# Patient Record
Sex: MALE | Race: WHITE | NOT HISPANIC OR LATINO | Employment: FULL TIME | URBAN - METROPOLITAN AREA
[De-identification: names, ages, dates, MRNs, and addresses within clinical notes are randomized per-mention and may not be internally consistent; named-entity substitution may affect disease eponyms.]

---

## 2023-02-14 PROBLEM — E66.811 CLASS 1 OBESITY WITH BODY MASS INDEX (BMI) OF 30.0 TO 30.9 IN ADULT: Status: ACTIVE | Noted: 2023-02-14

## 2023-02-14 PROBLEM — I10 BENIGN HYPERTENSION: Status: ACTIVE | Noted: 2023-02-14

## 2023-02-14 PROBLEM — E78.2 MIXED HYPERLIPIDEMIA: Status: ACTIVE | Noted: 2023-02-14

## 2023-02-14 PROBLEM — R45.0 NERVOUSLY ANXIOUS: Status: ACTIVE | Noted: 2023-02-14

## 2023-02-14 PROBLEM — E66.9 CLASS 1 OBESITY WITH BODY MASS INDEX (BMI) OF 30.0 TO 30.9 IN ADULT: Status: ACTIVE | Noted: 2023-02-14

## 2023-02-14 PROBLEM — F10.10 ALCOHOL ABUSE, CONTINUOUS: Status: ACTIVE | Noted: 2023-02-14

## 2023-02-14 PROBLEM — E66.9 CLASS 1 OBESITY WITH BODY MASS INDEX (BMI) OF 31.0 TO 31.9 IN ADULT: Status: ACTIVE | Noted: 2023-02-14

## 2023-02-14 PROBLEM — E66.811 CLASS 1 OBESITY WITH BODY MASS INDEX (BMI) OF 31.0 TO 31.9 IN ADULT: Status: ACTIVE | Noted: 2023-02-14

## 2023-02-14 RX ORDER — ATORVASTATIN CALCIUM 10 MG/1
1 TABLET, FILM COATED ORAL DAILY
COMMUNITY
Start: 2021-12-08 | End: 2023-06-15 | Stop reason: SDUPTHER

## 2023-02-14 RX ORDER — AMLODIPINE BESYLATE 5 MG/1
1 TABLET ORAL DAILY
COMMUNITY
Start: 2022-11-16 | End: 2023-04-13

## 2023-02-14 RX ORDER — LISINOPRIL 40 MG/1
1 TABLET ORAL DAILY
COMMUNITY
Start: 2021-11-10 | End: 2023-05-29

## 2023-02-14 RX ORDER — METOPROLOL SUCCINATE 50 MG/1
1 TABLET, EXTENDED RELEASE ORAL DAILY
COMMUNITY
Start: 2022-02-16 | End: 2023-05-31

## 2023-04-13 DIAGNOSIS — I10 BENIGN HYPERTENSION: Primary | ICD-10-CM

## 2023-04-13 RX ORDER — AMLODIPINE BESYLATE 5 MG/1
TABLET ORAL
Qty: 30 TABLET | Refills: 0 | Status: SHIPPED | OUTPATIENT
Start: 2023-04-13 | End: 2023-04-18

## 2023-04-13 RX ORDER — HYDROCHLOROTHIAZIDE 25 MG/1
1 TABLET ORAL DAILY
COMMUNITY
Start: 2022-10-31 | End: 2023-06-15 | Stop reason: SDUPTHER

## 2023-04-15 DIAGNOSIS — I10 BENIGN HYPERTENSION: ICD-10-CM

## 2023-04-18 RX ORDER — AMLODIPINE BESYLATE 5 MG/1
TABLET ORAL
Qty: 30 TABLET | Refills: 0 | Status: SHIPPED | OUTPATIENT
Start: 2023-04-18 | End: 2023-06-15 | Stop reason: SDUPTHER

## 2023-04-20 ENCOUNTER — APPOINTMENT (OUTPATIENT)
Dept: PRIMARY CARE | Facility: CLINIC | Age: 53
End: 2023-04-20

## 2023-05-30 DIAGNOSIS — I10 BENIGN HYPERTENSION: Primary | ICD-10-CM

## 2023-05-31 RX ORDER — METOPROLOL SUCCINATE 50 MG/1
TABLET, EXTENDED RELEASE ORAL
Qty: 30 TABLET | Refills: 0 | Status: SHIPPED | OUTPATIENT
Start: 2023-05-31 | End: 2023-06-15 | Stop reason: SDUPTHER

## 2023-06-15 ENCOUNTER — OFFICE VISIT (OUTPATIENT)
Dept: PRIMARY CARE | Facility: CLINIC | Age: 53
End: 2023-06-15
Payer: COMMERCIAL

## 2023-06-15 VITALS
HEIGHT: 75 IN | RESPIRATION RATE: 18 BRPM | WEIGHT: 241 LBS | HEART RATE: 68 BPM | DIASTOLIC BLOOD PRESSURE: 85 MMHG | SYSTOLIC BLOOD PRESSURE: 130 MMHG | BODY MASS INDEX: 29.97 KG/M2 | OXYGEN SATURATION: 98 %

## 2023-06-15 DIAGNOSIS — I10 BENIGN HYPERTENSION: Primary | ICD-10-CM

## 2023-06-15 DIAGNOSIS — E78.2 MIXED HYPERLIPIDEMIA: ICD-10-CM

## 2023-06-15 DIAGNOSIS — R73.03 PREDIABETES: ICD-10-CM

## 2023-06-15 DIAGNOSIS — E66.09 CLASS 1 OBESITY DUE TO EXCESS CALORIES WITHOUT SERIOUS COMORBIDITY WITH BODY MASS INDEX (BMI) OF 30.0 TO 30.9 IN ADULT: ICD-10-CM

## 2023-06-15 PROCEDURE — 3008F BODY MASS INDEX DOCD: CPT | Performed by: STUDENT IN AN ORGANIZED HEALTH CARE EDUCATION/TRAINING PROGRAM

## 2023-06-15 PROCEDURE — 3079F DIAST BP 80-89 MM HG: CPT | Performed by: STUDENT IN AN ORGANIZED HEALTH CARE EDUCATION/TRAINING PROGRAM

## 2023-06-15 PROCEDURE — 99214 OFFICE O/P EST MOD 30 MIN: CPT | Performed by: STUDENT IN AN ORGANIZED HEALTH CARE EDUCATION/TRAINING PROGRAM

## 2023-06-15 PROCEDURE — 3075F SYST BP GE 130 - 139MM HG: CPT | Performed by: STUDENT IN AN ORGANIZED HEALTH CARE EDUCATION/TRAINING PROGRAM

## 2023-06-15 RX ORDER — METOPROLOL SUCCINATE 50 MG/1
50 TABLET, EXTENDED RELEASE ORAL DAILY
Qty: 30 TABLET | Refills: 5 | Status: SHIPPED | OUTPATIENT
Start: 2023-06-15 | End: 2023-12-26

## 2023-06-15 RX ORDER — HYDROCHLOROTHIAZIDE 25 MG/1
25 TABLET ORAL DAILY
Qty: 30 TABLET | Refills: 5 | Status: SHIPPED | OUTPATIENT
Start: 2023-06-15 | End: 2023-06-15 | Stop reason: SINTOL

## 2023-06-15 RX ORDER — ATORVASTATIN CALCIUM 10 MG/1
10 TABLET, FILM COATED ORAL DAILY
Qty: 90 TABLET | Refills: 1 | Status: SHIPPED | OUTPATIENT
Start: 2023-06-15 | End: 2024-02-14 | Stop reason: SDUPTHER

## 2023-06-15 RX ORDER — AMLODIPINE BESYLATE 5 MG/1
5 TABLET ORAL DAILY
Qty: 30 TABLET | Refills: 5 | Status: SHIPPED | OUTPATIENT
Start: 2023-06-15 | End: 2023-12-23

## 2023-06-15 RX ORDER — LISINOPRIL 40 MG/1
40 TABLET ORAL DAILY
Qty: 30 TABLET | Refills: 5 | Status: SHIPPED | OUTPATIENT
Start: 2023-06-15 | End: 2023-12-26

## 2023-06-15 SDOH — ECONOMIC STABILITY: FOOD INSECURITY: WITHIN THE PAST 12 MONTHS, THE FOOD YOU BOUGHT JUST DIDN'T LAST AND YOU DIDN'T HAVE MONEY TO GET MORE.: NEVER TRUE

## 2023-06-15 SDOH — ECONOMIC STABILITY: FOOD INSECURITY: WITHIN THE PAST 12 MONTHS, YOU WORRIED THAT YOUR FOOD WOULD RUN OUT BEFORE YOU GOT MONEY TO BUY MORE.: NEVER TRUE

## 2023-06-15 ASSESSMENT — ENCOUNTER SYMPTOMS
MUSCULOSKELETAL NEGATIVE: 1
HEADACHES: 0
NAUSEA: 0
DIARRHEA: 0
CONFUSION: 0
CHILLS: 0
FATIGUE: 0
UNEXPECTED WEIGHT CHANGE: 0
ABDOMINAL PAIN: 0
CONSTIPATION: 0
VOMITING: 0
WHEEZING: 0
SHORTNESS OF BREATH: 0
PALPITATIONS: 0
COLOR CHANGE: 0
COUGH: 0
DIZZINESS: 0
FEVER: 0

## 2023-06-15 ASSESSMENT — LIFESTYLE VARIABLES
HOW MANY STANDARD DRINKS CONTAINING ALCOHOL DO YOU HAVE ON A TYPICAL DAY: 10 OR MORE
HOW OFTEN DO YOU HAVE A DRINK CONTAINING ALCOHOL: 4 OR MORE TIMES A WEEK
AUDIT-C TOTAL SCORE: 12
HOW OFTEN DO YOU HAVE SIX OR MORE DRINKS ON ONE OCCASION: DAILY OR ALMOST DAILY
SKIP TO QUESTIONS 9-10: 0

## 2023-06-15 ASSESSMENT — PATIENT HEALTH QUESTIONNAIRE - PHQ9
2. FEELING DOWN, DEPRESSED OR HOPELESS: NOT AT ALL
SUM OF ALL RESPONSES TO PHQ9 QUESTIONS 1 & 2: 0
1. LITTLE INTEREST OR PLEASURE IN DOING THINGS: NOT AT ALL

## 2023-06-15 NOTE — PROGRESS NOTES
"Subjective   Patient ID: Selvin Ochoa is a 53 y.o. male who presents for Follow-up.    HPI   He is here for FU visit. Reports he is doing okay, no acute issues.     # HTN/HLD # Obesity   - io BP was 130/85; not checking BP lately   - taking amlodipine 5mg, lisinopril 40 mg & Metop XL 50 mg daily   - takes atorva 10 mg daily w/o issues   - current BMI, 30.     Review of Systems   Constitutional:  Negative for chills, fatigue, fever and unexpected weight change.   HENT: Negative.     Respiratory:  Negative for cough, shortness of breath and wheezing.    Cardiovascular:  Negative for chest pain, palpitations and leg swelling.   Gastrointestinal:  Negative for abdominal pain, constipation, diarrhea, nausea and vomiting.   Musculoskeletal: Negative.    Skin:  Negative for color change and rash.   Neurological:  Negative for dizziness and headaches.   Psychiatric/Behavioral:  Negative for behavioral problems and confusion.        Objective   /85   Pulse 68   Resp 18   Ht 1.892 m (6' 2.5\")   Wt 109 kg (241 lb)   SpO2 98%   BMI 30.53 kg/m²     Physical Exam  Vitals and nursing note reviewed.   Constitutional:       Appearance: Normal appearance. He is obese.   Cardiovascular:      Rate and Rhythm: Normal rate and regular rhythm.      Pulses: Normal pulses.      Heart sounds: Normal heart sounds.   Pulmonary:      Effort: Pulmonary effort is normal. No respiratory distress.      Breath sounds: Normal breath sounds.   Abdominal:      General: Abdomen is flat. Bowel sounds are normal.      Palpations: Abdomen is soft.   Musculoskeletal:         General: Normal range of motion.   Neurological:      General: No focal deficit present.      Mental Status: He is alert and oriented to person, place, and time.   Psychiatric:         Mood and Affect: Mood normal.         Behavior: Behavior normal.       Assessment/Plan   He is here for FU visit. Overall doing okay, no major concerns today and is clinically & vitally " stable. Plan as follows     # HTN/HLD # Obesity   - BP at the goal, continue current regimen  - meds refilled per emr, may optimize as needed at future visits   - cont statin per emr, tolerating well   - encourage heart healthy & DASH diet; continue exercise as tolerated, at least 150 mins per wk   - BW per emr, will call for any abn results as indicated; pt made aware     # Prediabetes   - cont to follow low carb diet; daily exercises   - repeat A1c as below     Problem List Items Addressed This Visit          Circulatory    Benign hypertension - Primary    Relevant Medications    amLODIPine (Norvasc) 5 mg tablet    lisinopril 40 mg tablet    metoprolol succinate XL (Toprol-XL) 50 mg 24 hr tablet    Other Relevant Orders    Comprehensive Metabolic Panel    CBC       Endocrine/Metabolic    Class 1 obesity with body mass index (BMI) of 30.0 to 30.9 in adult       Other    Mixed hyperlipidemia    Relevant Medications    atorvastatin (Lipitor) 10 mg tablet    Other Relevant Orders    Lipid Panel    CBC     Other Visit Diagnoses       Prediabetes        Relevant Orders    Hemoglobin A1c    CBC          Rtc 6 mo fu    Pankaj Rios MD    [facility]      60

## 2023-12-18 ENCOUNTER — APPOINTMENT (OUTPATIENT)
Dept: PRIMARY CARE | Facility: CLINIC | Age: 53
End: 2023-12-18
Payer: MEDICAID

## 2023-12-21 DIAGNOSIS — I10 BENIGN HYPERTENSION: ICD-10-CM

## 2023-12-23 RX ORDER — AMLODIPINE BESYLATE 5 MG/1
5 TABLET ORAL DAILY
Qty: 30 TABLET | Refills: 1 | Status: SHIPPED | OUTPATIENT
Start: 2023-12-23 | End: 2024-02-14 | Stop reason: SDUPTHER

## 2023-12-26 ENCOUNTER — TELEPHONE (OUTPATIENT)
Dept: PRIMARY CARE | Facility: CLINIC | Age: 53
End: 2023-12-26
Payer: MEDICAID

## 2023-12-26 DIAGNOSIS — I10 BENIGN HYPERTENSION: ICD-10-CM

## 2023-12-26 RX ORDER — METOPROLOL SUCCINATE 50 MG/1
TABLET, EXTENDED RELEASE ORAL
Qty: 30 TABLET | Refills: 1 | Status: SHIPPED | OUTPATIENT
Start: 2023-12-26 | End: 2024-02-14 | Stop reason: SDUPTHER

## 2023-12-26 RX ORDER — LISINOPRIL 40 MG/1
40 TABLET ORAL DAILY
Qty: 30 TABLET | Refills: 1 | Status: SHIPPED | OUTPATIENT
Start: 2023-12-26 | End: 2024-02-14 | Stop reason: SDUPTHER

## 2023-12-26 NOTE — TELEPHONE ENCOUNTER
Pt called in and stated he has an appt with you in February. He had an appt on 12/18, but it was cancelled by us, not him. And February was first available. Do you want me to double book him somewhere? Or bring him in tomorrow?

## 2024-01-09 ENCOUNTER — HOSPITAL ENCOUNTER (INPATIENT)
Facility: HOSPITAL | Age: 54
LOS: 13 days | Discharge: NURSING, REHAB OR LONG-TERM CARE | End: 2024-01-23
Attending: EMERGENCY MEDICINE | Admitting: INTERNAL MEDICINE
Payer: MEDICAID

## 2024-01-09 ENCOUNTER — APPOINTMENT (OUTPATIENT)
Dept: RADIOLOGY | Facility: HOSPITAL | Age: 54
End: 2024-01-09
Payer: MEDICAID

## 2024-01-09 ENCOUNTER — APPOINTMENT (OUTPATIENT)
Dept: CARDIOLOGY | Facility: HOSPITAL | Age: 54
End: 2024-01-09
Payer: MEDICAID

## 2024-01-09 DIAGNOSIS — E87.1 HYPONATREMIA: Primary | ICD-10-CM

## 2024-01-09 DIAGNOSIS — R13.12 OROPHARYNGEAL DYSPHAGIA: ICD-10-CM

## 2024-01-09 LAB
ALBUMIN SERPL BCP-MCNC: 2.8 G/DL (ref 3.4–5)
ALP SERPL-CCNC: 227 U/L (ref 33–120)
ALT SERPL W P-5'-P-CCNC: 193 U/L (ref 10–52)
ANION GAP SERPL CALC-SCNC: 15 MMOL/L (ref 10–20)
APPEARANCE UR: CLEAR
AST SERPL W P-5'-P-CCNC: 399 U/L (ref 9–39)
BASOPHILS # BLD MANUAL: 0 X10*3/UL (ref 0–0.1)
BASOPHILS NFR BLD MANUAL: 0 %
BILIRUB SERPL-MCNC: 14.3 MG/DL (ref 0–1.2)
BILIRUB UR STRIP.AUTO-MCNC: ABNORMAL MG/DL
BUN SERPL-MCNC: 11 MG/DL (ref 6–23)
CALCIUM SERPL-MCNC: 8 MG/DL (ref 8.6–10.3)
CHLORIDE SERPL-SCNC: 80 MMOL/L (ref 98–107)
CO2 SERPL-SCNC: 23 MMOL/L (ref 21–32)
COLOR UR: ABNORMAL
CREAT SERPL-MCNC: 1.31 MG/DL (ref 0.5–1.3)
EGFRCR SERPLBLD CKD-EPI 2021: 65 ML/MIN/1.73M*2
EOSINOPHIL # BLD MANUAL: 0 X10*3/UL (ref 0–0.7)
EOSINOPHIL NFR BLD MANUAL: 0 %
ERYTHROCYTE [DISTWIDTH] IN BLOOD BY AUTOMATED COUNT: 15 % (ref 11.5–14.5)
GLUCOSE SERPL-MCNC: 105 MG/DL (ref 74–99)
GLUCOSE UR STRIP.AUTO-MCNC: NEGATIVE MG/DL
HCT VFR BLD AUTO: 33.3 % (ref 41–52)
HGB BLD-MCNC: 12.5 G/DL (ref 13.5–17.5)
HOLD SPECIMEN: NORMAL
HOLD SPECIMEN: NORMAL
HYALINE CASTS #/AREA URNS AUTO: ABNORMAL /LPF
HYPOCHROMIA BLD QL SMEAR: ABNORMAL
IMM GRANULOCYTES # BLD AUTO: 0.06 X10*3/UL (ref 0–0.7)
IMM GRANULOCYTES NFR BLD AUTO: 0.6 % (ref 0–0.9)
INR PPP: 1.2 (ref 0.9–1.1)
KETONES UR STRIP.AUTO-MCNC: ABNORMAL MG/DL
LACTATE SERPL-SCNC: 1.5 MMOL/L (ref 0.4–2)
LEUKOCYTE ESTERASE UR QL STRIP.AUTO: NEGATIVE
LIPASE SERPL-CCNC: 24 U/L (ref 9–82)
LYMPHOCYTES # BLD MANUAL: 0.32 X10*3/UL (ref 1.2–4.8)
LYMPHOCYTES NFR BLD MANUAL: 3 %
MCH RBC QN AUTO: 33.8 PG (ref 26–34)
MCHC RBC AUTO-ENTMCNC: 37.5 G/DL (ref 32–36)
MCV RBC AUTO: 90 FL (ref 80–100)
MONOCYTES # BLD MANUAL: 0.86 X10*3/UL (ref 0.1–1)
MONOCYTES NFR BLD MANUAL: 8 %
MUCOUS THREADS #/AREA URNS AUTO: ABNORMAL /LPF
NEUTROPHILS # BLD MANUAL: 9.61 X10*3/UL (ref 1.2–7.7)
NEUTS BAND # BLD MANUAL: 0.11 X10*3/UL (ref 0–0.7)
NEUTS BAND NFR BLD MANUAL: 1 %
NEUTS SEG # BLD MANUAL: 9.5 X10*3/UL (ref 1.2–7)
NEUTS SEG NFR BLD MANUAL: 88 %
NITRITE UR QL STRIP.AUTO: NEGATIVE
NRBC BLD-RTO: 0.3 /100 WBCS (ref 0–0)
PH UR STRIP.AUTO: 6 [PH]
PHOSPHATE SERPL-MCNC: 2.3 MG/DL (ref 2.5–4.9)
PLATELET # BLD AUTO: 184 X10*3/UL (ref 150–450)
POTASSIUM SERPL-SCNC: 4.7 MMOL/L (ref 3.5–5.3)
PROT SERPL-MCNC: 5.5 G/DL (ref 6.4–8.2)
PROT UR STRIP.AUTO-MCNC: ABNORMAL MG/DL
PROTHROMBIN TIME: 13.4 SECONDS (ref 9.8–12.8)
RBC # BLD AUTO: 3.7 X10*6/UL (ref 4.5–5.9)
RBC # UR STRIP.AUTO: NEGATIVE /UL
RBC #/AREA URNS AUTO: ABNORMAL /HPF
RBC MORPH BLD: ABNORMAL
SODIUM SERPL-SCNC: 113 MMOL/L (ref 136–145)
SODIUM SERPL-SCNC: 113 MMOL/L (ref 136–145)
SODIUM SERPL-SCNC: 116 MMOL/L (ref 136–145)
SP GR UR STRIP.AUTO: 1.06
TARGETS BLD QL SMEAR: ABNORMAL
TOTAL CELLS COUNTED BLD: 100
UROBILINOGEN UR STRIP.AUTO-MCNC: 4 MG/DL
WBC # BLD AUTO: 10.8 X10*3/UL (ref 4.4–11.3)
WBC #/AREA URNS AUTO: ABNORMAL /HPF

## 2024-01-09 PROCEDURE — C9113 INJ PANTOPRAZOLE SODIUM, VIA: HCPCS | Performed by: NURSE PRACTITIONER

## 2024-01-09 PROCEDURE — 2550000001 HC RX 255 CONTRASTS: Performed by: EMERGENCY MEDICINE

## 2024-01-09 PROCEDURE — 2500000001 HC RX 250 WO HCPCS SELF ADMINISTERED DRUGS (ALT 637 FOR MEDICARE OP): Performed by: NURSE PRACTITIONER

## 2024-01-09 PROCEDURE — 93005 ELECTROCARDIOGRAM TRACING: CPT

## 2024-01-09 PROCEDURE — 2500000004 HC RX 250 GENERAL PHARMACY W/ HCPCS (ALT 636 FOR OP/ED): Performed by: NURSE PRACTITIONER

## 2024-01-09 PROCEDURE — 82570 ASSAY OF URINE CREATININE: CPT | Performed by: INTERNAL MEDICINE

## 2024-01-09 PROCEDURE — 36415 COLL VENOUS BLD VENIPUNCTURE: CPT | Performed by: INTERNAL MEDICINE

## 2024-01-09 PROCEDURE — 85027 COMPLETE CBC AUTOMATED: CPT | Performed by: NURSE PRACTITIONER

## 2024-01-09 PROCEDURE — 80074 ACUTE HEPATITIS PANEL: CPT | Mod: PORLAB | Performed by: INTERNAL MEDICINE

## 2024-01-09 PROCEDURE — 99291 CRITICAL CARE FIRST HOUR: CPT | Performed by: EMERGENCY MEDICINE

## 2024-01-09 PROCEDURE — 74177 CT ABD & PELVIS W/CONTRAST: CPT | Mod: FOREIGN READ | Performed by: RADIOLOGY

## 2024-01-09 PROCEDURE — 83930 ASSAY OF BLOOD OSMOLALITY: CPT | Mod: PORLAB | Performed by: INTERNAL MEDICINE

## 2024-01-09 PROCEDURE — 36415 COLL VENOUS BLD VENIPUNCTURE: CPT | Performed by: NURSE PRACTITIONER

## 2024-01-09 PROCEDURE — 81001 URINALYSIS AUTO W/SCOPE: CPT | Performed by: NURSE PRACTITIONER

## 2024-01-09 PROCEDURE — 83605 ASSAY OF LACTIC ACID: CPT | Performed by: NURSE PRACTITIONER

## 2024-01-09 PROCEDURE — 2500000004 HC RX 250 GENERAL PHARMACY W/ HCPCS (ALT 636 FOR OP/ED): Performed by: EMERGENCY MEDICINE

## 2024-01-09 PROCEDURE — 74177 CT ABD & PELVIS W/CONTRAST: CPT | Mod: FR

## 2024-01-09 PROCEDURE — 85610 PROTHROMBIN TIME: CPT | Performed by: NURSE PRACTITIONER

## 2024-01-09 PROCEDURE — 84295 ASSAY OF SERUM SODIUM: CPT | Performed by: INTERNAL MEDICINE

## 2024-01-09 PROCEDURE — 85007 BL SMEAR W/DIFF WBC COUNT: CPT | Performed by: NURSE PRACTITIONER

## 2024-01-09 PROCEDURE — 99223 1ST HOSP IP/OBS HIGH 75: CPT | Performed by: INTERNAL MEDICINE

## 2024-01-09 PROCEDURE — 84100 ASSAY OF PHOSPHORUS: CPT | Performed by: INTERNAL MEDICINE

## 2024-01-09 PROCEDURE — 80053 COMPREHEN METABOLIC PANEL: CPT | Performed by: NURSE PRACTITIONER

## 2024-01-09 PROCEDURE — 83935 ASSAY OF URINE OSMOLALITY: CPT | Mod: PORLAB | Performed by: INTERNAL MEDICINE

## 2024-01-09 PROCEDURE — 83690 ASSAY OF LIPASE: CPT | Performed by: NURSE PRACTITIONER

## 2024-01-09 PROCEDURE — 2500000004 HC RX 250 GENERAL PHARMACY W/ HCPCS (ALT 636 FOR OP/ED): Performed by: INTERNAL MEDICINE

## 2024-01-09 RX ORDER — FOLIC ACID 1 MG/1
1 TABLET ORAL DAILY
Status: DISCONTINUED | OUTPATIENT
Start: 2024-01-09 | End: 2024-01-09

## 2024-01-09 RX ORDER — PANTOPRAZOLE SODIUM 40 MG/1
40 TABLET, DELAYED RELEASE ORAL
Status: DISCONTINUED | OUTPATIENT
Start: 2024-01-10 | End: 2024-01-12

## 2024-01-09 RX ORDER — LANOLIN ALCOHOL/MO/W.PET/CERES
100 CREAM (GRAM) TOPICAL DAILY
Status: DISCONTINUED | OUTPATIENT
Start: 2024-01-12 | End: 2024-01-10

## 2024-01-09 RX ORDER — SODIUM CHLORIDE 9 MG/ML
125 INJECTION, SOLUTION INTRAVENOUS CONTINUOUS
Status: DISCONTINUED | OUTPATIENT
Start: 2024-01-09 | End: 2024-01-09

## 2024-01-09 RX ORDER — MULTIVIT-MIN/IRON FUM/FOLIC AC 7.5 MG-4
1 TABLET ORAL DAILY
Status: DISCONTINUED | OUTPATIENT
Start: 2024-01-09 | End: 2024-01-09

## 2024-01-09 RX ORDER — PANTOPRAZOLE SODIUM 40 MG/10ML
40 INJECTION, POWDER, LYOPHILIZED, FOR SOLUTION INTRAVENOUS ONCE
Status: DISCONTINUED | OUTPATIENT
Start: 2024-01-09 | End: 2024-01-10 | Stop reason: SDUPTHER

## 2024-01-09 RX ORDER — LANOLIN ALCOHOL/MO/W.PET/CERES
100 CREAM (GRAM) TOPICAL DAILY
Status: DISCONTINUED | OUTPATIENT
Start: 2024-01-13 | End: 2024-01-09 | Stop reason: ALTCHOICE

## 2024-01-09 RX ORDER — DEXTROSE MONOHYDRATE AND SODIUM CHLORIDE 5; .45 G/100ML; G/100ML
75 INJECTION, SOLUTION INTRAVENOUS CONTINUOUS
Status: DISCONTINUED | OUTPATIENT
Start: 2024-01-09 | End: 2024-01-10

## 2024-01-09 RX ORDER — LORAZEPAM 2 MG/ML
2 INJECTION INTRAMUSCULAR EVERY 2 HOUR PRN
Status: DISCONTINUED | OUTPATIENT
Start: 2024-01-09 | End: 2024-01-23 | Stop reason: HOSPADM

## 2024-01-09 RX ORDER — FOLIC ACID 1 MG/1
1 TABLET ORAL DAILY
Status: DISCONTINUED | OUTPATIENT
Start: 2024-01-10 | End: 2024-01-23 | Stop reason: HOSPADM

## 2024-01-09 RX ORDER — FAMOTIDINE 10 MG/ML
40 INJECTION INTRAVENOUS ONCE
Status: COMPLETED | OUTPATIENT
Start: 2024-01-09 | End: 2024-01-09

## 2024-01-09 RX ORDER — PANTOPRAZOLE SODIUM 40 MG/10ML
40 INJECTION, POWDER, LYOPHILIZED, FOR SOLUTION INTRAVENOUS ONCE
Status: COMPLETED | OUTPATIENT
Start: 2024-01-09 | End: 2024-01-09

## 2024-01-09 RX ORDER — LORAZEPAM 2 MG/ML
0.5 INJECTION INTRAMUSCULAR EVERY 2 HOUR PRN
Status: DISCONTINUED | OUTPATIENT
Start: 2024-01-09 | End: 2024-01-23 | Stop reason: HOSPADM

## 2024-01-09 RX ORDER — MULTIVIT-MIN/IRON FUM/FOLIC AC 7.5 MG-4
1 TABLET ORAL DAILY
Status: DISCONTINUED | OUTPATIENT
Start: 2024-01-10 | End: 2024-01-23 | Stop reason: HOSPADM

## 2024-01-09 RX ORDER — LANOLIN ALCOHOL/MO/W.PET/CERES
100 CREAM (GRAM) TOPICAL DAILY
Status: DISCONTINUED | OUTPATIENT
Start: 2024-01-09 | End: 2024-01-09

## 2024-01-09 RX ORDER — PHENOBARBITAL SODIUM 130 MG/ML
130 INJECTION, SOLUTION INTRAMUSCULAR; INTRAVENOUS
Status: DISCONTINUED | OUTPATIENT
Start: 2024-01-09 | End: 2024-01-09

## 2024-01-09 RX ORDER — ONDANSETRON HYDROCHLORIDE 2 MG/ML
4 INJECTION, SOLUTION INTRAVENOUS EVERY 4 HOURS PRN
Status: DISCONTINUED | OUTPATIENT
Start: 2024-01-09 | End: 2024-01-23 | Stop reason: HOSPADM

## 2024-01-09 RX ORDER — ONDANSETRON HYDROCHLORIDE 2 MG/ML
4 INJECTION, SOLUTION INTRAVENOUS ONCE
Status: COMPLETED | OUTPATIENT
Start: 2024-01-09 | End: 2024-01-09

## 2024-01-09 RX ORDER — LORAZEPAM 2 MG/ML
1 INJECTION INTRAMUSCULAR EVERY 2 HOUR PRN
Status: DISCONTINUED | OUTPATIENT
Start: 2024-01-09 | End: 2024-01-23 | Stop reason: HOSPADM

## 2024-01-09 RX ORDER — THIAMINE HYDROCHLORIDE 100 MG/ML
100 INJECTION, SOLUTION INTRAMUSCULAR; INTRAVENOUS DAILY
Status: DISCONTINUED | OUTPATIENT
Start: 2024-01-09 | End: 2024-01-10

## 2024-01-09 RX ADMIN — Medication 1 TABLET: at 13:46

## 2024-01-09 RX ADMIN — FOLIC ACID 1 MG: 1 TABLET ORAL at 13:46

## 2024-01-09 RX ADMIN — ONDANSETRON 4 MG: 2 INJECTION INTRAMUSCULAR; INTRAVENOUS at 13:46

## 2024-01-09 RX ADMIN — PHENOBARBITAL SODIUM 130 MG: 130 INJECTION INTRAMUSCULAR; INTRAVENOUS at 13:46

## 2024-01-09 RX ADMIN — Medication 100 MG: at 13:46

## 2024-01-09 RX ADMIN — SODIUM CHLORIDE 1000 ML: 9 INJECTION, SOLUTION INTRAVENOUS at 13:46

## 2024-01-09 RX ADMIN — SODIUM CHLORIDE 125 ML/HR: 9 INJECTION, SOLUTION INTRAVENOUS at 15:42

## 2024-01-09 RX ADMIN — IOHEXOL 100 ML: 350 INJECTION, SOLUTION INTRAVENOUS at 16:36

## 2024-01-09 RX ADMIN — FAMOTIDINE 40 MG: 10 INJECTION, SOLUTION INTRAVENOUS at 15:42

## 2024-01-09 RX ADMIN — DEXTROSE AND SODIUM CHLORIDE 75 ML/HR: 5; 450 INJECTION, SOLUTION INTRAVENOUS at 18:37

## 2024-01-09 RX ADMIN — LORAZEPAM 1 MG: 2 INJECTION, SOLUTION INTRAMUSCULAR; INTRAVENOUS at 21:24

## 2024-01-09 RX ADMIN — PANTOPRAZOLE SODIUM 40 MG: 40 INJECTION, POWDER, FOR SOLUTION INTRAVENOUS at 13:46

## 2024-01-09 ASSESSMENT — LIFESTYLE VARIABLES
TREMOR: 2
BLOOD PRESSURE: 110/72
NAUSEA AND VOMITING: NO NAUSEA AND NO VOMITING
HEADACHE, FULLNESS IN HEAD: NOT PRESENT
HAVE PEOPLE ANNOYED YOU BY CRITICIZING YOUR DRINKING: NO
ORIENTATION AND CLOUDING OF SENSORIUM: ORIENTED AND CAN DO SERIAL ADDITIONS
VISUAL DISTURBANCES: NOT PRESENT
EVER HAD A DRINK FIRST THING IN THE MORNING TO STEADY YOUR NERVES TO GET RID OF A HANGOVER: YES
ORIENTATION AND CLOUDING OF SENSORIUM: DISORIENTED FOR PLACE OR PERSON
PAROXYSMAL SWEATS: NO SWEAT VISIBLE
ANXIETY: NO ANXIETY, AT EASE
PULSE: 65
AUDITORY DISTURBANCES: NOT PRESENT
HAVE YOU EVER FELT YOU SHOULD CUT DOWN ON YOUR DRINKING: YES
PAROXYSMAL SWEATS: NO SWEAT VISIBLE
BLOOD PRESSURE: 116/72
TOTAL SCORE: 4
TOTAL SCORE: 8
NAUSEA AND VOMITING: NO NAUSEA AND NO VOMITING
NAUSEA AND VOMITING: NO NAUSEA AND NO VOMITING
PULSE: 75
ORIENTATION AND CLOUDING OF SENSORIUM: ORIENTED AND CAN DO SERIAL ADDITIONS
EVER FELT BAD OR GUILTY ABOUT YOUR DRINKING: YES
PAROXYSMAL SWEATS: 2
ANXIETY: NO ANXIETY, AT EASE
AGITATION: NORMAL ACTIVITY
AUDITORY DISTURBANCES: NOT PRESENT
TREMOR: 2
AUDITORY DISTURBANCES: NOT PRESENT
TOTAL SCORE: 14
VISUAL DISTURBANCES: MILD SENSITIVITY
HEADACHE, FULLNESS IN HEAD: NOT PRESENT
AUDITORY DISTURBANCES: VERY MILD HARSHNESS OR ABILITY TO FRIGHTEN
TREMOR: 3
AGITATION: NORMAL ACTIVITY
TACTILE DISTURBANCES: VERY MILD ITCHING, PINS AND NEEDLES, BURNING OR NUMBNESS
HEADACHE, FULLNESS IN HEAD: NOT PRESENT
TREMOR: MODERATE, WITH PATIENT'S ARMS EXTENDED
HEADACHE, FULLNESS IN HEAD: VERY MILD
ANXIETY: NO ANXIETY, AT EASE
AGITATION: NORMAL ACTIVITY
ORIENTATION AND CLOUDING OF SENSORIUM: ORIENTED AND CAN DO SERIAL ADDITIONS
REASON UNABLE TO ASSESS: NO
ANXIETY: NO ANXIETY, AT EASE
NAUSEA AND VOMITING: 5
VISUAL DISTURBANCES: MILD SENSITIVITY
VISUAL DISTURBANCES: NOT PRESENT
AGITATION: NORMAL ACTIVITY
PAROXYSMAL SWEATS: BARELY PERCEPTIBLE SWEATING, PALMS MOIST
TOTAL SCORE: 4

## 2024-01-09 ASSESSMENT — COLUMBIA-SUICIDE SEVERITY RATING SCALE - C-SSRS
6. HAVE YOU EVER DONE ANYTHING, STARTED TO DO ANYTHING, OR PREPARED TO DO ANYTHING TO END YOUR LIFE?: NO
1. IN THE PAST MONTH, HAVE YOU WISHED YOU WERE DEAD OR WISHED YOU COULD GO TO SLEEP AND NOT WAKE UP?: NO
2. HAVE YOU ACTUALLY HAD ANY THOUGHTS OF KILLING YOURSELF?: NO

## 2024-01-09 ASSESSMENT — PAIN - FUNCTIONAL ASSESSMENT: PAIN_FUNCTIONAL_ASSESSMENT: 0-10

## 2024-01-09 NOTE — ED PROVIDER NOTES
HPI   Chief Complaint   Patient presents with   • Delirium Tremens (DTS)       This is a 53-year-old  male, with a past medical history of hypertension, hyperlipidemia, and prediabetes, and alcohol abuse, that is presenting to the emergency room with complaints of weakness for the past 3 days.  The patient reports that he has not been able to keep any solid food down the past 3 days as well.  He has been able to maintain fluids.  The patient drinks 8-10 tall boys per day.  He states that he has cut down.  The patient has had seizure activity when he has tried to quit drinking.  The patient denies any fevers, chills, night cough, or diarrhea.  He reports normal urination.  He noted yesterday that his eyes and chest were turning yellow.  The patient denies any diagnosis of cirrhosis or liver dysfunction.  The patient reports that his last drink was last night but then changed and stated that his last drink was this morning.  He denies any contacts.  He is not having any chest pain, shortness of breath, dizziness, palpitations, paresthesias, focal weakness.  Denies any abdominal pain.                            Noah Coma Scale Score: 15                  Patient History   Past Medical History:   Diagnosis Date   • Personal history of other diseases of the circulatory system     History of hypertension     Past Surgical History:   Procedure Laterality Date   • OTHER SURGICAL HISTORY  11/10/2021    Appendectomy     Family History   Problem Relation Name Age of Onset   • Heart failure Father       Social History     Tobacco Use   • Smoking status: Former     Types: Cigarettes   • Smokeless tobacco: Current     Types: Chew   Vaping Use   • Vaping Use: Never used   Substance Use Topics   • Alcohol use: Yes   • Drug use: Never       Physical Exam   ED Triage Vitals [01/09/24 1302]   Temp Heart Rate Resp BP   36.5 °C (97.7 °F) 85 16 116/70      SpO2 Temp src Heart Rate Source Patient Position   97 % -- -- --       BP Location FiO2 (%)     -- --       Physical Exam  Vitals and nursing note reviewed.   HENT:      Head: Normocephalic and atraumatic.      Right Ear: Tympanic membrane and external ear normal.      Left Ear: Tympanic membrane and external ear normal.      Nose: Nose normal.      Mouth/Throat:      Pharynx: Oropharynx is clear.   Eyes:      Conjunctiva/sclera: Conjunctivae normal.      Pupils: Pupils are equal, round, and reactive to light.   Cardiovascular:      Rate and Rhythm: Normal rate and regular rhythm.      Pulses: Normal pulses.      Heart sounds: Normal heart sounds.   Pulmonary:      Effort: Pulmonary effort is normal.      Breath sounds: Normal breath sounds.   Abdominal:      General: Bowel sounds are normal. There is distension.      Palpations: Abdomen is rigid. There is no fluid wave or pulsatile mass.      Tenderness: There is no abdominal tenderness. There is no right CVA tenderness, left CVA tenderness, guarding or rebound. Negative signs include Bergman's sign, Rovsing's sign and McBurney's sign.      Hernia: No hernia is present.   Musculoskeletal:         General: Normal range of motion.      Cervical back: Normal range of motion and neck supple.   Skin:     Capillary Refill: Capillary refill takes less than 2 seconds.      Coloration: Skin is jaundiced.   Neurological:      General: No focal deficit present.      Mental Status: He is alert.      Cranial Nerves: Cranial nerves 2-12 are intact.      Sensory: Sensation is intact.      Motor: Tremor present.      Coordination: Coordination is intact.   Psychiatric:         Mood and Affect: Mood normal.         ED Course & MDM   Diagnoses as of 01/14/24 0139   Hyponatremia       Medical Decision Making  The patient was seen and evaluated by the nurse practitioner, Mariaelena Chaudhari, in triage.  Preliminary orders were placed based on the patient's presentation.  Further evaluation will be provided by oncoming provider.  Orders are subject to change  based on provider's evaluation.      Procedure  Procedures     CATHLEEN Patrick-CNP  01/14/24 0139

## 2024-01-09 NOTE — H&P
History Of Present Illness  Selvin Ochoa is a 53 y.o. male with a PMH of HTN, HLD and EtOH use disorder presenting with weakness.     He reports progressive weakness for the slat three days. The patient reports that he has not been able to keep any solid food down the past 3 days as well.  He has been able to maintain fluids.  The patient drinks 8-10 tall boys per day.  He states that he has cut down.  The patient has had seizure activity when he has tried to quit drinking.  The patient denies any fevers, chills, night cough, or diarrhea.  He reports normal urination.  He noted yesterday that his eyes and chest were turning yellow.  The patient denies any diagnosis of cirrhosis or liver dysfunction.  The patient reports that his last drink was last night but then changed and stated that his last drink was this morning.  He denies any contacts.  He is not having any chest pain, shortness of breath, dizziness, palpitations, paresthesias, focal weakness.  Denies any abdominal pain.      Past Medical History  He has a past medical history of Personal history of other diseases of the circulatory system.    Surgical History  He has a past surgical history that includes Other surgical history (11/10/2021).     Social History  He reports that he has quit smoking. His smoking use included cigarettes. His smokeless tobacco use includes chew. He reports current alcohol use. He reports that he does not use drugs.    Family History  Family History   Problem Relation Name Age of Onset    Heart failure Father          Allergies  Patient has no known allergies.    Code Status  No Order     A Comprehensive greater than 10 system review of systems was carried out.  Pertinent positives and negatives are noted above.  Otherwise negative for contributory information.       Last Recorded Vitals  /70   Pulse 85   Temp 36.5 °C (97.7 °F)   Resp 16   Wt 109 kg (240 lb)   SpO2 97%      Physical Exam  Constitutional:        Appearance: Normal appearance.   HENT:      Head: Normocephalic and atraumatic.      Mouth/Throat:      Mouth: Mucous membranes are dry.   Eyes:      Extraocular Movements: Extraocular movements intact.      Pupils: Pupils are equal, round, and reactive to light.   Cardiovascular:      Rate and Rhythm: Normal rate and regular rhythm.      Heart sounds: No murmur heard.     No friction rub. No gallop.   Pulmonary:      Effort: Pulmonary effort is normal. No respiratory distress.      Breath sounds: Normal breath sounds. No stridor. No wheezing or rales.   Abdominal:      General: Abdomen is flat. There is no distension.      Palpations: Abdomen is soft.      Tenderness: There is abdominal tenderness. There is no guarding.      Comments: Hypoactive BS    Musculoskeletal:         General: No swelling.   Skin:     General: Skin is warm and dry.   Neurological:      General: No focal deficit present.      Mental Status: He is alert and oriented to person, place, and time.   Psychiatric:         Mood and Affect: Mood normal.         Behavior: Behavior normal.          Relevant Results  Results for orders placed or performed during the hospital encounter of 01/09/24 (from the past 24 hour(s))   CBC and Auto Differential   Result Value Ref Range    WBC 10.8 4.4 - 11.3 x10*3/uL    nRBC 0.3 (H) 0.0 - 0.0 /100 WBCs    RBC 3.70 (L) 4.50 - 5.90 x10*6/uL    Hemoglobin 12.5 (L) 13.5 - 17.5 g/dL    Hematocrit 33.3 (L) 41.0 - 52.0 %    MCV 90 80 - 100 fL    MCH 33.8 26.0 - 34.0 pg    MCHC 37.5 (H) 32.0 - 36.0 g/dL    RDW 15.0 (H) 11.5 - 14.5 %    Platelets 184 150 - 450 x10*3/uL    Immature Granulocytes %, Automated 0.6 0.0 - 0.9 %    Immature Granulocytes Absolute, Automated 0.06 0.00 - 0.70 x10*3/uL   Comprehensive metabolic panel   Result Value Ref Range    Glucose 105 (H) 74 - 99 mg/dL    Sodium 113 (LL) 136 - 145 mmol/L    Potassium 4.7 3.5 - 5.3 mmol/L    Chloride 80 (L) 98 - 107 mmol/L    Bicarbonate 23 21 - 32 mmol/L     Anion Gap 15 10 - 20 mmol/L    Urea Nitrogen 11 6 - 23 mg/dL    Creatinine 1.31 (H) 0.50 - 1.30 mg/dL    eGFR 65 >60 mL/min/1.73m*2    Calcium 8.0 (L) 8.6 - 10.3 mg/dL    Albumin 2.8 (L) 3.4 - 5.0 g/dL    Alkaline Phosphatase 227 (H) 33 - 120 U/L    Total Protein 5.5 (L) 6.4 - 8.2 g/dL     (H) 9 - 39 U/L    Bilirubin, Total 14.3 (H) 0.0 - 1.2 mg/dL     (H) 10 - 52 U/L   Lactate   Result Value Ref Range    Lactate 1.5 0.4 - 2.0 mmol/L   Lipase   Result Value Ref Range    Lipase 24 9 - 82 U/L   Protime-INR   Result Value Ref Range    Protime 13.4 (H) 9.8 - 12.8 seconds    INR 1.2 (H) 0.9 - 1.1   Manual Differential   Result Value Ref Range    Neutrophils %, Manual 88.0 40.0 - 80.0 %    Bands %, Manual 1.0 0.0 - 5.0 %    Lymphocytes %, Manual 3.0 13.0 - 44.0 %    Monocytes %, Manual 8.0 2.0 - 10.0 %    Eosinophils %, Manual 0.0 0.0 - 6.0 %    Basophils %, Manual 0.0 0.0 - 2.0 %    Seg Neutrophils Absolute, Manual 9.50 (H) 1.20 - 7.00 x10*3/uL    Bands Absolute, Manual 0.11 0.00 - 0.70 x10*3/uL    Lymphocytes Absolute, Manual 0.32 (L) 1.20 - 4.80 x10*3/uL    Monocytes Absolute, Manual 0.86 0.10 - 1.00 x10*3/uL    Eosinophils Absolute, Manual 0.00 0.00 - 0.70 x10*3/uL    Basophils Absolute, Manual 0.00 0.00 - 0.10 x10*3/uL    Total Cells Counted 100     Neutrophils Absolute, Manual 9.61 (H) 1.20 - 7.70 x10*3/uL    RBC Morphology See Below     Hypochromia Mild     Target Cells Many    Lavender Top   Result Value Ref Range    Extra Tube Hold for add-ons.    PST Top   Result Value Ref Range    Extra Tube Hold for add-ons.    ECG 12 lead   Result Value Ref Range    Ventricular Rate 65 BPM    Atrial Rate 65 BPM    HI Interval 188 ms    QRS Duration 127 ms    QT Interval 404 ms    QTC Calculation(Bazett) 421 ms    P Axis 64 degrees    R Axis 12 degrees    T Axis -29 degrees    QRS Count 11 beats    Q Onset 252 ms    T Offset 454 ms    QTC Fredericia 414 ms      CT abdomen pelvis w IV contrast    Result Date:  1/9/2024  STUDY: CT Abdomen and Pelvis with IV Contrast; 1/9/2024 at 4:47 PM. INDICATION: Abdominal pain, jaundice. COMPARISON: None available. ACCESSION NUMBER(S): [id number] ORDERING CLINICIAN: NURY HERNANDEZ TECHNIQUE: CT of the abdomen and pelvis was performed.  Contiguous axial images were obtained at 3 mm slice thickness through the abdomen and pelvis. Coronal and sagittal reconstructions at 3 mm slice thickness were performed.  Omnipaque 350 100 mL was administered intravenously.  FINDINGS: LOWER CHEST: No cardiomegaly.  No pericardial effusion.  Lung bases are clear.  ABDOMEN:  LIVER: No hepatomegaly.  Smooth surface contour.  Severe fatty infiltration of the liver.  BILE DUCTS: No intrahepatic or extrahepatic biliary ductal dilatation.  GALLBLADDER: The gallbladder is present without gallstones. STOMACH: Mild gastric wall thickening. PANCREAS: No masses or ductal dilatation.  SPLEEN: No splenomegaly or focal splenic lesion.  ADRENAL GLANDS: No thickening or nodules.  KIDNEYS AND URETERS: Kidneys are normal in size and location.  No renal or ureteral calculi.  Renal cysts measuring up to 3 cm on the left.  PELVIS:  BLADDER: No abnormalities identified.  REPRODUCTIVE ORGANS: No abnormalities identified.  BOWEL: Mildly dilated fluid-filled loops of small bowel centrally measuring up to 3.5 cm without a sharp transition point  VESSELS: No abnormalities identified.  Abdominal aorta is normal in caliber.  PERITONEUM/RETROPERITONEUM/LYMPH NODES: No free fluid.  No pneumoperitoneum. No lymphadenopathy.  ABDOMINAL WALL: No abnormalities identified. SOFT TISSUES: No abnormalities identified.  BONES: No acute fracture or aggressive osseous lesion.    1. Mild gastric wall thickening.  Correlate clinically for gastritis. 2. Mildly dilated fluid-filled loops of small bowel centrally measuring up to 3.5 cm without a sharp transition point. Findings suggestive of a mild ileus.  If symptoms do not improve/resolve,  recommend repeat CT with oral contrast to assess transit of contrast through the dilated small bowel loops. 3. Severe hepatic steatosis.  No other CT findings to account for the patient's reported jaundice.  No biliary dilatation. Signed by Luis Bhardwaj MD    ECG 12 lead    Result Date: 1/9/2024  Sinus rhythm Probable left atrial enlargement Nonspecific intraventricular conduction delay Borderline repolarization abnormality        Assessment/Plan     Hypovolemic Hyponatremia (likely beer potomania as well)   -Na 113 on admit   -Stopped NS and started D5 1/2 normal   -Trend Na Q4, goal correction over the next 24h is 121   -consult intensivist and nephrology     Alcoholic Hepatitis   -DF 20.4, no indication for steroids at this time   -Hepatitis panel pending   -trend CMP and INR   -Imaging with hepatic steatosis   -Consult GI    Gastritis   -start PPI     Possible Ileus   -CLD for now   -IVF as above   -Will consult surgery if he worsens      YOLANDA   -prerenal in the setting of above   -repleting volume     Alcohol Use Disorder with Risk for withdrawal   -patient states he's never experienced complex withdrawal   -CIWA     HTN  -holding home meds with normal BP     DVT ppx   -SCDs         Rich Pierce MD PhD

## 2024-01-10 ENCOUNTER — APPOINTMENT (OUTPATIENT)
Dept: RADIOLOGY | Facility: HOSPITAL | Age: 54
End: 2024-01-10
Payer: MEDICAID

## 2024-01-10 LAB
ALBUMIN SERPL BCP-MCNC: 2.3 G/DL (ref 3.4–5)
ALP SERPL-CCNC: 203 U/L (ref 33–120)
ALT SERPL W P-5'-P-CCNC: 178 U/L (ref 10–52)
ANION GAP BLDV CALCULATED.4IONS-SCNC: 7 MMOL/L (ref 10–25)
ANION GAP SERPL CALC-SCNC: 11 MMOL/L (ref 10–20)
AST SERPL W P-5'-P-CCNC: 328 U/L (ref 9–39)
ATRIAL RATE: 65 BPM
BASE EXCESS BLDV CALC-SCNC: 0 MMOL/L (ref -2–3)
BILIRUB SERPL-MCNC: 15.5 MG/DL (ref 0–1.2)
BODY TEMPERATURE: 37 DEGREES CELSIUS
BUN SERPL-MCNC: 12 MG/DL (ref 6–23)
CA-I BLDV-SCNC: 1.08 MMOL/L (ref 1.1–1.33)
CALCIUM SERPL-MCNC: 8.2 MG/DL (ref 8.6–10.3)
CHLORIDE BLDV-SCNC: 85 MMOL/L (ref 98–107)
CHLORIDE SERPL-SCNC: 84 MMOL/L (ref 98–107)
CO2 SERPL-SCNC: 24 MMOL/L (ref 21–32)
CREAT SERPL-MCNC: 1.31 MG/DL (ref 0.5–1.3)
CREAT UR-MCNC: 176.3 MG/DL (ref 20–370)
EGFRCR SERPLBLD CKD-EPI 2021: 65 ML/MIN/1.73M*2
GLUCOSE BLDV-MCNC: 80 MG/DL (ref 74–99)
GLUCOSE SERPL-MCNC: 95 MG/DL (ref 74–99)
HAV IGM SER QL: NONREACTIVE
HBV CORE IGM SER QL: NONREACTIVE
HBV SURFACE AG SERPL QL IA: NONREACTIVE
HCO3 BLDV-SCNC: 24.8 MMOL/L (ref 22–26)
HCT VFR BLD EST: 33 % (ref 41–52)
HCV AB SER QL: NONREACTIVE
HGB BLDV-MCNC: 10.9 G/DL (ref 13.5–17.5)
HOLD SPECIMEN: NORMAL
INHALED O2 CONCENTRATION: 21 %
INR PPP: 1.2 (ref 0.9–1.1)
LACTATE BLDV-SCNC: 1.3 MMOL/L (ref 0.4–2)
MAGNESIUM SERPL-MCNC: 1.35 MG/DL (ref 1.6–2.4)
OSMOLALITY SERPL: 241 MOSM/KG (ref 280–300)
OSMOLALITY UR: 504 MOSM/KG (ref 200–1200)
OXYHGB MFR BLDV: 87.1 % (ref 45–75)
P AXIS: 64 DEGREES
PCO2 BLDV: 40 MM HG (ref 41–51)
PH BLDV: 7.4 PH (ref 7.33–7.43)
PHOSPHATE SERPL-MCNC: 2.3 MG/DL (ref 2.5–4.9)
PO2 BLDV: 61 MM HG (ref 35–45)
POTASSIUM BLDV-SCNC: 4.6 MMOL/L (ref 3.5–5.3)
POTASSIUM SERPL-SCNC: 4.3 MMOL/L (ref 3.5–5.3)
PR INTERVAL: 188 MS
PROT SERPL-MCNC: 5 G/DL (ref 6.4–8.2)
PROTHROMBIN TIME: 13.5 SECONDS (ref 9.8–12.8)
Q ONSET: 252 MS
QRS COUNT: 11 BEATS
QRS DURATION: 127 MS
QT INTERVAL: 404 MS
QTC CALCULATION(BAZETT): 421 MS
QTC FREDERICIA: 414 MS
R AXIS: 12 DEGREES
SAO2 % BLDV: 90 % (ref 45–75)
SODIUM BLDV-SCNC: 112 MMOL/L (ref 136–145)
SODIUM SERPL-SCNC: 113 MMOL/L (ref 136–145)
SODIUM SERPL-SCNC: 115 MMOL/L (ref 136–145)
SODIUM SERPL-SCNC: 116 MMOL/L (ref 136–145)
SODIUM SERPL-SCNC: 117 MMOL/L (ref 136–145)
SODIUM UR-SCNC: <10 MMOL/L
SODIUM/CREAT UR-RTO: NORMAL
T AXIS: -29 DEGREES
T OFFSET: 454 MS
VENTRICULAR RATE: 65 BPM

## 2024-01-10 PROCEDURE — 84132 ASSAY OF SERUM POTASSIUM: CPT | Performed by: INTERNAL MEDICINE

## 2024-01-10 PROCEDURE — 2020000001 HC ICU ROOM DAILY

## 2024-01-10 PROCEDURE — 2500000004 HC RX 250 GENERAL PHARMACY W/ HCPCS (ALT 636 FOR OP/ED): Performed by: INTERNAL MEDICINE

## 2024-01-10 PROCEDURE — 76705 ECHO EXAM OF ABDOMEN: CPT | Performed by: RADIOLOGY

## 2024-01-10 PROCEDURE — 2500000001 HC RX 250 WO HCPCS SELF ADMINISTERED DRUGS (ALT 637 FOR MEDICARE OP): Performed by: INTERNAL MEDICINE

## 2024-01-10 PROCEDURE — 93975 VASCULAR STUDY: CPT

## 2024-01-10 PROCEDURE — 2500000005 HC RX 250 GENERAL PHARMACY W/O HCPCS: Performed by: INTERNAL MEDICINE

## 2024-01-10 PROCEDURE — 70450 CT HEAD/BRAIN W/O DYE: CPT | Performed by: RADIOLOGY

## 2024-01-10 PROCEDURE — 36415 COLL VENOUS BLD VENIPUNCTURE: CPT | Performed by: INTERNAL MEDICINE

## 2024-01-10 PROCEDURE — 93975 VASCULAR STUDY: CPT | Performed by: RADIOLOGY

## 2024-01-10 PROCEDURE — 84295 ASSAY OF SERUM SODIUM: CPT | Performed by: INTERNAL MEDICINE

## 2024-01-10 PROCEDURE — 99233 SBSQ HOSP IP/OBS HIGH 50: CPT | Performed by: INTERNAL MEDICINE

## 2024-01-10 PROCEDURE — 85610 PROTHROMBIN TIME: CPT | Performed by: INTERNAL MEDICINE

## 2024-01-10 PROCEDURE — 36415 COLL VENOUS BLD VENIPUNCTURE: CPT | Performed by: STUDENT IN AN ORGANIZED HEALTH CARE EDUCATION/TRAINING PROGRAM

## 2024-01-10 PROCEDURE — 99222 1ST HOSP IP/OBS MODERATE 55: CPT | Performed by: INTERNAL MEDICINE

## 2024-01-10 PROCEDURE — 70450 CT HEAD/BRAIN W/O DYE: CPT

## 2024-01-10 PROCEDURE — 83735 ASSAY OF MAGNESIUM: CPT | Performed by: INTERNAL MEDICINE

## 2024-01-10 PROCEDURE — 84100 ASSAY OF PHOSPHORUS: CPT | Performed by: INTERNAL MEDICINE

## 2024-01-10 PROCEDURE — 99291 CRITICAL CARE FIRST HOUR: CPT | Performed by: INTERNAL MEDICINE

## 2024-01-10 PROCEDURE — 84295 ASSAY OF SERUM SODIUM: CPT | Performed by: STUDENT IN AN ORGANIZED HEALTH CARE EDUCATION/TRAINING PROGRAM

## 2024-01-10 RX ORDER — SODIUM CHLORIDE, SODIUM LACTATE, POTASSIUM CHLORIDE, CALCIUM CHLORIDE 600; 310; 30; 20 MG/100ML; MG/100ML; MG/100ML; MG/100ML
75 INJECTION, SOLUTION INTRAVENOUS CONTINUOUS
Status: DISCONTINUED | OUTPATIENT
Start: 2024-01-10 | End: 2024-01-11

## 2024-01-10 RX ORDER — THIAMINE HYDROCHLORIDE 100 MG/ML
100 INJECTION, SOLUTION INTRAMUSCULAR; INTRAVENOUS DAILY
Status: COMPLETED | OUTPATIENT
Start: 2024-01-10 | End: 2024-01-12

## 2024-01-10 RX ORDER — MAGNESIUM SULFATE HEPTAHYDRATE 40 MG/ML
2 INJECTION, SOLUTION INTRAVENOUS ONCE
Status: COMPLETED | OUTPATIENT
Start: 2024-01-10 | End: 2024-01-10

## 2024-01-10 RX ORDER — LANOLIN ALCOHOL/MO/W.PET/CERES
100 CREAM (GRAM) TOPICAL DAILY
Status: DISCONTINUED | OUTPATIENT
Start: 2024-01-10 | End: 2024-01-10

## 2024-01-10 RX ORDER — PHENOBARBITAL 32.4 MG/1
32.4 TABLET ORAL 3 TIMES DAILY
Status: DISCONTINUED | OUTPATIENT
Start: 2024-01-12 | End: 2024-01-10

## 2024-01-10 RX ORDER — METOPROLOL TARTRATE 1 MG/ML
5 INJECTION, SOLUTION INTRAVENOUS EVERY 6 HOURS
Status: DISCONTINUED | OUTPATIENT
Start: 2024-01-10 | End: 2024-01-12 | Stop reason: SDUPTHER

## 2024-01-10 RX ORDER — LORAZEPAM 2 MG/ML
2 INJECTION INTRAMUSCULAR ONCE
Status: COMPLETED | OUTPATIENT
Start: 2024-01-10 | End: 2024-01-10

## 2024-01-10 RX ORDER — PHENOBARBITAL SODIUM 130 MG/ML
130 INJECTION, SOLUTION INTRAMUSCULAR; INTRAVENOUS ONCE
Status: COMPLETED | OUTPATIENT
Start: 2024-01-10 | End: 2024-01-10

## 2024-01-10 RX ORDER — CHLORDIAZEPOXIDE HYDROCHLORIDE 25 MG/1
25 CAPSULE, GELATIN COATED ORAL 3 TIMES DAILY
Status: DISCONTINUED | OUTPATIENT
Start: 2024-01-10 | End: 2024-01-10 | Stop reason: SDUPTHER

## 2024-01-10 RX ORDER — PHENOBARBITAL 32.4 MG/1
64.8 TABLET ORAL 3 TIMES DAILY
Status: DISCONTINUED | OUTPATIENT
Start: 2024-01-10 | End: 2024-01-10

## 2024-01-10 RX ORDER — LANOLIN ALCOHOL/MO/W.PET/CERES
100 CREAM (GRAM) TOPICAL DAILY
Status: DISCONTINUED | OUTPATIENT
Start: 2024-01-13 | End: 2024-01-23 | Stop reason: HOSPADM

## 2024-01-10 RX ORDER — METOPROLOL SUCCINATE 50 MG/1
50 TABLET, EXTENDED RELEASE ORAL DAILY
Status: DISCONTINUED | OUTPATIENT
Start: 2024-01-10 | End: 2024-01-23 | Stop reason: HOSPADM

## 2024-01-10 RX ORDER — PHENOBARBITAL SODIUM 65 MG/ML
65 INJECTION, SOLUTION INTRAMUSCULAR; INTRAVENOUS EVERY 8 HOURS
Status: COMPLETED | OUTPATIENT
Start: 2024-01-10 | End: 2024-01-12

## 2024-01-10 RX ORDER — PHENOBARBITAL SODIUM 65 MG/ML
32.5 INJECTION, SOLUTION INTRAMUSCULAR; INTRAVENOUS EVERY 8 HOURS
Status: DISCONTINUED | OUTPATIENT
Start: 2024-01-12 | End: 2024-01-12

## 2024-01-10 RX ADMIN — FOLIC ACID 1 MG: 1 TABLET ORAL at 08:25

## 2024-01-10 RX ADMIN — PHENOBARBITAL SODIUM 65 MG: 65 INJECTION INTRAMUSCULAR at 23:52

## 2024-01-10 RX ADMIN — Medication 1 TABLET: at 08:25

## 2024-01-10 RX ADMIN — LORAZEPAM 2 MG: 2 INJECTION INTRAMUSCULAR; INTRAVENOUS at 08:28

## 2024-01-10 RX ADMIN — LORAZEPAM 0.5 MG: 2 INJECTION, SOLUTION INTRAMUSCULAR; INTRAVENOUS at 06:37

## 2024-01-10 RX ADMIN — PHENOBARBITAL SODIUM 130 MG: 130 INJECTION INTRAMUSCULAR; INTRAVENOUS at 10:27

## 2024-01-10 RX ADMIN — LORAZEPAM 2 MG: 2 INJECTION INTRAMUSCULAR; INTRAVENOUS at 15:37

## 2024-01-10 RX ADMIN — SODIUM PHOSPHATE, MONOBASIC, MONOHYDRATE AND SODIUM PHOSPHATE, DIBASIC, ANHYDROUS 21 MMOL: 142; 276 INJECTION, SOLUTION INTRAVENOUS at 17:55

## 2024-01-10 RX ADMIN — PANTOPRAZOLE SODIUM 40 MG: 40 TABLET, DELAYED RELEASE ORAL at 08:25

## 2024-01-10 RX ADMIN — METOPROLOL TARTRATE 5 MG: 5 INJECTION INTRAVENOUS at 15:37

## 2024-01-10 RX ADMIN — METOPROLOL TARTRATE 5 MG: 5 INJECTION INTRAVENOUS at 21:24

## 2024-01-10 RX ADMIN — THIAMINE HYDROCHLORIDE 100 MG: 100 INJECTION, SOLUTION INTRAMUSCULAR; INTRAVENOUS at 15:38

## 2024-01-10 RX ADMIN — Medication 100 MG: at 13:37

## 2024-01-10 RX ADMIN — LORAZEPAM 0.5 MG: 2 INJECTION, SOLUTION INTRAMUSCULAR; INTRAVENOUS at 04:18

## 2024-01-10 RX ADMIN — LORAZEPAM 2 MG: 2 INJECTION INTRAMUSCULAR; INTRAVENOUS at 21:31

## 2024-01-10 RX ADMIN — SODIUM CHLORIDE, POTASSIUM CHLORIDE, SODIUM LACTATE AND CALCIUM CHLORIDE 75 ML/HR: 600; 310; 30; 20 INJECTION, SOLUTION INTRAVENOUS at 13:40

## 2024-01-10 RX ADMIN — LORAZEPAM 0.5 MG: 2 INJECTION, SOLUTION INTRAMUSCULAR; INTRAVENOUS at 01:54

## 2024-01-10 RX ADMIN — PHENOBARBITAL SODIUM 65 MG: 65 INJECTION INTRAMUSCULAR at 15:31

## 2024-01-10 RX ADMIN — LORAZEPAM 2 MG: 2 INJECTION INTRAMUSCULAR; INTRAVENOUS at 13:39

## 2024-01-10 RX ADMIN — MAGNESIUM SULFATE HEPTAHYDRATE 2 G: 40 INJECTION, SOLUTION INTRAVENOUS at 15:38

## 2024-01-10 SDOH — SOCIAL STABILITY: SOCIAL INSECURITY: DO YOU FEEL UNSAFE GOING BACK TO THE PLACE WHERE YOU ARE LIVING?: UNABLE TO ASSESS

## 2024-01-10 SDOH — HEALTH STABILITY: MENTAL HEALTH: BEHAVIORS/MOOD: CALM

## 2024-01-10 SDOH — SOCIAL STABILITY: SOCIAL INSECURITY: ABUSE: ADULT

## 2024-01-10 SDOH — SOCIAL STABILITY: SOCIAL INSECURITY: HAS ANYONE EVER THREATENED TO HURT YOUR FAMILY OR YOUR PETS?: UNABLE TO ASSESS

## 2024-01-10 SDOH — SOCIAL STABILITY: SOCIAL INSECURITY: ARE THERE ANY APPARENT SIGNS OF INJURIES/BEHAVIORS THAT COULD BE RELATED TO ABUSE/NEGLECT?: UNABLE TO ASSESS

## 2024-01-10 SDOH — HEALTH STABILITY: MENTAL HEALTH: CONTENT: UNREMARKABLE

## 2024-01-10 SDOH — SOCIAL STABILITY: SOCIAL INSECURITY: WERE YOU ABLE TO COMPLETE ALL THE BEHAVIORAL HEALTH SCREENINGS?: NO

## 2024-01-10 SDOH — SOCIAL STABILITY: SOCIAL INSECURITY: DO YOU FEEL ANYONE HAS EXPLOITED OR TAKEN ADVANTAGE OF YOU FINANCIALLY OR OF YOUR PERSONAL PROPERTY?: UNABLE TO ASSESS

## 2024-01-10 SDOH — SOCIAL STABILITY: SOCIAL INSECURITY: HAVE YOU HAD THOUGHTS OF HARMING ANYONE ELSE?: UNABLE TO ASSESS

## 2024-01-10 SDOH — SOCIAL STABILITY: SOCIAL INSECURITY: ARE YOU OR HAVE YOU BEEN THREATENED OR ABUSED PHYSICALLY, EMOTIONALLY, OR SEXUALLY BY ANYONE?: UNABLE TO ASSESS

## 2024-01-10 SDOH — SOCIAL STABILITY: SOCIAL INSECURITY: DOES ANYONE TRY TO KEEP YOU FROM HAVING/CONTACTING OTHER FRIENDS OR DOING THINGS OUTSIDE YOUR HOME?: UNABLE TO ASSESS

## 2024-01-10 SDOH — HEALTH STABILITY: MENTAL HEALTH

## 2024-01-10 ASSESSMENT — LIFESTYLE VARIABLES
HEADACHE, FULLNESS IN HEAD: NOT PRESENT
AGITATION: 3
TREMOR: NO TREMOR
TOTAL SCORE: 10
NAUSEA AND VOMITING: NO NAUSEA AND NO VOMITING
TREMOR: 3
ORIENTATION AND CLOUDING OF SENSORIUM: ORIENTED AND CAN DO SERIAL ADDITIONS
TREMOR: NO TREMOR
BLOOD PRESSURE: 114/77
VISUAL DISTURBANCES: NOT PRESENT
TOTAL SCORE: 14
TOTAL SCORE: 6
BLOOD PRESSURE: 103/73
PAROXYSMAL SWEATS: NO SWEAT VISIBLE
TOTAL SCORE: 0
AUDITORY DISTURBANCES: NOT PRESENT
AGITATION: SOMEWHAT MORE THAN NORMAL ACTIVITY
TREMOR: MODERATE, WITH PATIENT'S ARMS EXTENDED
VISUAL DISTURBANCES: VERY MILD SENSITIVITY
AUDITORY DISTURBANCES: MILD HARSHNESS OR ABILITY TO FRIGHTEN
AUDITORY DISTURBANCES: NOT PRESENT
TOTAL SCORE: 10
VISUAL DISTURBANCES: NOT PRESENT
AGITATION: MODERATELY FIDGETY AND RESTLESS
NAUSEA AND VOMITING: NO NAUSEA AND NO VOMITING
NAUSEA AND VOMITING: NO NAUSEA AND NO VOMITING
TOTAL SCORE: 0
ORIENTATION AND CLOUDING OF SENSORIUM: ORIENTED AND CAN DO SERIAL ADDITIONS
AUDITORY DISTURBANCES: NOT PRESENT
TREMOR: 3
PAROXYSMAL SWEATS: 2
AGITATION: 3
AGITATION: NORMAL ACTIVITY
HOW MANY STANDARD DRINKS CONTAINING ALCOHOL DO YOU HAVE ON A TYPICAL DAY: PATIENT UNABLE TO ANSWER
VISUAL DISTURBANCES: VERY MILD SENSITIVITY
AUDIT-C TOTAL SCORE: -1
PULSE: 100
BLOOD PRESSURE: 108/60
TOTAL SCORE: 7
ANXIETY: 3
TOTAL SCORE: 13
ANXIETY: NO ANXIETY, AT EASE
VISUAL DISTURBANCES: NOT PRESENT
VISUAL DISTURBANCES: MILD SENSITIVITY
TREMOR: 2
AGITATION: NORMAL ACTIVITY
ORIENTATION AND CLOUDING OF SENSORIUM: CANNOT DO SERIAL ADDITIONS OR IS UNCERTAIN ABOUT DATE
BLOOD PRESSURE: 120/85
VISUAL DISTURBANCES: MILD SENSITIVITY
AUDITORY DISTURBANCES: MILD HARSHNESS OR ABILITY TO FRIGHTEN
TOTAL SCORE: 18
TREMOR: NO TREMOR
NAUSEA AND VOMITING: NO NAUSEA AND NO VOMITING
ANXIETY: NO ANXIETY, AT EASE
PAROXYSMAL SWEATS: NO SWEAT VISIBLE
HEADACHE, FULLNESS IN HEAD: NOT PRESENT
ANXIETY: NO ANXIETY, AT EASE
AGITATION: NORMAL ACTIVITY
ANXIETY: MILDLY ANXIOUS
TOTAL SCORE: 6
AGITATION: 2
TOTAL SCORE: 7
TREMOR: 2
HOW OFTEN DO YOU HAVE A DRINK CONTAINING ALCOHOL: PATIENT UNABLE TO ANSWER
TREMOR: NO TREMOR
NAUSEA AND VOMITING: NO NAUSEA AND NO VOMITING
NAUSEA AND VOMITING: NO NAUSEA AND NO VOMITING
BLOOD PRESSURE: 124/95
PAROXYSMAL SWEATS: BARELY PERCEPTIBLE SWEATING, PALMS MOIST
ANXIETY: NO ANXIETY, AT EASE
TREMOR: 3
ORIENTATION AND CLOUDING OF SENSORIUM: CANNOT DO SERIAL ADDITIONS OR IS UNCERTAIN ABOUT DATE
PAROXYSMAL SWEATS: BARELY PERCEPTIBLE SWEATING, PALMS MOIST
AGITATION: NORMAL ACTIVITY
AUDITORY DISTURBANCES: MILD HARSHNESS OR ABILITY TO FRIGHTEN
PAROXYSMAL SWEATS: NO SWEAT VISIBLE
VISUAL DISTURBANCES: VERY MILD SENSITIVITY
HEADACHE, FULLNESS IN HEAD: MILD
HEADACHE, FULLNESS IN HEAD: VERY MILD
NAUSEA AND VOMITING: NO NAUSEA AND NO VOMITING
TOTAL SCORE: 4
HOW OFTEN DO YOU HAVE 6 OR MORE DRINKS ON ONE OCCASION: PATIENT UNABLE TO ANSWER
PAROXYSMAL SWEATS: NO SWEAT VISIBLE
ORIENTATION AND CLOUDING OF SENSORIUM: ORIENTED AND CAN DO SERIAL ADDITIONS
PAROXYSMAL SWEATS: NO SWEAT VISIBLE
VISUAL DISTURBANCES: VERY MILD SENSITIVITY
ORIENTATION AND CLOUDING OF SENSORIUM: DISORIENTED FOR PLACE OR PERSON
AGITATION: NORMAL ACTIVITY
AGITATION: MODERATELY FIDGETY AND RESTLESS
ORIENTATION AND CLOUDING OF SENSORIUM: ORIENTED AND CAN DO SERIAL ADDITIONS
NAUSEA AND VOMITING: NO NAUSEA AND NO VOMITING
AGITATION: NORMAL ACTIVITY
NAUSEA AND VOMITING: NO NAUSEA AND NO VOMITING
AUDITORY DISTURBANCES: VERY MILD HARSHNESS OR ABILITY TO FRIGHTEN
AUDITORY DISTURBANCES: NOT PRESENT
NAUSEA AND VOMITING: NO NAUSEA AND NO VOMITING
ORIENTATION AND CLOUDING OF SENSORIUM: CANNOT DO SERIAL ADDITIONS OR IS UNCERTAIN ABOUT DATE
TREMOR: 3
HEADACHE, FULLNESS IN HEAD: NOT PRESENT
HEADACHE, FULLNESS IN HEAD: NOT PRESENT
TACTILE DISTURBANCES: VERY MILD ITCHING, PINS AND NEEDLES, BURNING OR NUMBNESS
PAROXYSMAL SWEATS: NO SWEAT VISIBLE
AUDITORY DISTURBANCES: VERY MILD HARSHNESS OR ABILITY TO FRIGHTEN
ORIENTATION AND CLOUDING OF SENSORIUM: DISORIENTED FOR DATA BY NO MORE THAN 2 CALENDAR DAYS
PULSE: 94
PULSE: 100
AGITATION: NORMAL ACTIVITY
SKIP TO QUESTIONS 9-10: 0
ANXIETY: NO ANXIETY, AT EASE
ANXIETY: NO ANXIETY, AT EASE
TREMOR: 2
TACTILE DISTURBANCES: MODERATE ITCHING, PINS AND NEEDLES, BURNING OR NUMBNESS
PAROXYSMAL SWEATS: NO SWEAT VISIBLE
VISUAL DISTURBANCES: NOT PRESENT
HEADACHE, FULLNESS IN HEAD: NOT PRESENT
AUDITORY DISTURBANCES: NOT PRESENT
PAROXYSMAL SWEATS: BARELY PERCEPTIBLE SWEATING, PALMS MOIST
AUDITORY DISTURBANCES: NOT PRESENT
NAUSEA AND VOMITING: NO NAUSEA AND NO VOMITING
PULSE: 82
AUDIT-C TOTAL SCORE: -1
HEADACHE, FULLNESS IN HEAD: NOT PRESENT
ANXIETY: MILDLY ANXIOUS
NAUSEA AND VOMITING: NO NAUSEA AND NO VOMITING
TOTAL SCORE: 0
ORIENTATION AND CLOUDING OF SENSORIUM: CANNOT DO SERIAL ADDITIONS OR IS UNCERTAIN ABOUT DATE
VISUAL DISTURBANCES: MILD SENSITIVITY
HEADACHE, FULLNESS IN HEAD: NOT PRESENT
PULSE: 80
ANXIETY: NO ANXIETY, AT EASE
TREMOR: 3
HEADACHE, FULLNESS IN HEAD: NOT PRESENT
TACTILE DISTURBANCES: MILD ITCHING, PINS AND NEEDLES, BURNING OR NUMBNESS
ANXIETY: MILDLY ANXIOUS
HEADACHE, FULLNESS IN HEAD: NOT PRESENT
VISUAL DISTURBANCES: NOT PRESENT
VISUAL DISTURBANCES: MILD SENSITIVITY
PAROXYSMAL SWEATS: NO SWEAT VISIBLE
PAROXYSMAL SWEATS: NO SWEAT VISIBLE
HEADACHE, FULLNESS IN HEAD: MILD
ANXIETY: 3
TACTILE DISTURBANCES: VERY MILD ITCHING, PINS AND NEEDLES, BURNING OR NUMBNESS
ANXIETY: 3
VISUAL DISTURBANCES: NOT PRESENT
TREMOR: 3
ORIENTATION AND CLOUDING OF SENSORIUM: CANNOT DO SERIAL ADDITIONS OR IS UNCERTAIN ABOUT DATE
ORIENTATION AND CLOUDING OF SENSORIUM: ORIENTED AND CAN DO SERIAL ADDITIONS
PAROXYSMAL SWEATS: NO SWEAT VISIBLE
AUDITORY DISTURBANCES: NOT PRESENT
NAUSEA AND VOMITING: NO NAUSEA AND NO VOMITING
ORIENTATION AND CLOUDING OF SENSORIUM: DISORIENTED FOR PLACE OR PERSON
PAROXYSMAL SWEATS: 2
VISUAL DISTURBANCES: MILD SENSITIVITY
ORIENTATION AND CLOUDING OF SENSORIUM: ORIENTED AND CAN DO SERIAL ADDITIONS
TOTAL SCORE: 20
TREMOR: NO TREMOR
ANXIETY: NO ANXIETY, AT EASE
ANXIETY: MILDLY ANXIOUS
HEADACHE, FULLNESS IN HEAD: NOT PRESENT
ORIENTATION AND CLOUDING OF SENSORIUM: CANNOT DO SERIAL ADDITIONS OR IS UNCERTAIN ABOUT DATE
AGITATION: NORMAL ACTIVITY
NAUSEA AND VOMITING: NO NAUSEA AND NO VOMITING
AUDITORY DISTURBANCES: NOT PRESENT
AUDITORY DISTURBANCES: NOT PRESENT
AGITATION: SOMEWHAT MORE THAN NORMAL ACTIVITY
HEADACHE, FULLNESS IN HEAD: NOT PRESENT
TOTAL SCORE: 1
HEADACHE, FULLNESS IN HEAD: NOT PRESENT
AUDITORY DISTURBANCES: NOT PRESENT

## 2024-01-10 ASSESSMENT — COGNITIVE AND FUNCTIONAL STATUS - GENERAL
MOBILITY SCORE: 6
DRESSING REGULAR UPPER BODY CLOTHING: TOTAL
MOVING FROM LYING ON BACK TO SITTING ON SIDE OF FLAT BED WITH BEDRAILS: TOTAL
TURNING FROM BACK TO SIDE WHILE IN FLAT BAD: TOTAL
STANDING UP FROM CHAIR USING ARMS: TOTAL
PERSONAL GROOMING: TOTAL
DRESSING REGULAR LOWER BODY CLOTHING: TOTAL
CLIMB 3 TO 5 STEPS WITH RAILING: TOTAL
MOVING TO AND FROM BED TO CHAIR: TOTAL
DAILY ACTIVITIY SCORE: 6
WALKING IN HOSPITAL ROOM: TOTAL
HELP NEEDED FOR BATHING: TOTAL
EATING MEALS: TOTAL
TOILETING: TOTAL
PATIENT BASELINE BEDBOUND: NO

## 2024-01-10 ASSESSMENT — ABNORMAL INVOLUNTARY MOVEMENT SCALE (AIMS)
TONGUE: NONE, NORMAL
JAW: NONE, NORMAL
CURRENT_PROBLEMS_TEETH_DENTURES: YES
FACIAL_EXPRESSION_MUSCLES: NONE, NORMAL
UPPER_BODY_EXTREMITIES: MINIMAL
LIPS_PARIETAL: NONE, NORMAL
PATIENT_WEARS_DENTURES: YES
AIMS_PATIENT_AWARENESS: NO AWARENESS
NECK_SHOULDER_HIPS: NONE, NORMAL
LOWER_BODY_EXTREMITIES: NONE, NORMAL
AIMS_PATIENT_INCAPACITATION: NONE, NORMAL

## 2024-01-10 ASSESSMENT — ACTIVITIES OF DAILY LIVING (ADL)
GROOMING: UNABLE TO ASSESS
DRESSING YOURSELF: UNABLE TO ASSESS
FEEDING YOURSELF: UNABLE TO ASSESS
JUDGMENT_ADEQUATE_SAFELY_COMPLETE_DAILY_ACTIVITIES: UNABLE TO ASSESS
WALKS IN HOME: UNABLE TO ASSESS
TOILETING: UNABLE TO ASSESS
LACK_OF_TRANSPORTATION: PATIENT UNABLE TO ANSWER
PATIENT'S MEMORY ADEQUATE TO SAFELY COMPLETE DAILY ACTIVITIES?: UNABLE TO ASSESS
HEARING - RIGHT EAR: UNABLE TO ASSESS
HEARING - LEFT EAR: UNABLE TO ASSESS
BATHING: UNABLE TO ASSESS
ADEQUATE_TO_COMPLETE_ADL: UNABLE TO ASSESS

## 2024-01-10 ASSESSMENT — PAIN - FUNCTIONAL ASSESSMENT
PAIN_FUNCTIONAL_ASSESSMENT: 0-10
PAIN_FUNCTIONAL_ASSESSMENT: 0-10
PAIN_FUNCTIONAL_ASSESSMENT: CPOT (CRITICAL CARE PAIN OBSERVATION TOOL)

## 2024-01-10 ASSESSMENT — PAIN SCALES - GENERAL: PAINLEVEL_OUTOF10: 0 - NO PAIN

## 2024-01-10 NOTE — PROGRESS NOTES
01/10/24 1455   Discharge Planning   Living Arrangements Alone   Support Systems Family members   Assistance Needed Independent   Type of Residence Private residence   Home or Post Acute Services Community services   Patient expects to be discharged to: TBD   Does the patient need discharge transport arranged? Yes   RoundTrip coordination needed? Yes   Financial Resource Strain   How hard is it for you to pay for the very basics like food, housing, medical care, and heating? Pt Unable   Housing Stability   In the last 12 months, was there a time when you were not able to pay the mortgage or rent on time? Pt Unable   In the last 12 months, was there a time when you did not have a steady place to sleep or slept in a shelter (including now)? Pt Unable   Transportation Needs   In the past 12 months, has lack of transportation kept you from medical appointments or from getting medications? Pt Unable   In the past 12 months, has lack of transportation kept you from meetings, work, or from getting things needed for daily living? Pt Unable     PCP is Pankaj Rios. Spoke to patients brother due to patient confusion. Patient is from home alone with support from brother. Patient has been living alone since parents passed. Patient is independent with ambulation, self care, driving, shopping, and meals.  Patient in active alcohol withdrawal. Discharge plan uncertain at this time. TCC to follow.     1/12 Discussed discharge planning during interdisciplinary rounds with Dr. Justice. Patient is not medically ready for discharge. Patient is sodium improving. Patient weaning off Phenobarb to Librium and ativan. Patient discharge plan uncertain at this time.

## 2024-01-10 NOTE — CONSULTS
"Inpatient consult to Gastroenterology  Consult performed by: James Baeza DO  Consult ordered by: Rich Pierce MD PhD          [facility] Gastroenterology Consultation Note    ASSESSMENT and PLAN:       Selvin Ochoa is a 53 y.o. male with a significant past medical history of hypertension, hyperlipidemia and EtOH abuse who presented with weakness. GI was consulted for \" alcohol hepatitis\".       Etoh Hepatitis   - MDF 18.7 indicating good prognosis no indication for prednisolone  -Agree with obtaining acute hepatitis panel rule alternative etiologies however alcohol hepatitis seems to be the most likely will obtain additional serology for completeness  -Will also obtain an right upper quadrant ultrasound to rule out anything such as Budd-Chiari with this acute rise in AST and ALT  - Supportive care     2. EtOH abuse  Actively drinking prior to admissionrStrict abstinence is needed.  - agree with monitoring for EtOH withdrawal and medicating based on CIWA scale  - continue with thiamine and folate  - abstinence is essential and social work should be involved to provide resources for quitting including AA    3. Hyponatremia   - per Nephaida Baeza DO   Gastroenterology         HISTORY OF PRESENT ILLNESS:   Consult Reason: Alcohol hepatitis    History Of Present Illness:    Selvin Ochoa is a 53 y.o. male with a significant past medical history of hypertension, hyperlipidemia and EtOH abuse        Patient presented to the emergency room on 1/9/2023 for evaluation of weakness.  He states he has progressive weakness for the last 3 days and is unable to keep any solid food down for the past 3 days as well.  But has been able to maintain fluids.  He states he drinks 8-10 tall boys per day.  This is carried down for him.  He states he has not she sees active in the past he is try to quit drinking.  Last drink was the morning of 1/9/2024.    Patient was seen evaluated emergency room this a.m. he " "states last drink was yesterday..  He denies any abdominal pain.  Denies any blood in the stool.  He denies a history of previous admissions for alcohol-related liver disease.            Endoscopic History   Denies previous EGD or colonoscopy      Review of systems:     Patient denies any heartburn/GERD, N/V, dysphagia, odynophagia, abdominal pain, diarrhea, constipation, hematemesis, hematochezia, melena, or weight loss.    I performed a complete 10 point review of systems and it is negative except as noted in HPI or above.    All other systems have been reviewed and are negative.    .    Objective         PAST HISTORIES:       Past Medical History:  He has a past medical history of Personal history of other diseases of the circulatory system.    Past Surgical History:  He has a past surgical history that includes Other surgical history (11/10/2021).      Social History:  He reports that he has quit smoking. His smoking use included cigarettes. His smokeless tobacco use includes chew. He reports current alcohol use. He reports that he does not use drugs.    Family History:  No known GI disease, specifically denies pancreatitis, Crohn's, colon cancer, gastroesophageal cancer, or ulcerative colitis.    Family History   Problem Relation Name Age of Onset    Heart failure Father          Allergies:  Patient has no known allergies.      OBJECTIVE:       Last Recorded Vitals:  Vitals:    01/10/24 0414 01/10/24 0605 01/10/24 0630 01/10/24 0715   BP: 120/85 115/63 114/77 99/78   BP Location:  Right leg     Patient Position:  Lying     Pulse: 82 103 100 96   Resp:  (!) 27  24   Temp:       SpO2:  96%  96%   Weight:       Height:         BP 99/78   Pulse 96   Temp 36.5 °C (97.7 °F)   Resp 24   Ht 1.854 m (6' 1\")   Wt 109 kg (240 lb)   SpO2 96%   BMI 31.66 kg/m²      Physical Exam:    Physical Exam  Constitutional:       Appearance: Normal appearance.   HENT:      Head: Normocephalic.      Mouth/Throat:      Mouth: " Mucous membranes are moist.   Eyes:      General: Scleral icterus present.      Extraocular Movements: Extraocular movements intact.   Cardiovascular:      Rate and Rhythm: Normal rate and regular rhythm.      Heart sounds: Normal heart sounds.   Pulmonary:      Effort: Pulmonary effort is normal. No respiratory distress.      Breath sounds: Normal breath sounds. No wheezing.   Abdominal:      General: Abdomen is flat. Bowel sounds are normal. There is no distension.      Palpations: Abdomen is soft.      Tenderness: There is no abdominal tenderness. There is no rebound.   Musculoskeletal:         General: Normal range of motion.   Skin:     General: Skin is dry.      Coloration: Skin is jaundiced.   Neurological:      General: No focal deficit present.      Mental Status: He is alert and oriented to person, place, and time.      Cranial Nerves: No cranial nerve deficit.      Motor: Weakness present.      Comments: Patient is visibly shaking in upper extremities   Psychiatric:         Mood and Affect: Mood normal.         Behavior: Behavior normal.             Inpatient Medications:  folic acid, 1 mg, oral, Daily  multivitamin with minerals, 1 tablet, oral, Daily  pantoprazole, 40 mg, oral, Daily before breakfast  pantoprazole, 40 mg, intravenous, Once  [START ON 1/12/2024] thiamine, 100 mg, oral, Daily  thiamine, 100 mg, intravenous, Daily      PRN medications: LORazepam **OR** LORazepam **OR** LORazepam, ondansetron    Outpatient Medications:  Prior to Admission medications    Medication Sig Start Date End Date Taking? Authorizing Provider   amLODIPine (Norvasc) 5 mg tablet TAKE ONE TABLET BY MOUTH ONCE DAILY 12/23/23   Pankaj Rios MD   atorvastatin (Lipitor) 10 mg tablet Take 1 tablet (10 mg) by mouth once daily. 6/15/23   Pankaj Rios MD   lisinopril 40 mg tablet TAKE ONE TABLET BY MOUTH EVERY DAY 12/26/23   Pankaj Rios MD   metoprolol succinate XL (Toprol-XL) 50 mg 24 hr tablet TAKE ONE TABLET BY  "MOUTH ONCE DAILY. DO NOT CRUSH OR CHEW. 12/26/23   Pankaj Rios MD       LABS AND IMAGING:     Last Labs:    Recent labs reviewed in the EMR.    Lab Results   Component Value Date    WBC 10.8 01/09/2024    HGB 12.5 (L) 01/09/2024    MCV 90 01/09/2024     01/09/2024       Lab Results   Component Value Date     (LL) 01/10/2024     (LL) 01/10/2024    K 4.3 01/10/2024    CL 84 (L) 01/10/2024    BUN 12 01/10/2024    CREATININE 1.31 (H) 01/10/2024    CREATININE 1.31 (H) 01/09/2024       Lab Results   Component Value Date    BILITOT 15.5 (H) 01/10/2024    BILITOT 14.3 (H) 01/09/2024    ALKPHOS 203 (H) 01/10/2024    ALKPHOS 227 (H) 01/09/2024     (H) 01/10/2024     (H) 01/09/2024     (H) 01/10/2024     (H) 01/09/2024    LIPASE 24 01/09/2024       No results found for: \"CRP\", \"CALPS\"  No results found for: \"CALPS\"      Imaging Results     CT abdomen pelvis w IV contrast    Result Date: 1/9/2024  STUDY: CT Abdomen and Pelvis with IV Contrast; 1/9/2024 at 4:47 PM. INDICATION: Abdominal pain, jaundice. COMPARISON: None available. ACCESSION NUMBER(S): [id number] ORDERING CLINICIAN: NURY HERNANDEZ TECHNIQUE: CT of the abdomen and pelvis was performed.  Contiguous axial images were obtained at 3 mm slice thickness through the abdomen and pelvis. Coronal and sagittal reconstructions at 3 mm slice thickness were performed.  Omnipaque 350 100 mL was administered intravenously.  FINDINGS: LOWER CHEST: No cardiomegaly.  No pericardial effusion.  Lung bases are clear.  ABDOMEN:  LIVER: No hepatomegaly.  Smooth surface contour.  Severe fatty infiltration of the liver.  BILE DUCTS: No intrahepatic or extrahepatic biliary ductal dilatation.  GALLBLADDER: The gallbladder is present without gallstones. STOMACH: Mild gastric wall thickening. PANCREAS: No masses or ductal dilatation.  SPLEEN: No splenomegaly or focal splenic lesion.  ADRENAL GLANDS: No thickening or nodules.  KIDNEYS AND " URETERS: Kidneys are normal in size and location.  No renal or ureteral calculi.  Renal cysts measuring up to 3 cm on the left.  PELVIS:  BLADDER: No abnormalities identified.  REPRODUCTIVE ORGANS: No abnormalities identified.  BOWEL: Mildly dilated fluid-filled loops of small bowel centrally measuring up to 3.5 cm without a sharp transition point  VESSELS: No abnormalities identified.  Abdominal aorta is normal in caliber.  PERITONEUM/RETROPERITONEUM/LYMPH NODES: No free fluid.  No pneumoperitoneum. No lymphadenopathy.  ABDOMINAL WALL: No abnormalities identified. SOFT TISSUES: No abnormalities identified.  BONES: No acute fracture or aggressive osseous lesion.    1. Mild gastric wall thickening.  Correlate clinically for gastritis. 2. Mildly dilated fluid-filled loops of small bowel centrally measuring up to 3.5 cm without a sharp transition point. Findings suggestive of a mild ileus.  If symptoms do not improve/resolve, recommend repeat CT with oral contrast to assess transit of contrast through the dilated small bowel loops. 3. Severe hepatic steatosis.  No other CT findings to account for the patient's reported jaundice.  No biliary dilatation. Signed by Luis Bhardwaj MD    ECG 12 lead    Result Date: 1/9/2024  Sinus rhythm Probable left atrial enlargement Nonspecific intraventricular conduction delay Borderline repolarization abnormality

## 2024-01-10 NOTE — CONSULTS
Nephrology Consult Note                                                                                                                                         Inpatient consult to Nephrology  Consult performed by: Bernard Banegas  Consult ordered by: Rich Pierce MD PhD                                                                                                               HPI  Patient is a 53 y.o. male with PMHx of AUD, HTN and HLD presented to the ED on 1/9/24 with complaints of progressive weakness. Over the last 3-4 days the patient reports that he has had abdominal discomfort and difficulty with keeping food down. During this time he has become progressively more weak. He drinks approximately 8-10, 12-24 oz beers daily. His last known alcoholic beverage was yesterday morning. He also reports that he eats one meal daily or every other day.  Prior to his arrival he noticed that his skin was yellow, which was unusual to him.  The patient's brother was present and able to help with questioning. He confirmed that his last drink was on 1/9. However, he does believe that the patient has been drinking significantly less than usual given the multiple episodes of emesis and continued abdominal discomfort.  Nephrology consulted in view of hyponatremia.     Risk factors for YOLANDA  Hypotension no  Contrast no  At risk medications no    Past Medical History:   Diagnosis Date    Personal history of other diseases of the circulatory system     History of hypertension      Social History     Socioeconomic History    Marital status:      Spouse name: Not on file    Number of children: Not on file    Years of education: Not on file    Highest education level: Not on file   Occupational History    Not on file   Tobacco Use    Smoking status: Former     Types: Cigarettes    Smokeless tobacco:  "Current     Types: Chew   Vaping Use    Vaping Use: Never used   Substance and Sexual Activity    Alcohol use: Yes    Drug use: Never    Sexual activity: Not on file   Other Topics Concern    Not on file   Social History Narrative    Not on file     Social Determinants of Health     Financial Resource Strain: Not on file   Food Insecurity: No Food Insecurity (6/15/2023)    Hunger Vital Sign     Worried About Running Out of Food in the Last Year: Never true     Ran Out of Food in the Last Year: Never true   Transportation Needs: Not on file   Physical Activity: Not on file   Stress: Not on file   Social Connections: Not on file   Intimate Partner Violence: Not on file   Housing Stability: Not on file      Family History   Problem Relation Name Age of Onset    Heart failure Father        No current facility-administered medications on file prior to encounter.     Current Outpatient Medications on File Prior to Encounter   Medication Sig Dispense Refill    amLODIPine (Norvasc) 5 mg tablet TAKE ONE TABLET BY MOUTH ONCE DAILY 30 tablet 1    atorvastatin (Lipitor) 10 mg tablet Take 1 tablet (10 mg) by mouth once daily. 90 tablet 1    lisinopril 40 mg tablet TAKE ONE TABLET BY MOUTH EVERY DAY 30 tablet 1    metoprolol succinate XL (Toprol-XL) 50 mg 24 hr tablet TAKE ONE TABLET BY MOUTH ONCE DAILY. DO NOT CRUSH OR CHEW. 30 tablet 1      Scheduled medications  folic acid, 1 mg, oral, Daily  multivitamin with minerals, 1 tablet, oral, Daily  pantoprazole, 40 mg, oral, Daily before breakfast  pantoprazole, 40 mg, intravenous, Once  [START ON 1/12/2024] thiamine, 100 mg, oral, Daily  thiamine, 100 mg, intravenous, Daily      Continuous medications     PRN medications  PRN medications: LORazepam **OR** LORazepam **OR** LORazepam, ondansetron     Review of systems as per HPI otherwise 10 point review systems negative    BP (!) 124/95   Pulse 94   Temp 36.5 °C (97.7 °F)   Resp 24   Ht 1.854 m (6' 1\")   Wt 109 kg (240 lb)   " SpO2 96%   BMI 31.66 kg/m²     Input / Output:  24 HR:   Intake/Output Summary (Last 24 hours) at 1/10/2024 0827  Last data filed at 1/10/2024 0538  Gross per 24 hour   Intake --   Output 450 ml   Net -450 ml       Physical Exam   Alert and oriented x 2, NAD  EOMI  OP clear, mucous membranes dry  Neck: supple, No JVD  CV: RRR without m/r/g  Lungs: CTA bilaterally  Abd: soft NT/ND +BS  Ext: No lower extremity edema   : krause in place with dark urine in collection bag  Skin: jaundice more pronounced centrally, scleral icterus     Results from last 7 days   Lab Units 01/10/24  0428 01/09/24  2348 01/09/24  2235 01/09/24  1841 01/09/24  1349   SODIUM mmol/L 115*  115* 113* 113*   < > 113*   POTASSIUM mmol/L 4.3  --   --   --  4.7   CHLORIDE mmol/L 84*  --   --   --  80*   CO2 mmol/L 24  --   --   --  23   BUN mg/dL 12  --   --   --  11   CREATININE mg/dL 1.31*  --   --   --  1.31*   GLUCOSE mg/dL 95  --   --   --  105*   CALCIUM mg/dL 8.2*  --   --   --  8.0*    < > = values in this interval not displayed.        Results from last 7 days   Lab Units 01/10/24  0428   SODIUM mmol/L 115*  115*   POTASSIUM mmol/L 4.3   CHLORIDE mmol/L 84*   CO2 mmol/L 24   BUN mg/dL 12   CREATININE mg/dL 1.31*   CALCIUM mg/dL 8.2*   PROTEIN TOTAL g/dL 5.0*   BILIRUBIN TOTAL mg/dL 15.5*   ALK PHOS U/L 203*   ALT U/L 178*   AST U/L 328*   GLUCOSE mg/dL 95      Results from last 7 days   Lab Units 01/10/24  0428   MAGNESIUM mg/dL 1.35*      Results from last 7 days   Lab Units 01/09/24  1349   WBC AUTO x10*3/uL 10.8   HEMOGLOBIN g/dL 12.5*   HEMATOCRIT % 33.3*   PLATELETS AUTO x10*3/uL 184        CT abdomen pelvis w IV contrast   Final Result   1. Mild gastric wall thickening.  Correlate clinically for gastritis.   2. Mildly dilated fluid-filled loops of small bowel centrally   measuring up to 3.5 cm without a sharp transition point. Findings   suggestive of a mild ileus.  If symptoms do not improve/resolve,   recommend repeat CT with  oral contrast to assess transit of contrast   through the dilated small bowel loops.   3. Severe hepatic steatosis.  No other CT findings to account for the   patient's reported jaundice.  No biliary dilatation.   Signed by Luis Bhardwaj MD           Assessment:   Patient is 53 y.o. male with PMHx of AUD, HTN and HLD presented to the ED on 1/9/24 with complaints of progressive weakness is admitted to hospital for alcoholic hepatitis and hyponatremia. Nephrology consulted in view of hyponatremia.    Hypotonic Hyponatremia  -Na was 113 on admission  -patient received a bolus of NS on 1/9  -started on  mL/hr which ran for approximately 4 hours  -given D5W for the following 7 hours, infusion stopped at 0100 on 1/10/24  -LR set at 75 mL/hr was started at 1150 today  -receiving q4H Na checks  -Na is now 115    Hypomagnesemia   -1.35 on 1/10/24    Hypophosphatemia  -in the setting of alcohol use disorder and decreased food intake  -mildly decreased at 2.3    Recommendations:   -continue LR at 75 mL/hr  -q4H Na checks   -replete magnesium  -1500 mL fluid restriction  -Serum and urine Osm labs ordered    Please message me through EPIC chat with any questions or concerns.     TITI Louise  1/10/2024  8:27 AM         [facility]    [address], [address]   [place] [address]  Office: [phone]

## 2024-01-10 NOTE — PROGRESS NOTES
Selvin Ochoa is a 53 y.o. male admitted for Hyponatremia. Pharmacy reviewed the patient's [id number] medications and allergies for accuracy.    The list below reflects the PTA list prior to pharmacy medication history. A summary a changes to the PTA medication list has been listed below. Please review each medication in order reconciliation for additional clarification and justification.     No Changes!  Medications added:    Medications modified:    Medications to be removed:    Medications of concern:      Prior to Admission Medications   Prescriptions Last Dose Informant Patient Reported? Taking?   amLODIPine (Norvasc) 5 mg tablet   No No   Sig: TAKE ONE TABLET BY MOUTH ONCE DAILY   atorvastatin (Lipitor) 10 mg tablet   No No   Sig: Take 1 tablet (10 mg) by mouth once daily.   lisinopril 40 mg tablet   No No   Sig: TAKE ONE TABLET BY MOUTH EVERY DAY   metoprolol succinate XL (Toprol-XL) 50 mg 24 hr tablet   No No   Sig: TAKE ONE TABLET BY MOUTH ONCE DAILY. DO NOT CRUSH OR CHEW.      Facility-Administered Medications: None       Christiana Lopes CPhT

## 2024-01-10 NOTE — CONSULTS
Critical Care Medicine Consult      Reason For Consult  Severe Hyponatremia, acute encephalopathy, alcohol withdrawal    History Of Present Illness  Selvin Ochoa is a 53 y.o. male with a past medical history of HTN, HLD, and alcohol use disorder was admitted to the ICU because of severe hyponatremia, acute encephalopathy and alcohol withdrawal.  On our examination the patient was not able to communicate and not oriented.   On admission 1/9/2024: [place] [facility] 1[place]9/[place]24 for progressive weakness of three days. History difficult to obtain due to the patient's altered mental status therefore history gathered from chart review and discussion with the patient's brother. The patient has had difficulty keeping food down as well as abdominal discomfort for the past 3-4 days. The patient drinks 8-10 tall boys per day and has had previous seizure activity when he tried to quit in the past. His brother believes he has been trying to cut down. He also admits that his skin and eyes have started to turn yellow. He denies any previous diagnosis of cirrhosis. His last drink was 1/9/2024.    In the ED, labs revealed hyponatremia with a sodium of 113, acute kidney injury with Scr of 1.31. elevated transaminases  and , bilirubin of 14.3, prolonged PT of 13.4 and INR 1.2. CT of his abdomen and pelvis showed mild gastric wall thickening consistent with gastritis, findings of a mild ileus, and severe hepatic steatosis.     Past Medical History:   Diagnosis Date    Personal history of other diseases of the circulatory system     History of hypertension     Past Surgical History:   Procedure Laterality Date    OTHER SURGICAL HISTORY  11/10/2021    Appendectomy     Medications Prior to Admission   Medication Sig Dispense Refill Last Dose    amLODIPine (Norvasc) 5 mg tablet TAKE ONE TABLET BY MOUTH ONCE DAILY 30 tablet 1     atorvastatin (Lipitor) 10 mg tablet Take 1 tablet (10 mg) by mouth once daily. 90  tablet 1     lisinopril 40 mg tablet TAKE ONE TABLET BY MOUTH EVERY DAY 30 tablet 1     metoprolol succinate XL (Toprol-XL) 50 mg 24 hr tablet TAKE ONE TABLET BY MOUTH ONCE DAILY. DO NOT CRUSH OR CHEW. 30 tablet 1      Patient has no known allergies.  Social History     Tobacco Use    Smoking status: Former     Types: Cigarettes    Smokeless tobacco: Current     Types: Chew   Vaping Use    Vaping Use: Never used   Substance Use Topics    Alcohol use: Yes    Drug use: Never     Family History   Problem Relation Name Age of Onset    Heart failure Father         Scheduled Medications:   folic acid, 1 mg, oral, Daily  multivitamin with minerals, 1 tablet, oral, Daily  pantoprazole, 40 mg, oral, Daily before breakfast  pantoprazole, 40 mg, intravenous, Once  PHENobarbitaL, 64.8 mg, oral, TID   Followed by  [START ON 1/12/2024] PHENobarbitaL, 32.4 mg, oral, TID  thiamine, 100 mg, oral, Daily         Continuous Medications:   lactated Ringer's, 75 mL/hr, Last Rate: 75 mL/hr (01/10/24 1340)         PRN Medications:   PRN medications: LORazepam **OR** LORazepam **OR** LORazepam, ondansetron    Review of Systems:  Review of Systems   Reason unable to perform ROS: altered mental status.        Objective   Vitals:  Most Recent:  Vitals:    01/10/24 1300   BP:    Pulse:    Resp:    Temp: 37.7 °C (99.9 °F)   SpO2:        24hr Min/Max:  Temp  Min: 37.7 °C (99.9 °F)  Max: 37.7 °C (99.9 °F)  Pulse  Min: 65  Max: 104  BP  Min: 99/78  Max: 124/95  Resp  Min: 16  Max: 27  SpO2  Min: 95 %  Max: 98 %    LDA:   Urethral Catheter Coude 18 Fr. (Active)   Placement Date/Time: 01/10/24 0538   Hand Hygiene Completed: Yes  Catheter Type: Coude  Tube Size (Fr.): 18 Fr.  Catheter Balloon Size: 10 mL   Number of days: 0         Vent settings:       Hemodynamic parameters for last 24 hours:         Intake/Output Summary (Last 24 hours) at 1/10/2024 1342  Last data filed at 1/10/2024 0538  Gross per 24 hour   Intake --   Output 450 ml   Net -450 ml            Physical exam:    Physical Exam  Constitutional:       Appearance: He is ill-appearing.   HENT:      Head: Normocephalic and atraumatic.      Mouth/Throat:      Mouth: Mucous membranes are dry.   Eyes:      General: Scleral icterus present.   Cardiovascular:      Rate and Rhythm: Normal rate and regular rhythm.      Pulses: Normal pulses.   Pulmonary:      Effort: No respiratory distress.      Breath sounds: Normal breath sounds.   Abdominal:      General: Bowel sounds are decreased. There is distension.   Musculoskeletal:         General: Normal range of motion.      Cervical back: Normal range of motion and neck supple.   Skin:     General: Skin is dry.      Coloration: Skin is jaundiced.   Neurological:      Mental Status: He is disoriented.          Lab/Radiology/Diagnostic Review:  Results for orders placed or performed during the hospital encounter of 01/09/24 (from the past 24 hour(s))   CBC and Auto Differential   Result Value Ref Range    WBC 10.8 4.4 - 11.3 x10*3/uL    nRBC 0.3 (H) 0.0 - 0.0 /100 WBCs    RBC 3.70 (L) 4.50 - 5.90 x10*6/uL    Hemoglobin 12.5 (L) 13.5 - 17.5 g/dL    Hematocrit 33.3 (L) 41.0 - 52.0 %    MCV 90 80 - 100 fL    MCH 33.8 26.0 - 34.0 pg    MCHC 37.5 (H) 32.0 - 36.0 g/dL    RDW 15.0 (H) 11.5 - 14.5 %    Platelets 184 150 - 450 x10*3/uL    Immature Granulocytes %, Automated 0.6 0.0 - 0.9 %    Immature Granulocytes Absolute, Automated 0.06 0.00 - 0.70 x10*3/uL   Comprehensive metabolic panel   Result Value Ref Range    Glucose 105 (H) 74 - 99 mg/dL    Sodium 113 (LL) 136 - 145 mmol/L    Potassium 4.7 3.5 - 5.3 mmol/L    Chloride 80 (L) 98 - 107 mmol/L    Bicarbonate 23 21 - 32 mmol/L    Anion Gap 15 10 - 20 mmol/L    Urea Nitrogen 11 6 - 23 mg/dL    Creatinine 1.31 (H) 0.50 - 1.30 mg/dL    eGFR 65 >60 mL/min/1.73m*2    Calcium 8.0 (L) 8.6 - 10.3 mg/dL    Albumin 2.8 (L) 3.4 - 5.0 g/dL    Alkaline Phosphatase 227 (H) 33 - 120 U/L    Total Protein 5.5 (L) 6.4 - 8.2 g/dL    AST  399 (H) 9 - 39 U/L    Bilirubin, Total 14.3 (H) 0.0 - 1.2 mg/dL     (H) 10 - 52 U/L   Lactate   Result Value Ref Range    Lactate 1.5 0.4 - 2.0 mmol/L   Lipase   Result Value Ref Range    Lipase 24 9 - 82 U/L   Protime-INR   Result Value Ref Range    Protime 13.4 (H) 9.8 - 12.8 seconds    INR 1.2 (H) 0.9 - 1.1   Manual Differential   Result Value Ref Range    Neutrophils %, Manual 88.0 40.0 - 80.0 %    Bands %, Manual 1.0 0.0 - 5.0 %    Lymphocytes %, Manual 3.0 13.0 - 44.0 %    Monocytes %, Manual 8.0 2.0 - 10.0 %    Eosinophils %, Manual 0.0 0.0 - 6.0 %    Basophils %, Manual 0.0 0.0 - 2.0 %    Seg Neutrophils Absolute, Manual 9.50 (H) 1.20 - 7.00 x10*3/uL    Bands Absolute, Manual 0.11 0.00 - 0.70 x10*3/uL    Lymphocytes Absolute, Manual 0.32 (L) 1.20 - 4.80 x10*3/uL    Monocytes Absolute, Manual 0.86 0.10 - 1.00 x10*3/uL    Eosinophils Absolute, Manual 0.00 0.00 - 0.70 x10*3/uL    Basophils Absolute, Manual 0.00 0.00 - 0.10 x10*3/uL    Total Cells Counted 100     Neutrophils Absolute, Manual 9.61 (H) 1.20 - 7.70 x10*3/uL    RBC Morphology See Below     Hypochromia Mild     Target Cells Many    Lavender Top   Result Value Ref Range    Extra Tube Hold for add-ons.    PST Top   Result Value Ref Range    Extra Tube Hold for add-ons.    Phosphorus   Result Value Ref Range    Phosphorus 2.3 (L) 2.5 - 4.9 mg/dL   ECG 12 lead   Result Value Ref Range    Ventricular Rate 65 BPM    Atrial Rate 65 BPM    NV Interval 188 ms    QRS Duration 127 ms    QT Interval 404 ms    QTC Calculation(Bazett) 421 ms    P Axis 64 degrees    R Axis 12 degrees    T Axis -29 degrees    QRS Count 11 beats    Q Onset 252 ms    T Offset 454 ms    QTC Fredericia 414 ms   Urinalysis with Reflex Culture and Microscopic   Result Value Ref Range    Color, Urine Zehra (N) Straw, Yellow    Appearance, Urine Clear Clear    Specific Gravity, Urine 1.060 (N) 1.005 - 1.035    pH, Urine 6.0 5.0, 5.5, 6.0, 6.5, 7.0, 7.5, 8.0    Protein, Urine 30 (1+)  (N) NEGATIVE mg/dL    Glucose, Urine NEGATIVE NEGATIVE mg/dL    Blood, Urine NEGATIVE NEGATIVE    Ketones, Urine 5 (TRACE) (A) NEGATIVE mg/dL    Bilirubin, Urine MODERATE (2+) (A) NEGATIVE    Urobilinogen, Urine 4.0 (N) <2.0 mg/dL    Nitrite, Urine NEGATIVE NEGATIVE    Leukocyte Esterase, Urine NEGATIVE NEGATIVE   Extra Urine Gray Tube   Result Value Ref Range    Extra Tube Hold for add-ons.    Sodium   Result Value Ref Range    Sodium 116 (LL) 136 - 145 mmol/L   Hepatitis panel, acute   Result Value Ref Range    Hepatitis B Surface AG Nonreactive Nonreactive    Hepatitis A  AB- IgM Nonreactive Nonreactive    Hepatitis B Core AB; IgM Nonreactive Nonreactive    Hepatitis C AB Nonreactive Nonreactive   Urinalysis Microscopic   Result Value Ref Range    WBC, Urine 1-5 1-5, NONE /HPF    RBC, Urine 3-5 NONE, 1-2, 3-5 /HPF    Mucus, Urine 3+ Reference range not established. /LPF    Hyaline Casts, Urine 1+ (A) NONE /LPF   Sodium, Urine Random   Result Value Ref Range    Sodium, Urine Random <10 mmol/L    Creatinine, Urine Random 176.3 20.0 - 370.0 mg/dL    Sodium/Creatinine Ratio     Sodium   Result Value Ref Range    Sodium 113 (LL) 136 - 145 mmol/L   Sodium   Result Value Ref Range    Sodium 113 (LL) 136 - 145 mmol/L   Sodium   Result Value Ref Range    Sodium 115 (LL) 136 - 145 mmol/L   Comprehensive Metabolic Panel   Result Value Ref Range    Glucose 95 74 - 99 mg/dL    Sodium 115 (LL) 136 - 145 mmol/L    Potassium 4.3 3.5 - 5.3 mmol/L    Chloride 84 (L) 98 - 107 mmol/L    Bicarbonate 24 21 - 32 mmol/L    Anion Gap 11 10 - 20 mmol/L    Urea Nitrogen 12 6 - 23 mg/dL    Creatinine 1.31 (H) 0.50 - 1.30 mg/dL    eGFR 65 >60 mL/min/1.73m*2    Calcium 8.2 (L) 8.6 - 10.3 mg/dL    Albumin 2.3 (L) 3.4 - 5.0 g/dL    Alkaline Phosphatase 203 (H) 33 - 120 U/L    Total Protein 5.0 (L) 6.4 - 8.2 g/dL     (H) 9 - 39 U/L    Bilirubin, Total 15.5 (H) 0.0 - 1.2 mg/dL     (H) 10 - 52 U/L   Protime-INR   Result Value Ref  Range    Protime 13.5 (H) 9.8 - 12.8 seconds    INR 1.2 (H) 0.9 - 1.1   Magnesium   Result Value Ref Range    Magnesium 1.35 (L) 1.60 - 2.40 mg/dL   Phosphorus   Result Value Ref Range    Phosphorus 2.3 (L) 2.5 - 4.9 mg/dL   Sodium   Result Value Ref Range    Sodium 115 (LL) 136 - 145 mmol/L     US liver with doppler    Result Date: 1/10/2024  Interpreted By:  Dipesh Hallman, STUDY: US LIVER WITH DOPPLER  1/10/2024 11:54 am   INDICATION: 54 y/o   M with  Signs/Symptoms:liver disease; perform with liver doppler.   COMPARISON: Correlation with CT scan from 01/09/2024. Correlation with renal ultrasound from 03/06/2011.   ACCESSION NUMBER(S): [id number]   ORDERING CLINICIAN: ANABELLE MADERA   TECHNIQUE: Ultrasound of the abdomen was performed using grayscale imaging, color Doppler, and spectral Doppler.   The patient was in restraints and was combative. This did limit the exam somewhat.   FINDINGS: LIVER: Cranialcaudal length:  19.7cm, enlarged. Echogenicity:  Diffusely increased. Smooth liver margins. Mass: None.   GALLBLADDER: No shadowing stone, gallbladder wall thickening, adjacent edema, or sonographic Bergman sign.   BILE DUCTS: No intrahepatic biliary ductal dilatation. Common bile duct measured 4 mm in diameter. This is within the limits of normal.   PANCREAS: The pancreas was obscured by bowel gas..   PERITONEAL FLUID: No ascites.   SPLEEN: Measures  11.7cm in craniocaudal dimension, which is within normal limits.  No focal splenic lesion identified.   RIGHT KIDNEY: The right kidney measured  12.0 cm in length. It  was sonographically normal for size and echogenicity. There was no shadowing stone, hydronephrosis, or perinephric collection.   LEFT KIDNEY: The left  kidney measured  11.3 cm in length. It was sonographically normal for size and echogenicity. There was no shadowing stone, hydronephrosis, or perinephric collection. Upper pole parapelvic cyst measures 38 x 21 x 31 mm. This is grossly stable.   IVC  AND ABDOMINAL AORTA: Not well seen.   HEPATIC ARTERIES: The following demonstrate patency and appropriate direction of flow: Main hepatic artery RI  0.82 which is mildly elevated; Right hepatic artery was not well seen; Left hepatic artery was not well seen;   SPLENIC VEIN: Splenic vein was not well seen   HEPATIC AND PORTAL VENOUS BRANCHES: Right, middle and left hepatic veins were not well seen in this exam. Main portal vein measured  12 mm in diameter. Main portal vein flow was at 10.2   cm/sec. Direction of flow was grossly normal. The left portal vein was not well seen. Anterior branch and posterior branch of the right portal vein were reasonably well seen. Direction of flow was grossly normal. No recanalized periumbilical vein or splenorenal shunt identified.       The patient was in restraints and combative. This did result in a suboptimal exam. Because of this, the right and left hepatic arteries, splenic vein, middle, right, and left hepatic veins, and left portal vein were not well seen. Also, the pancreas was not well seen. Finally, the abdominal aorta and IVC were not well seen.   Blood flow in the main portal vein and the right portal veins was grossly normal in direction.   Main hepatic artery resistive index was mildly elevated. Significance of this is uncertain.   Hepatomegaly.  Nonspecific increased echogenicity throughout the liver, most likely fatty infiltration. Liver margins were smooth. No focal hepatic mass in this limited exam.   No splenomegaly. No ascites.   Grossly stable upper pole parapelvic left renal cyst.       MACRO: None   Signed by: Dipesh Hallman 1/10/2024 12:25 PM Dictation workstation:   [id number]    CT abdomen pelvis w IV contrast    Result Date: 1/9/2024  STUDY: CT Abdomen and Pelvis with IV Contrast; 1/9/2024 at 4:47 PM. INDICATION: Abdominal pain, jaundice. COMPARISON: None available. ACCESSION NUMBER(S): [id number] ORDERING CLINICIAN: NURY HERNANDEZ TECHNIQUE: CT of the  abdomen and pelvis was performed.  Contiguous axial images were obtained at 3 mm slice thickness through the abdomen and pelvis. Coronal and sagittal reconstructions at 3 mm slice thickness were performed.  Omnipaque 350 100 mL was administered intravenously.  FINDINGS: LOWER CHEST: No cardiomegaly.  No pericardial effusion.  Lung bases are clear.  ABDOMEN:  LIVER: No hepatomegaly.  Smooth surface contour.  Severe fatty infiltration of the liver.  BILE DUCTS: No intrahepatic or extrahepatic biliary ductal dilatation.  GALLBLADDER: The gallbladder is present without gallstones. STOMACH: Mild gastric wall thickening. PANCREAS: No masses or ductal dilatation.  SPLEEN: No splenomegaly or focal splenic lesion.  ADRENAL GLANDS: No thickening or nodules.  KIDNEYS AND URETERS: Kidneys are normal in size and location.  No renal or ureteral calculi.  Renal cysts measuring up to 3 cm on the left.  PELVIS:  BLADDER: No abnormalities identified.  REPRODUCTIVE ORGANS: No abnormalities identified.  BOWEL: Mildly dilated fluid-filled loops of small bowel centrally measuring up to 3.5 cm without a sharp transition point  VESSELS: No abnormalities identified.  Abdominal aorta is normal in caliber.  PERITONEUM/RETROPERITONEUM/LYMPH NODES: No free fluid.  No pneumoperitoneum. No lymphadenopathy.  ABDOMINAL WALL: No abnormalities identified. SOFT TISSUES: No abnormalities identified.  BONES: No acute fracture or aggressive osseous lesion.    1. Mild gastric wall thickening.  Correlate clinically for gastritis. 2. Mildly dilated fluid-filled loops of small bowel centrally measuring up to 3.5 cm without a sharp transition point. Findings suggestive of a mild ileus.  If symptoms do not improve/resolve, recommend repeat CT with oral contrast to assess transit of contrast through the dilated small bowel loops. 3. Severe hepatic steatosis.  No other CT findings to account for the patient's reported jaundice.  No biliary dilatation. Signed by  Luis Bhardwaj MD    ECG 12 lead    Result Date: 1/9/2024  Sinus rhythm Probable left atrial enlargement Nonspecific intraventricular conduction delay Borderline repolarization abnormality      Assessment/Plan   Principal Problem:    Hyponatremia    Selvin Ochoa is a 53 y.o. male with a past medical history of HTN, HLD, and alcohol use disorder who presented to the [facility] ED on 1/9/2024 for progressive weakness of three days. He was admitted to the ICU for hyponatremia with a sodium of 113 on admission.    1. Hyponatremia likely secondary to beer potomania and poor oral intake  1/10/2024: Patient received 1L of NS in the ED.  Nephrology was consulted.  Most recent Na level was 115 at 8 am.   Continue to trend sodium every 4 hours.  Do not believe the patient has symptomatic hyponatremia as his altered mental status may be secondary to his acute alcohol withdrawal, therefore no need for hypertonic saline.  Serum and urine osmolality labs pending.  Goal correction rate is no more than 6 mEq/L in 24 hours.  Fluid restriction to 1.5L per day.    2. Acute metabolic encephalopathy secondary to alcohol withdrawal  1/10/2024: CT scan of the head revealed no acute pathology.  Suspect patient's altered mental status is due to him being in acute alcohol withdrawal.   He is at high risk for serious withdrawal and therefore has been placed on phenobarbital taper.     3. Acute alcohol withdrawal and alcohol use disorder  1/10/2024: Continue CIWA Protocol and Phenobarbital taper.  Thiamine, folic acid, multivitamin.  Currently in restraints due to agitation.    4. Alcoholic hepatitis  1/10/2024: CT scan indicated hepatic steatosis and GI was consulted.  Right upper quadrant ultrasound was ordered by GI and did not reveal any acute pathology.  No indication for prednisolone as MDF score is 18.7.  Full hepatic panel pending.  Encourage strict abstinence.  Social work consulted.    5.  Alcohol induced gastritis  1/10/2024: CT  scan of abdomen showed evidence of gastritis  Continue IV Protonix  GI was consulted and is following    MEDHAT Almanzar IV    This note in incomplete until reviewed and revised by attending physician.   unknown

## 2024-01-11 LAB
ALBUMIN SERPL BCP-MCNC: 2.3 G/DL (ref 3.4–5)
ALP SERPL-CCNC: 182 U/L (ref 33–120)
ALT SERPL W P-5'-P-CCNC: 149 U/L (ref 10–52)
AMMONIA PLAS-SCNC: 79 UMOL/L (ref 16–53)
ANION GAP SERPL CALC-SCNC: 11 MMOL/L (ref 10–20)
ANION GAP SERPL CALC-SCNC: 13 MMOL/L (ref 10–20)
ANION GAP SERPL CALC-SCNC: 9 MMOL/L (ref 10–20)
AST SERPL W P-5'-P-CCNC: 246 U/L (ref 9–39)
BILIRUB SERPL-MCNC: 16.3 MG/DL (ref 0–1.2)
BUN SERPL-MCNC: 15 MG/DL (ref 6–23)
BUN SERPL-MCNC: 15 MG/DL (ref 6–23)
BUN SERPL-MCNC: 16 MG/DL (ref 6–23)
CALCIUM SERPL-MCNC: 7.2 MG/DL (ref 8.6–10.3)
CALCIUM SERPL-MCNC: 7.5 MG/DL (ref 8.6–10.3)
CALCIUM SERPL-MCNC: 7.6 MG/DL (ref 8.6–10.3)
CERULOPLASMIN SERPL-MCNC: 29.1 MG/DL (ref 20–60)
CHLORIDE SERPL-SCNC: 88 MMOL/L (ref 98–107)
CHLORIDE SERPL-SCNC: 88 MMOL/L (ref 98–107)
CHLORIDE SERPL-SCNC: 92 MMOL/L (ref 98–107)
CO2 SERPL-SCNC: 22 MMOL/L (ref 21–32)
CO2 SERPL-SCNC: 25 MMOL/L (ref 21–32)
CO2 SERPL-SCNC: 25 MMOL/L (ref 21–32)
CREAT SERPL-MCNC: 1.08 MG/DL (ref 0.5–1.3)
CREAT SERPL-MCNC: 1.2 MG/DL (ref 0.5–1.3)
CREAT SERPL-MCNC: 1.26 MG/DL (ref 0.5–1.3)
EGFRCR SERPLBLD CKD-EPI 2021: 69 ML/MIN/1.73M*2
EGFRCR SERPLBLD CKD-EPI 2021: 73 ML/MIN/1.73M*2
EGFRCR SERPLBLD CKD-EPI 2021: 83 ML/MIN/1.73M*2
ERYTHROCYTE [DISTWIDTH] IN BLOOD BY AUTOMATED COUNT: 15.7 % (ref 11.5–14.5)
GLUCOSE SERPL-MCNC: 73 MG/DL (ref 74–99)
GLUCOSE SERPL-MCNC: 80 MG/DL (ref 74–99)
GLUCOSE SERPL-MCNC: 96 MG/DL (ref 74–99)
HCT VFR BLD AUTO: 27.2 % (ref 41–52)
HGB BLD-MCNC: 10 G/DL (ref 13.5–17.5)
IGA SERPL-MCNC: 519 MG/DL (ref 70–400)
IGG SERPL-MCNC: 1090 MG/DL (ref 700–1600)
IGM SERPL-MCNC: 215 MG/DL (ref 40–230)
INR PPP: 1.1 (ref 0.9–1.1)
MAGNESIUM SERPL-MCNC: 2.01 MG/DL (ref 1.6–2.4)
MCH RBC QN AUTO: 33.4 PG (ref 26–34)
MCHC RBC AUTO-ENTMCNC: 36.8 G/DL (ref 32–36)
MCV RBC AUTO: 91 FL (ref 80–100)
NRBC BLD-RTO: 1.4 /100 WBCS (ref 0–0)
PHOSPHATE SERPL-MCNC: 4.8 MG/DL (ref 2.5–4.9)
PLATELET # BLD AUTO: 178 X10*3/UL (ref 150–450)
POTASSIUM SERPL-SCNC: 4.4 MMOL/L (ref 3.5–5.3)
POTASSIUM SERPL-SCNC: 4.5 MMOL/L (ref 3.5–5.3)
POTASSIUM SERPL-SCNC: 4.6 MMOL/L (ref 3.5–5.3)
PROT SERPL-MCNC: 4.5 G/DL (ref 6.4–8.2)
PROTHROMBIN TIME: 12.2 SECONDS (ref 9.8–12.8)
RBC # BLD AUTO: 2.99 X10*6/UL (ref 4.5–5.9)
SODIUM SERPL-SCNC: 118 MMOL/L (ref 136–145)
SODIUM SERPL-SCNC: 118 MMOL/L (ref 136–145)
SODIUM SERPL-SCNC: 119 MMOL/L (ref 136–145)
SODIUM SERPL-SCNC: 122 MMOL/L (ref 136–145)
WBC # BLD AUTO: 7.3 X10*3/UL (ref 4.4–11.3)

## 2024-01-11 PROCEDURE — 36415 COLL VENOUS BLD VENIPUNCTURE: CPT | Performed by: INTERNAL MEDICINE

## 2024-01-11 PROCEDURE — 84100 ASSAY OF PHOSPHORUS: CPT | Performed by: INTERNAL MEDICINE

## 2024-01-11 PROCEDURE — 2500000004 HC RX 250 GENERAL PHARMACY W/ HCPCS (ALT 636 FOR OP/ED): Performed by: INTERNAL MEDICINE

## 2024-01-11 PROCEDURE — 86015 ACTIN ANTIBODY EACH: CPT | Mod: PORLAB | Performed by: INTERNAL MEDICINE

## 2024-01-11 PROCEDURE — 2020000001 HC ICU ROOM DAILY

## 2024-01-11 PROCEDURE — 85027 COMPLETE CBC AUTOMATED: CPT | Performed by: INTERNAL MEDICINE

## 2024-01-11 PROCEDURE — 84295 ASSAY OF SERUM SODIUM: CPT | Performed by: INTERNAL MEDICINE

## 2024-01-11 PROCEDURE — 82140 ASSAY OF AMMONIA: CPT | Performed by: INTERNAL MEDICINE

## 2024-01-11 PROCEDURE — 85610 PROTHROMBIN TIME: CPT | Performed by: INTERNAL MEDICINE

## 2024-01-11 PROCEDURE — 86235 NUCLEAR ANTIGEN ANTIBODY: CPT | Performed by: INTERNAL MEDICINE

## 2024-01-11 PROCEDURE — 83735 ASSAY OF MAGNESIUM: CPT | Performed by: INTERNAL MEDICINE

## 2024-01-11 PROCEDURE — 82390 ASSAY OF CERULOPLASMIN: CPT | Mod: PORLAB | Performed by: INTERNAL MEDICINE

## 2024-01-11 PROCEDURE — 2500000005 HC RX 250 GENERAL PHARMACY W/O HCPCS: Performed by: INTERNAL MEDICINE

## 2024-01-11 PROCEDURE — 80053 COMPREHEN METABOLIC PANEL: CPT | Performed by: STUDENT IN AN ORGANIZED HEALTH CARE EDUCATION/TRAINING PROGRAM

## 2024-01-11 PROCEDURE — 86376 MICROSOMAL ANTIBODY EACH: CPT | Performed by: INTERNAL MEDICINE

## 2024-01-11 PROCEDURE — 82104 ALPHA-1-ANTITRYPSIN PHENO: CPT | Performed by: INTERNAL MEDICINE

## 2024-01-11 PROCEDURE — 86381 MITOCHONDRIAL ANTIBODY EACH: CPT | Mod: PORLAB | Performed by: INTERNAL MEDICINE

## 2024-01-11 PROCEDURE — 99233 SBSQ HOSP IP/OBS HIGH 50: CPT | Performed by: INTERNAL MEDICINE

## 2024-01-11 PROCEDURE — 86038 ANTINUCLEAR ANTIBODIES: CPT | Mod: PORLAB | Performed by: INTERNAL MEDICINE

## 2024-01-11 PROCEDURE — 80048 BASIC METABOLIC PNL TOTAL CA: CPT | Mod: CCI | Performed by: INTERNAL MEDICINE

## 2024-01-11 PROCEDURE — 99232 SBSQ HOSP IP/OBS MODERATE 35: CPT | Performed by: PHYSICIAN ASSISTANT

## 2024-01-11 PROCEDURE — 36415 COLL VENOUS BLD VENIPUNCTURE: CPT | Performed by: STUDENT IN AN ORGANIZED HEALTH CARE EDUCATION/TRAINING PROGRAM

## 2024-01-11 PROCEDURE — 83516 IMMUNOASSAY NONANTIBODY: CPT | Performed by: INTERNAL MEDICINE

## 2024-01-11 PROCEDURE — 99291 CRITICAL CARE FIRST HOUR: CPT | Performed by: INTERNAL MEDICINE

## 2024-01-11 PROCEDURE — 82784 ASSAY IGA/IGD/IGG/IGM EACH: CPT | Mod: PORLAB | Performed by: INTERNAL MEDICINE

## 2024-01-11 RX ORDER — DEXTROSE MONOHYDRATE 100 MG/ML
0.3 INJECTION, SOLUTION INTRAVENOUS ONCE AS NEEDED
Status: DISCONTINUED | OUTPATIENT
Start: 2024-01-11 | End: 2024-01-23 | Stop reason: HOSPADM

## 2024-01-11 RX ORDER — DEXTROSE 50 % IN WATER (D50W) INTRAVENOUS SYRINGE
25
Status: DISCONTINUED | OUTPATIENT
Start: 2024-01-11 | End: 2024-01-23 | Stop reason: HOSPADM

## 2024-01-11 RX ORDER — SODIUM CHLORIDE, SODIUM LACTATE, POTASSIUM CHLORIDE, CALCIUM CHLORIDE 600; 310; 30; 20 MG/100ML; MG/100ML; MG/100ML; MG/100ML
75 INJECTION, SOLUTION INTRAVENOUS CONTINUOUS
Status: DISCONTINUED | OUTPATIENT
Start: 2024-01-11 | End: 2024-01-11

## 2024-01-11 RX ADMIN — PHENOBARBITAL SODIUM 65 MG: 65 INJECTION INTRAMUSCULAR at 22:55

## 2024-01-11 RX ADMIN — LORAZEPAM 2 MG: 2 INJECTION INTRAMUSCULAR; INTRAVENOUS at 01:16

## 2024-01-11 RX ADMIN — THIAMINE HYDROCHLORIDE 100 MG: 100 INJECTION, SOLUTION INTRAMUSCULAR; INTRAVENOUS at 11:24

## 2024-01-11 RX ADMIN — LORAZEPAM 2 MG: 2 INJECTION INTRAMUSCULAR; INTRAVENOUS at 20:26

## 2024-01-11 RX ADMIN — SODIUM CHLORIDE, POTASSIUM CHLORIDE, SODIUM LACTATE AND CALCIUM CHLORIDE 75 ML/HR: 600; 310; 30; 20 INJECTION, SOLUTION INTRAVENOUS at 04:00

## 2024-01-11 RX ADMIN — METOPROLOL TARTRATE 5 MG: 5 INJECTION INTRAVENOUS at 03:08

## 2024-01-11 RX ADMIN — PHENOBARBITAL SODIUM 65 MG: 65 INJECTION INTRAMUSCULAR at 06:30

## 2024-01-11 RX ADMIN — PANTOPRAZOLE SODIUM 40 MG: 40 TABLET, DELAYED RELEASE ORAL at 16:51

## 2024-01-11 RX ADMIN — PHENOBARBITAL SODIUM 65 MG: 65 INJECTION INTRAMUSCULAR at 15:14

## 2024-01-11 RX ADMIN — SODIUM CHLORIDE, POTASSIUM CHLORIDE, SODIUM LACTATE AND CALCIUM CHLORIDE 75 ML/HR: 600; 310; 30; 20 INJECTION, SOLUTION INTRAVENOUS at 15:14

## 2024-01-11 RX ADMIN — LORAZEPAM 2 MG: 2 INJECTION INTRAMUSCULAR; INTRAVENOUS at 11:37

## 2024-01-11 ASSESSMENT — LIFESTYLE VARIABLES
VISUAL DISTURBANCES: NOT PRESENT
ANXIETY: NO ANXIETY, AT EASE
HEADACHE, FULLNESS IN HEAD: NOT PRESENT
TREMOR: 3
TREMOR: 3
ORIENTATION AND CLOUDING OF SENSORIUM: CANNOT DO SERIAL ADDITIONS OR IS UNCERTAIN ABOUT DATE
ORIENTATION AND CLOUDING OF SENSORIUM: DISORIENTED FOR DATA BY NO MORE THAN 2 CALENDAR DAYS
AGITATION: 3
AUDITORY DISTURBANCES: NOT PRESENT
HEADACHE, FULLNESS IN HEAD: NOT PRESENT
PAROXYSMAL SWEATS: BARELY PERCEPTIBLE SWEATING, PALMS MOIST
VISUAL DISTURBANCES: NOT PRESENT
NAUSEA AND VOMITING: NO NAUSEA AND NO VOMITING
AUDITORY DISTURBANCES: NOT PRESENT
AUDITORY DISTURBANCES: NOT PRESENT
TREMOR: 3
TOTAL SCORE: 5
ORIENTATION AND CLOUDING OF SENSORIUM: CANNOT DO SERIAL ADDITIONS OR IS UNCERTAIN ABOUT DATE
AUDITORY DISTURBANCES: NOT PRESENT
TOTAL SCORE: 5
AGITATION: NORMAL ACTIVITY
HEADACHE, FULLNESS IN HEAD: NOT PRESENT
NAUSEA AND VOMITING: NO NAUSEA AND NO VOMITING
TOTAL SCORE: 10
AGITATION: NORMAL ACTIVITY
VISUAL DISTURBANCES: NOT PRESENT
NAUSEA AND VOMITING: 3
HEADACHE, FULLNESS IN HEAD: NOT PRESENT
PAROXYSMAL SWEATS: BARELY PERCEPTIBLE SWEATING, PALMS MOIST
ANXIETY: MILDLY ANXIOUS
ORIENTATION AND CLOUDING OF SENSORIUM: CANNOT DO SERIAL ADDITIONS OR IS UNCERTAIN ABOUT DATE
ANXIETY: NO ANXIETY, AT EASE
TOTAL SCORE: 10
PAROXYSMAL SWEATS: NO SWEAT VISIBLE
PAROXYSMAL SWEATS: BARELY PERCEPTIBLE SWEATING, PALMS MOIST
TREMOR: 3
NAUSEA AND VOMITING: NO NAUSEA AND NO VOMITING
AGITATION: NORMAL ACTIVITY
VISUAL DISTURBANCES: NOT PRESENT
ANXIETY: 3

## 2024-01-11 ASSESSMENT — COGNITIVE AND FUNCTIONAL STATUS - GENERAL
DRESSING REGULAR LOWER BODY CLOTHING: TOTAL
EATING MEALS: TOTAL
CLIMB 3 TO 5 STEPS WITH RAILING: TOTAL
STANDING UP FROM CHAIR USING ARMS: TOTAL
TOILETING: TOTAL
WALKING IN HOSPITAL ROOM: TOTAL
HELP NEEDED FOR BATHING: TOTAL
MOVING FROM LYING ON BACK TO SITTING ON SIDE OF FLAT BED WITH BEDRAILS: TOTAL
DRESSING REGULAR UPPER BODY CLOTHING: TOTAL
TURNING FROM BACK TO SIDE WHILE IN FLAT BAD: TOTAL
PERSONAL GROOMING: TOTAL
DAILY ACTIVITIY SCORE: 6
MOBILITY SCORE: 6
MOVING TO AND FROM BED TO CHAIR: TOTAL

## 2024-01-11 ASSESSMENT — PAIN - FUNCTIONAL ASSESSMENT
PAIN_FUNCTIONAL_ASSESSMENT: 0-10
PAIN_FUNCTIONAL_ASSESSMENT: 0-10

## 2024-01-11 ASSESSMENT — PAIN SCALES - GENERAL
PAINLEVEL_OUTOF10: 0 - NO PAIN
PAINLEVEL_OUTOF10: 0 - NO PAIN

## 2024-01-11 NOTE — PROGRESS NOTES
"Selvin Ochoa is a 53 y.o. male on day 1 of admission presenting with Hyponatremia.    Subjective   Patient seen and examined. He is in soft restraints. His cousin is present at bedside. He does not provide meaningful history due to AMS. No acute issues overnight. No report of GI bleeding.      10 point ROS unable to be determined due to mental status  Objective     Physical Exam  Vitals reviewed.   Constitutional:       General: He is not in acute distress.     Appearance: Normal appearance.      Comments: Restless in bed   Cardiovascular:      Rate and Rhythm: Normal rate and regular rhythm.   Pulmonary:      Effort: Pulmonary effort is normal.      Breath sounds: Normal breath sounds.   Abdominal:      General: Bowel sounds are normal. There is no distension.      Palpations: Abdomen is soft.      Tenderness: There is no abdominal tenderness. There is no guarding or rebound.   Skin:     Coloration: Skin is jaundiced.   Neurological:      Mental Status: He is alert.      Comments: Patient awakens eyes but does not communicate or interact with examiner.    Psychiatric:      Comments: Unable to determine due to mental status         Last Recorded Vitals  Blood pressure 119/67, pulse 89, temperature 37.2 °C (99 °F), temperature source Temporal, resp. rate 20, height 1.854 m (6' 1\"), weight 111 kg (244 lb 11.4 oz), SpO2 100 %.  Intake/Output last 3 Shifts:  I/O last 3 completed shifts:  In: 1888.8 (17 mL/kg) [P.O.:200; I.V.:1688.8 (15.2 mL/kg)]  Out: 1410 (12.7 mL/kg) [Urine:1410 (0.4 mL/kg/hr)]  Weight: 111 kg     Relevant Results      Scheduled medications  folic acid, 1 mg, oral, Daily  [Held by provider] metoprolol succinate XL, 50 mg, oral, Daily  metoprolol, 5 mg, intravenous, q6h  multivitamin with minerals, 1 tablet, oral, Daily  pantoprazole, 40 mg, oral, Daily before breakfast  PHENobarbital, 65 mg, intravenous, q8h   Followed by  [START ON 1/12/2024] PHENobarbital, 32.5 mg, intravenous, q8h  [START ON " 1/13/2024] thiamine, 100 mg, oral, Daily  thiamine, 100 mg, intravenous, Daily      Continuous medications     PRN medications  PRN medications: LORazepam **OR** LORazepam **OR** LORazepam, ondansetron, oxygen    Results for orders placed or performed during the hospital encounter of 01/09/24 (from the past 24 hour(s))   Sodium   Result Value Ref Range    Sodium 115 (LL) 136 - 145 mmol/L   Blood Gas Venous Full Panel   Result Value Ref Range    POCT pH, Venous 7.40 7.33 - 7.43 pH    POCT pCO2, Venous 40 (L) 41 - 51 mm Hg    POCT pO2, Venous 61 (H) 35 - 45 mm Hg    POCT SO2, Venous 90 (H) 45 - 75 %    POCT Oxy Hemoglobin, Venous 87.1 (H) 45.0 - 75.0 %    POCT Hematocrit Calculated, Venous 33.0 (L) 41.0 - 52.0 %    POCT Sodium, Venous 112 (LL) 136 - 145 mmol/L    POCT Potassium, Venous 4.6 3.5 - 5.3 mmol/L    POCT Chloride, Venous 85 (L) 98 - 107 mmol/L    POCT Ionized Calicum, Venous 1.08 (L) 1.10 - 1.33 mmol/L    POCT Glucose, Venous 80 74 - 99 mg/dL    POCT Lactate, Venous 1.3 0.4 - 2.0 mmol/L    POCT Base Excess, Venous 0.0 -2.0 - 3.0 mmol/L    POCT HCO3 Calculated, Venous 24.8 22.0 - 26.0 mmol/L    POCT Hemoglobin, Venous 10.9 (L) 13.5 - 17.5 g/dL    POCT Anion Gap, Venous 7.0 (L) 10.0 - 25.0 mmol/L    Patient Temperature 37.0 degrees Celsius    FiO2 21 %   Sodium   Result Value Ref Range    Sodium 117 (LL) 136 - 145 mmol/L   Sodium   Result Value Ref Range    Sodium 116 (LL) 136 - 145 mmol/L   Sodium   Result Value Ref Range    Sodium 118 (LL) 136 - 145 mmol/L   Comprehensive Metabolic Panel   Result Value Ref Range    Glucose 73 (L) 74 - 99 mg/dL    Sodium 119 (LL) 136 - 145 mmol/L    Potassium 4.5 3.5 - 5.3 mmol/L    Chloride 88 (L) 98 - 107 mmol/L    Bicarbonate 25 21 - 32 mmol/L    Anion Gap 11 10 - 20 mmol/L    Urea Nitrogen 16 6 - 23 mg/dL    Creatinine 1.26 0.50 - 1.30 mg/dL    eGFR 69 >60 mL/min/1.73m*2    Calcium 7.6 (L) 8.6 - 10.3 mg/dL    Albumin 2.3 (L) 3.4 - 5.0 g/dL    Alkaline Phosphatase 182 (H)  "33 - 120 U/L    Total Protein 4.5 (L) 6.4 - 8.2 g/dL     (H) 9 - 39 U/L    Bilirubin, Total 16.3 (H) 0.0 - 1.2 mg/dL     (H) 10 - 52 U/L   Protime-INR   Result Value Ref Range    Protime 12.2 9.8 - 12.8 seconds    INR 1.1 0.9 - 1.1   Magnesium   Result Value Ref Range    Magnesium 2.01 1.60 - 2.40 mg/dL   Phosphorus   Result Value Ref Range    Phosphorus 4.8 2.5 - 4.9 mg/dL   CBC   Result Value Ref Range    WBC 7.3 4.4 - 11.3 x10*3/uL    nRBC 1.4 (H) 0.0 - 0.0 /100 WBCs    RBC 2.99 (L) 4.50 - 5.90 x10*6/uL    Hemoglobin 10.0 (L) 13.5 - 17.5 g/dL    Hematocrit 27.2 (L) 41.0 - 52.0 %    MCV 91 80 - 100 fL    MCH 33.4 26.0 - 34.0 pg    MCHC 36.8 (H) 32.0 - 36.0 g/dL    RDW 15.7 (H) 11.5 - 14.5 %    Platelets 178 150 - 450 x10*3/uL         * Cannot find OR log *  Last relevant procedure:                          Assessment/Plan   Principal Problem:    Hyponatremia    Selvin Ochoa is a 53 y.o. male with a significant past medical history of hypertension, hyperlipidemia and EtOH abuse who presented with weakness. GI was consulted for \" alcohol hepatitis\".        Etoh Hepatitis   MDF today at 21.8 indicating good prognosis with no current indication for prednisolone. Acute hepatitis panel negative. Additional liver labs pending at this time. RUQ US showed hepatomegaly and a fatty liver. Bilirubun up slightly today, transaminases and alk phos slightly better.   - Supportive care   - Daily LFTs and INR     2. EtOH abuse  Actively drinking prior to admissionrStrict abstinence is needed.  - agree with monitoring for EtOH withdrawal and medicating based on CIWA scale  - continue with thiamine and folate  - abstinence is essential and social work should be involved to provide resources for quitting including AA     3. Hyponatremia   - per Nephro        Case was reviewed with Dr. Baeza.      Luana Roca PA-C      "

## 2024-01-11 NOTE — PROGRESS NOTES
Nutrition Initial Assessment:   Nutrition Assessment    Reason for Assessment: Dietitian discretion (MST screen)    Medical history per chart:   HTN, HLD and EtOH abuse    HPI:  Patient presents with AMS, weakness, N/V    1/11:  Patient seen in ICU, going through ETOH withdrawal and AMS, history obtained from chart review and IDT rounds.  Patient with poor oral intake PTA, N/V noted.  Cardiac diet with fluid restriction ordered, patient had not yet eaten this morning, will monitor.        Current Diet: Adult diet Cardiac; 70 gm fat; 2 - 3 grams Sodium; 1500 mL fluid      Nutrition Related Findings:   Teeth: Dentures upper, Dentures lower   GI symptoms: nausea and emesis.   BM:    Wound Type: none (nursing/wound notes provide further details)    Food allergies: NKFA. has No Known Allergies.  Meds/Labs reviewed.  folic acid, 1 mg, oral, Daily  [Held by provider] metoprolol succinate XL, 50 mg, oral, Daily  metoprolol, 5 mg, intravenous, q6h  multivitamin with minerals, 1 tablet, oral, Daily  pantoprazole, 40 mg, oral, Daily before breakfast  PHENobarbital, 65 mg, intravenous, q8h   Followed by  [START ON 1/12/2024] PHENobarbital, 32.5 mg, intravenous, q8h  [START ON 1/13/2024] thiamine, 100 mg, oral, Daily  thiamine, 100 mg, intravenous, Daily       lactated Ringer's, 75 mL/hr, Last Rate: 75 mL/hr (01/11/24 1514)         Nutrition Significant Labs:    Results from last 7 days   Lab Units 01/11/24  1222 01/11/24  0624 01/11/24  0220 01/10/24  0820 01/10/24  0428 01/09/24  1841 01/09/24  1351   GLUCOSE mg/dL 80 73*  --   --  95  --   --    SODIUM mmol/L 118* 119* 118*   < > 115*  115*   < >  --    POTASSIUM mmol/L 4.6 4.5  --   --  4.3  --   --    CHLORIDE mmol/L 88* 88*  --   --  84*  --   --    CO2 mmol/L 22 25  --   --  24  --   --    BUN mg/dL 15 16  --   --  12  --   --    CREATININE mg/dL 1.20 1.26  --   --  1.31*  --   --    EGFR mL/min/1.73m*2 73 69  --   --  65  --   --    CALCIUM mg/dL 7.5* 7.6*  --   --   "8.2*  --   --    PHOSPHORUS mg/dL  --  4.8  --   --  2.3*  --  2.3*   MAGNESIUM mg/dL  --  2.01  --   --  1.35*  --   --     < > = values in this interval not displayed.     Lab Results   Component Value Date    HGBA1C 5.9 (A) 11/14/2022    HGBA1C 5.8 02/05/2021           Anthropometrics:  Height: 185.4 cm (6' 1\")   Weight: 111 kg (244 lb 11.4 oz)   BMI (Calculated): 32.29  IBW/kg (Dietitian Calculated): 83.6 kg          Weight History:   Wt Readings from Last 10 Encounters:   01/11/24 111 kg (244 lb 11.4 oz)   06/15/23 109 kg (241 lb)   12/15/22 110 kg (242 lb)   11/16/22 112 kg (246 lb)   08/04/22 110 kg (242 lb)   05/05/22 109 kg (240 lb)   03/24/22 108 kg (239 lb)   03/02/22 109 kg (241 lb)   02/16/22 109 kg (241 lb)   12/08/21 110 kg (242 lb 12.8 oz)        Weight Change %:  Weight History / % Weight Change: stable weight indicated per weight record review             Nutrition Focused Physical Exam Findings:  defer: AMS    Estimated Needs:   Total Energy Estimated Needs (kCal):  ([phone])  Method for Estimating Needs: 25-30, IBW  Total Protein Estimated Needs (g):  (100-105)  Method for Estimating Needs: 1.2, IBW  Total Fluid Estimated Needs (mL):  (per physician (hyponatremia))           Nutrition Diagnosis   Nutrition Diagnosis:       Nutrition Diagnosis  Patient has Nutrition Diagnosis: Yes  Diagnosis Status (1): New  Nutrition Diagnosis 1: Predicted inadequate energy intake  Related to (1): AMS, history of ETOH abuse, altered GI function secondary to N/V  As Evidenced by (1): noted decreased oral intake PTA and poor intake since admission       Nutrition Interventions/Recommendations   Nutrition Interventions and Recommendations:        Nutrition Prescription:  Individualized Nutrition Prescription Provided for : Continue cardiac diet with physician ordered fluid restriction.  Monitor for addition of supplements.        Nutrition Interventions:   Food and/or Nutrient Delivery " Interventions  Interventions: Meals and snacks  Meals and Snacks: General healthful diet  Goal: >50% intake of meals    Coordination of Nutrition Care by a Nutrition Professional  Collaboration and Referral of Nutrition Care: Collaboration by nutrition professional with other providers  Goal: IDT rounds    Nutrition Education:   Education Documentation  No documentation found.      Nutrition Counseling  Counseling Theoretical Approach: Other (Comment)  Goal: Patient with AMS       Nutrition Monitoring and Evaluation   Monitoring/Evaluation:   Food/Nutrient Related History Monitoring  Monitoring and Evaluation Plan: Energy intake  Energy Intake: Estimated energy intake  Criteria: meet >50% of estimated needs from diet    Body Composition/Growth/Weight History  Monitoring and Evaluation Plan: Weight  Weight: Weight change  Criteria: Maintain stable weight    Biochemical Data, Medical Tests and Procedures  Monitoring and Evaluation Plan: Electrolyte/renal panel, Glucose/endocrine profile  Electrolyte and Renal Panel: Sodium, Phosphorus, Magnesium, Potassium  Criteria: WNL  Glucose/Endocrine Profile: Glucose, casual  Criteria: WNL    Nutrition Focused Physical Findings  Monitoring and Evaluation Plan: Skin  Criteria: Maintain skin integrity            Time Spent/Follow-up Reminder:   Follow Up  Time Spent (min): 45 minutes  Last Date of Nutrition Visit: 01/11/24  Nutrition Follow-Up Needed?: Dietitian to reassess per policy  Follow up Comment: 1/15-1/16

## 2024-01-11 NOTE — PROGRESS NOTES
Selvin Ochoa is a 53 y.o. male on day 1 of admission presenting with Hyponatremia.    Subjective   Selvin Ochoa is a 53 y.o. male with a PMH of HTN, HLD and EtOH use disorder presenting with weakness.      He reports progressive weakness for the slat three days. The patient reports that he has not been able to keep any solid food down the past 3 days as well.  He has been able to maintain fluids.  The patient drinks 8-10 tall boys per day.  He states that he has cut down.  The patient has had seizure activity when he has tried to quit drinking.  The patient denies any fevers, chills, night cough, or diarrhea.  He reports normal urination.  He noted yesterday that his eyes and chest were turning yellow.  The patient denies any diagnosis of cirrhosis or liver dysfunction.  The patient reports that his last drink was last night but then changed and stated that his last drink was this morning.  He denies any contacts.  He is not having any chest pain, shortness of breath, dizziness, palpitations, paresthesias, focal weakness.  Denies any abdominal pain.       1/10: Patient is now in barb EtOH withdrawal. He required phenobarbital this AM.   1/11: Patient was seen and examined.  Continues to be in alcohol withdrawal.  Sleeping quietly when I saw after getting phenobarb this morning.  Requiring 2 L nasal cannula oxygen to maintain saturation.  Hyponatremia improving with sodium of 119 this morning.  Continue IV LR for now.  Nephrologist on board.       Objective     Physical Exam  Constitutional:       General: He is in acute distress.      Appearance: He is ill-appearing and diaphoretic.   HENT:      Head: Normocephalic and atraumatic.      Mouth/Throat:      Mouth: Mucous membranes are dry.   Eyes:      Extraocular Movements: Extraocular movements intact.      Pupils: Pupils are equal, round, and reactive to light.   Cardiovascular:      Rate and Rhythm: Tachycardia present.      Heart sounds: No murmur heard.     No  "friction rub. No gallop.   Pulmonary:      Effort: Pulmonary effort is normal. No respiratory distress.      Breath sounds: Rales present. No wheezing or rhonchi.   Abdominal:      General: Abdomen is flat. There is no distension.      Palpations: Abdomen is soft.      Tenderness: There is no abdominal tenderness.   Musculoskeletal:         General: No swelling.   Skin:     General: Skin is warm.   Neurological:      Mental Status: He is disoriented.         Last Recorded Vitals  Blood pressure 119/67, pulse 89, temperature 37.2 °C (99 °F), temperature source Temporal, resp. rate 20, height 1.854 m (6' 1\"), weight 111 kg (244 lb 11.4 oz), SpO2 100 %.  Intake/Output last 3 Shifts:  I/O last 3 completed shifts:  In: 1888.8 (17 mL/kg) [P.O.:200; I.V.:1688.8 (15.2 mL/kg)]  Out: 1410 (12.7 mL/kg) [Urine:1410 (0.4 mL/kg/hr)]  Weight: 111 kg     Relevant Results                This patient has a urinary catheter   Reason for the urinary catheter remaining today? critically ill patient who need accurate urinary output measurements               Assessment/Plan   Hypovolemic Hyponatremia (likely beer potomania as well)   -Na 113 on admit   -IV fluid LR for now  -Trend Na Q4, goal correction of 6 to 8 mEq/24hrs  -Continue fluid restriction  -Intensivist and nephrology on board     Alcohol Use Disorder with Risk for withdrawal   -he is now experiencing complex withdrawal   -Continue phenobarbital taper in addition to Lorazepam via the [facility] protocol.     Alcoholic Hepatitis   -DF 20.4 on admit, no indication for steroids at this time   -Hepatitis panel negative  -trend CMP and INR   -additional labs per GI pending   -Imaging with hepatic steatosis   -GI on board    Gastritis   -start PPI      Possible Ileus   -CLD for now   -Will consult surgery if he worsens       YOLANDA   -prerenal in the setting of above   -repleting volume      HTN  -holding home meds with normal BP      DVT ppx   -SCDs    I spent 35 minutes in the follow-up " management of this patient    Tripp Justice MD

## 2024-01-11 NOTE — PROGRESS NOTES
Occupational Therapy                 Therapy Communication Note    Patient Name: Selvin Ochoa  MRN: [id number]  Today's Date: 1/11/2024     Discipline: Occupational Therapy    Missed Visit Reason: Patient placed on medical hold      Comment: Per RN pt. very lethargic this a.m., recommends holding off on therapy until pt.more alert.

## 2024-01-11 NOTE — PROGRESS NOTES
"      Nephrology Progress Note      Nephrology following for hyponatremia.   Events over night: none    Patient is still not back to baseline mental status but is not having as many hallucinations today.    Serum sodium went up steadily in the last 24 hours on the LR but it was stopped 3 hours prior to the last level and his level did drop to 118    Patient is just trying to take p.o. solid food today, had not tried yet when I stopped by to see him    /76   Pulse (!) 111   Temp 37 °C (98.6 °F) (Temporal)   Resp (!) 36   Ht 1.854 m (6' 1\")   Wt 111 kg (244 lb 11.4 oz)   SpO2 95%   BMI 32.29 kg/m²     Input / Output:  24 HR:   Intake/Output Summary (Last 24 hours) at 1/11/2024 1338  Last data filed at 1/11/2024 1310  Gross per 24 hour   Intake 1988.75 ml   Output 1710 ml   Net 278.75 ml       Physical Exam   Alert and oriented x 2 NAD  Neck: no JVD  CV: RRR  Lungs: CTA bilaterally  Abd: soft, NT, ND   Ext: trace lower extremity edema    Scheduled medications  folic acid, 1 mg, oral, Daily  [Held by provider] metoprolol succinate XL, 50 mg, oral, Daily  metoprolol, 5 mg, intravenous, q6h  multivitamin with minerals, 1 tablet, oral, Daily  pantoprazole, 40 mg, oral, Daily before breakfast  PHENobarbital, 65 mg, intravenous, q8h   Followed by  [START ON 1/12/2024] PHENobarbital, 32.5 mg, intravenous, q8h  [START ON 1/13/2024] thiamine, 100 mg, oral, Daily  thiamine, 100 mg, intravenous, Daily      Continuous medications     PRN medications  PRN medications: dextrose 10 % in water (D10W), dextrose, glucagon, LORazepam **OR** LORazepam **OR** LORazepam, ondansetron, oxygen   Results from last 7 days   Lab Units 01/11/24  1222 01/11/24  0624   SODIUM mmol/L 118* 119*   POTASSIUM mmol/L 4.6 4.5   CHLORIDE mmol/L 88* 88*   CO2 mmol/L 22 25   BUN mg/dL 15 16   CREATININE mg/dL 1.20 1.26   CALCIUM mg/dL 7.5* 7.6*   PROTEIN TOTAL g/dL  --  4.5*   BILIRUBIN TOTAL mg/dL  --  16.3*   ALK PHOS U/L  --  182*   ALT U/L  " --  149*   AST U/L  --  246*   GLUCOSE mg/dL 80 73*      Results from last 7 days   Lab Units 01/11/24  0624 01/10/24  0428   MAGNESIUM mg/dL 2.01 1.35*      Results from last 7 days   Lab Units 01/11/24  0624 01/09/24  1349   WBC AUTO x10*3/uL 7.3 10.8   HEMOGLOBIN g/dL 10.0* 12.5*   HEMATOCRIT % 27.2* 33.3*   PLATELETS AUTO x10*3/uL 178 184        Assessment & Plan:     Patient is 53 y.o. male with PMHx of AUD, HTN and HLD presented to the ED on 1/9/24 with complaints of progressive weakness is admitted to hospital for alcoholic hepatitis and hyponatremia. Nephrology consulted in view of hyponatremia.     Hypotonic Hyponatremia  -Suspect chronic issue with baseline serum sodium in the high 120s  -Beer Potomania but he was having poor intake last week with nausea and vomiting  -Na was 113 on admission  -patient received a bolus of NS on 1/9  -started on  mL/hr which ran for approximately 4 hours  -given D5W for the following 7 hours, infusion stopped at 0100 on 1/10/24  -LR set at 75 mL/hr was started yesterday and serum sodium up to 119  -Level did start to drop again off LR now 118   -Sosm 241,  Uoxm 504 Mary < 10  -hypovolemic hyponatremia but this is also could be the acute liver injury playing a role     Hypomagnesemia   -1.35 on 1/10/24     Hypophosphatemia  -in the setting of alcohol use disorder and decreased food intake  -mildly decreased at 2.3     Recommendations:   -continue LR at 75 mL/hr  -q4H Na checks   -replete magnesiumprn  -1500 mL fluid restriction    Please message me through EPIC chat with any questions or concerns.     Nandini Agee DO  1/11/2024  1:38 PM     [facility] [place]    [address], [address]   [place] [address]  Office: [phone]

## 2024-01-11 NOTE — PROGRESS NOTES
Critical Care Daily Progress        Subjective   Patient is a 53 y.o. male admitted on 1/9/2024  1:35 PM with the following indication(s) for ICU care: severe hyponatremia, acute encephalopathy, acute alcohol withdrawal.     Interval History: .   1/10/2024: Selvin Ochoa is a 53 y.o. male with a past medical history of HTN, HLD, and alcohol use disorder was admitted to the ICU because of severe hyponatremia, acute encephalopathy and alcohol withdrawal. On our examination the patient was not able to communicate and not oriented.      1/11/2024: Patient remains disoriented. He continues on the phenobarbital taper which is controlling his withdrawal symptoms well. Sodium was up to 119 this morning. Continue fluid restriction and started him on a cardiac diet to encourage oral intake.      Scheduled Medications:   folic acid, 1 mg, oral, Daily  [Held by provider] metoprolol succinate XL, 50 mg, oral, Daily  metoprolol, 5 mg, intravenous, q6h  multivitamin with minerals, 1 tablet, oral, Daily  pantoprazole, 40 mg, oral, Daily before breakfast  PHENobarbital, 65 mg, intravenous, q8h   Followed by  [START ON 1/12/2024] PHENobarbital, 32.5 mg, intravenous, q8h  [START ON 1/13/2024] thiamine, 100 mg, oral, Daily  thiamine, 100 mg, intravenous, Daily         Continuous Medications:         PRN Medications:   PRN medications: dextrose 10 % in water (D10W), dextrose, glucagon, LORazepam **OR** LORazepam **OR** LORazepam, ondansetron, oxygen    Objective   Vitals:  Most Recent:  Vitals:    01/11/24 1118   BP:    Pulse:    Resp:    Temp: 37 °C (98.6 °F)   SpO2:        24hr Min/Max:  Temp  Min: 36.8 °C (98.2 °F)  Max: 37.7 °C (99.9 °F)  Pulse  Min: 71  Max: 121  BP  Min: 92/46  Max: 133/67  Resp  Min: 11  Max: 56  SpO2  Min: 85 %  Max: 100 %    LDA:  Urethral Catheter Coude 18 Fr. (Active)   Placement Date/Time: 01/10/24 0574   Hand Hygiene Completed: Yes  Catheter Type: Coude  Tube Size (Fr.): 18 Fr.  Catheter Balloon Size: 10 mL    Number of days: 1         Vent settings:  FiO2 (%):  [2 %-28 %] 28 %    Hemodynamic parameters for last 24 hours:       I/O:    Intake/Output Summary (Last 24 hours) at 1/11/2024 1203  Last data filed at 1/11/2024 1118  Gross per 24 hour   Intake 1888.75 ml   Output 1585 ml   Net 303.75 ml       Physical Exam:   Physical Exam  Constitutional:       General: He is not in acute distress.     Appearance: He is ill-appearing.   HENT:      Head: Normocephalic and atraumatic.   Eyes:      General: Scleral icterus present.   Cardiovascular:      Rate and Rhythm: Normal rate and regular rhythm.      Pulses: Normal pulses.   Pulmonary:      Breath sounds: Normal breath sounds.   Abdominal:      General: There is distension.   Musculoskeletal:         General: No swelling.      Cervical back: Normal range of motion and neck supple.   Skin:     General: Skin is warm and dry.      Coloration: Skin is jaundiced.   Neurological:      Mental Status: He is disoriented.          Lab/Radiology/Diagnostic Review:  Results for orders placed or performed during the hospital encounter of 01/09/24 (from the past 24 hour(s))   Sodium   Result Value Ref Range    Sodium 115 (LL) 136 - 145 mmol/L   Blood Gas Venous Full Panel   Result Value Ref Range    POCT pH, Venous 7.40 7.33 - 7.43 pH    POCT pCO2, Venous 40 (L) 41 - 51 mm Hg    POCT pO2, Venous 61 (H) 35 - 45 mm Hg    POCT SO2, Venous 90 (H) 45 - 75 %    POCT Oxy Hemoglobin, Venous 87.1 (H) 45.0 - 75.0 %    POCT Hematocrit Calculated, Venous 33.0 (L) 41.0 - 52.0 %    POCT Sodium, Venous 112 (LL) 136 - 145 mmol/L    POCT Potassium, Venous 4.6 3.5 - 5.3 mmol/L    POCT Chloride, Venous 85 (L) 98 - 107 mmol/L    POCT Ionized Calicum, Venous 1.08 (L) 1.10 - 1.33 mmol/L    POCT Glucose, Venous 80 74 - 99 mg/dL    POCT Lactate, Venous 1.3 0.4 - 2.0 mmol/L    POCT Base Excess, Venous 0.0 -2.0 - 3.0 mmol/L    POCT HCO3 Calculated, Venous 24.8 22.0 - 26.0 mmol/L    POCT Hemoglobin, Venous 10.9  (L) 13.5 - 17.5 g/dL    POCT Anion Gap, Venous 7.0 (L) 10.0 - 25.0 mmol/L    Patient Temperature 37.0 degrees Celsius    FiO2 21 %   Sodium   Result Value Ref Range    Sodium 117 (LL) 136 - 145 mmol/L   Sodium   Result Value Ref Range    Sodium 116 (LL) 136 - 145 mmol/L   Sodium   Result Value Ref Range    Sodium 118 (LL) 136 - 145 mmol/L   Comprehensive Metabolic Panel   Result Value Ref Range    Glucose 73 (L) 74 - 99 mg/dL    Sodium 119 (LL) 136 - 145 mmol/L    Potassium 4.5 3.5 - 5.3 mmol/L    Chloride 88 (L) 98 - 107 mmol/L    Bicarbonate 25 21 - 32 mmol/L    Anion Gap 11 10 - 20 mmol/L    Urea Nitrogen 16 6 - 23 mg/dL    Creatinine 1.26 0.50 - 1.30 mg/dL    eGFR 69 >60 mL/min/1.73m*2    Calcium 7.6 (L) 8.6 - 10.3 mg/dL    Albumin 2.3 (L) 3.4 - 5.0 g/dL    Alkaline Phosphatase 182 (H) 33 - 120 U/L    Total Protein 4.5 (L) 6.4 - 8.2 g/dL     (H) 9 - 39 U/L    Bilirubin, Total 16.3 (H) 0.0 - 1.2 mg/dL     (H) 10 - 52 U/L   Protime-INR   Result Value Ref Range    Protime 12.2 9.8 - 12.8 seconds    INR 1.1 0.9 - 1.1   Magnesium   Result Value Ref Range    Magnesium 2.01 1.60 - 2.40 mg/dL   Phosphorus   Result Value Ref Range    Phosphorus 4.8 2.5 - 4.9 mg/dL   CBC   Result Value Ref Range    WBC 7.3 4.4 - 11.3 x10*3/uL    nRBC 1.4 (H) 0.0 - 0.0 /100 WBCs    RBC 2.99 (L) 4.50 - 5.90 x10*6/uL    Hemoglobin 10.0 (L) 13.5 - 17.5 g/dL    Hematocrit 27.2 (L) 41.0 - 52.0 %    MCV 91 80 - 100 fL    MCH 33.4 26.0 - 34.0 pg    MCHC 36.8 (H) 32.0 - 36.0 g/dL    RDW 15.7 (H) 11.5 - 14.5 %    Platelets 178 150 - 450 x10*3/uL     ECG 12 lead    Result Date: 1/10/2024  Sinus rhythm Probable left atrial enlargement Nonspecific intraventricular conduction delay Borderline repolarization abnormality See ED provider note for full interpretation and clinical correlation Confirmed by Dutch Weston ([id number]) on 1/10/2024 2:07:13 PM    CT head wo IV contrast    Result Date: 1/10/2024  Interpreted By:  Dipesh Hallman, STUDY:  CT HEAD WO IV CONTRAST;  1/10/2024 11:57 am   INDICATION: Signs/Symptoms:Encephalopathy, severe hyponatremia.   COMPARISON: Comparison study is from 03/06/2011..   ACCESSION NUMBER(S): [id number]   ORDERING CLINICIAN: ALO BELLE   TECHNIQUE: Routine axial images were obtained from the skull base through the vertex.  Sagittal and coronal reconstruction images were generated. Brain, subdural, and bone windows were reviewed.   FINDINGS: INTRACRANIAL: Mild prominence of ventricles and sulci. There is mild patchy hypodensity throughout the deep periventricular white matter. No acute intracranial bleed, midline shift, or focal mass effect. No destructive bone lesion. No depressed skull fracture.     EXTRACRANIAL: Moderate fluid in the right maxillary sinus. Small retention cyst in the left maxillary sinus. Ethmoid, frontal, and sphenoid sinuses were clear. Visualized mastoid air cells were clear.       No acute intracranial bleed or focal mass effect.   Mild volume loss.   Mild chronic white matter ischemic disease in the deep periventricular regions.   Acute right maxillary sinusitis. Small left maxillary sinus retention cyst.   MACRO: None   Signed by: Dipesh Hallman 1/10/2024 12:27 PM Dictation workstation:   [id number]    US liver with doppler    Result Date: 1/10/2024  Interpreted By:  Dipesh Hallman, STUDY: US LIVER WITH DOPPLER  1/10/2024 11:54 am   INDICATION: 52 y/o   M with  Signs/Symptoms:liver disease; perform with liver doppler.   COMPARISON: Correlation with CT scan from 01/09/2024. Correlation with renal ultrasound from 03/06/2011.   ACCESSION NUMBER(S): [id number]   ORDERING CLINICIAN: ANABELLE MADERA   TECHNIQUE: Ultrasound of the abdomen was performed using grayscale imaging, color Doppler, and spectral Doppler.   The patient was in restraints and was combative. This did limit the exam somewhat.   FINDINGS: LIVER: Cranialcaudal length:  19.7cm, enlarged. Echogenicity:  Diffusely increased.  Smooth liver margins. Mass: None.   GALLBLADDER: No shadowing stone, gallbladder wall thickening, adjacent edema, or sonographic Bergman sign.   BILE DUCTS: No intrahepatic biliary ductal dilatation. Common bile duct measured 4 mm in diameter. This is within the limits of normal.   PANCREAS: The pancreas was obscured by bowel gas..   PERITONEAL FLUID: No ascites.   SPLEEN: Measures  11.7cm in craniocaudal dimension, which is within normal limits.  No focal splenic lesion identified.   RIGHT KIDNEY: The right kidney measured  12.0 cm in length. It  was sonographically normal for size and echogenicity. There was no shadowing stone, hydronephrosis, or perinephric collection.   LEFT KIDNEY: The left  kidney measured  11.3 cm in length. It was sonographically normal for size and echogenicity. There was no shadowing stone, hydronephrosis, or perinephric collection. Upper pole parapelvic cyst measures 38 x 21 x 31 mm. This is grossly stable.   IVC AND ABDOMINAL AORTA: Not well seen.   HEPATIC ARTERIES: The following demonstrate patency and appropriate direction of flow: Main hepatic artery RI  0.82 which is mildly elevated; Right hepatic artery was not well seen; Left hepatic artery was not well seen;   SPLENIC VEIN: Splenic vein was not well seen   HEPATIC AND PORTAL VENOUS BRANCHES: Right, middle and left hepatic veins were not well seen in this exam. Main portal vein measured  12 mm in diameter. Main portal vein flow was at 10.2   cm/sec. Direction of flow was grossly normal. The left portal vein was not well seen. Anterior branch and posterior branch of the right portal vein were reasonably well seen. Direction of flow was grossly normal. No recanalized periumbilical vein or splenorenal shunt identified.       The patient was in restraints and combative. This did result in a suboptimal exam. Because of this, the right and left hepatic arteries, splenic vein, middle, right, and left hepatic veins, and left portal vein  were not well seen. Also, the pancreas was not well seen. Finally, the abdominal aorta and IVC were not well seen.   Blood flow in the main portal vein and the right portal veins was grossly normal in direction.   Main hepatic artery resistive index was mildly elevated. Significance of this is uncertain.   Hepatomegaly.  Nonspecific increased echogenicity throughout the liver, most likely fatty infiltration. Liver margins were smooth. No focal hepatic mass in this limited exam.   No splenomegaly. No ascites.   Grossly stable upper pole parapelvic left renal cyst.       MACRO: None   Signed by: Dipesh Hallman 1/10/2024 12:25 PM Dictation workstation:   [id number]    CT abdomen pelvis w IV contrast    Result Date: 1/9/2024  STUDY: CT Abdomen and Pelvis with IV Contrast; 1/9/2024 at 4:47 PM. INDICATION: Abdominal pain, jaundice. COMPARISON: None available. ACCESSION NUMBER(S): [id number] ORDERING CLINICIAN: NURY HERNANDEZ TECHNIQUE: CT of the abdomen and pelvis was performed.  Contiguous axial images were obtained at 3 mm slice thickness through the abdomen and pelvis. Coronal and sagittal reconstructions at 3 mm slice thickness were performed.  Omnipaque 350 100 mL was administered intravenously.  FINDINGS: LOWER CHEST: No cardiomegaly.  No pericardial effusion.  Lung bases are clear.  ABDOMEN:  LIVER: No hepatomegaly.  Smooth surface contour.  Severe fatty infiltration of the liver.  BILE DUCTS: No intrahepatic or extrahepatic biliary ductal dilatation.  GALLBLADDER: The gallbladder is present without gallstones. STOMACH: Mild gastric wall thickening. PANCREAS: No masses or ductal dilatation.  SPLEEN: No splenomegaly or focal splenic lesion.  ADRENAL GLANDS: No thickening or nodules.  KIDNEYS AND URETERS: Kidneys are normal in size and location.  No renal or ureteral calculi.  Renal cysts measuring up to 3 cm on the left.  PELVIS:  BLADDER: No abnormalities identified.  REPRODUCTIVE ORGANS: No abnormalities  identified.  BOWEL: Mildly dilated fluid-filled loops of small bowel centrally measuring up to 3.5 cm without a sharp transition point  VESSELS: No abnormalities identified.  Abdominal aorta is normal in caliber.  PERITONEUM/RETROPERITONEUM/LYMPH NODES: No free fluid.  No pneumoperitoneum. No lymphadenopathy.  ABDOMINAL WALL: No abnormalities identified. SOFT TISSUES: No abnormalities identified.  BONES: No acute fracture or aggressive osseous lesion.    1. Mild gastric wall thickening.  Correlate clinically for gastritis. 2. Mildly dilated fluid-filled loops of small bowel centrally measuring up to 3.5 cm without a sharp transition point. Findings suggestive of a mild ileus.  If symptoms do not improve/resolve, recommend repeat CT with oral contrast to assess transit of contrast through the dilated small bowel loops. 3. Severe hepatic steatosis.  No other CT findings to account for the patient's reported jaundice.  No biliary dilatation. Signed by Luis Bhardwaj MD                     Assessment/Plan   Principal Problem:    Hyponatremia    Selvin Ochoa is a 53 y.o. male with a past medical history of HTN, HLD, and alcohol use disorder who presented to the [facility] ED on 1/9/2024 for progressive weakness of three days. He was admitted to the ICU for hyponatremia with a sodium of 113 on admission, as well as acute encephalopathy and alcohol withdrawal.     1. Hyponatremia likely secondary to beer potomania and poor oral intake  1/10/2024: Patient received 1L of NS in the ED.  Nephrology was consulted.  Most recent Na level was 115 at 8 am.   Continue to trend sodium every 4 hours.  Do not believe the patient has symptomatic hyponatremia as his altered mental status may be secondary to his acute alcohol withdrawal, therefore no need for hypertonic saline.  Serum and urine osmolality labs pending.  Goal correction rate is no more than 6 mEq/L in 24 hours.  Fluid restriction to 1.5L per day.  1/11/2024: Sodium  improved to 119 this morning.  Check sodium every 6 hours.  Discontinued fluids for now since urine osmolality is high indicating a component of SIADH and the patient is retaining free water and has hypertonic urine.  Started cardiac diet to encourage oral intake.  Goal correction rate is no more than 6 mEq/L in 24 hours.   Okay to correct sodium to a maximum of 125 until 2pm today 1/11/2024 and up to 131 by 2pm on 1/12/2024.  Fluid restriction to 1.5L per day.    2. Acute metabolic encephalopathy likely secondary to alcohol withdrawal  1/10/2024: CT scan of the head revealed no acute pathology.  Suspect patient's altered mental status is due to him being in acute alcohol withdrawal.   He is at high risk for serious withdrawal and therefore has been placed on phenobarbital taper. 1/11/2024: Patient remains disoriented and is actively going through withdrawal.  Suspect mental status to improve over the next couple of days.  Continue phenobarbital taper.  Ordered ammonia level.     3. Acute alcohol withdrawal and alcohol use disorder  1/10/2024: Continue CIWA Protocol and Phenobarbital taper.  Thiamine, folic acid, multivitamin.  Currently in restraints due to agitation.     4. Alcoholic hepatitis  1/10/2024: CT scan indicated hepatic steatosis and GI was consulted.  Right upper quadrant ultrasound was ordered by GI and did not reveal any acute pathology.  No indication for prednisolone as MDF score is 18.7.  Full hepatic panel pending.  Encourage strict abstinence.  Social work consulted.     5.  Alcohol induced gastritis  1/10/2024: CT scan of abdomen showed evidence of gastritis  Continue IV Protonix  GI was consulted and is following    MEDHAT Almanzar IV     This note in incomplete until reviewed and revised by attending physician.

## 2024-01-12 ENCOUNTER — APPOINTMENT (OUTPATIENT)
Dept: RADIOLOGY | Facility: HOSPITAL | Age: 54
End: 2024-01-12
Payer: MEDICAID

## 2024-01-12 LAB
ALBUMIN SERPL BCP-MCNC: 2.4 G/DL (ref 3.4–5)
ALP SERPL-CCNC: 201 U/L (ref 33–120)
ALT SERPL W P-5'-P-CCNC: 145 U/L (ref 10–52)
ANA PATTERN: ABNORMAL
ANA SER QL HEP2 SUBST: POSITIVE
ANA TITR SER IF: ABNORMAL {TITER}
ANION GAP SERPL CALC-SCNC: 10 MMOL/L (ref 10–20)
ANION GAP SERPL CALC-SCNC: 9 MMOL/L (ref 10–20)
AST SERPL W P-5'-P-CCNC: 214 U/L (ref 9–39)
BASOPHILS # BLD AUTO: 0.13 X10*3/UL (ref 0–0.1)
BASOPHILS NFR BLD AUTO: 2.1 %
BILIRUB DIRECT SERPL-MCNC: 9.2 MG/DL (ref 0–0.3)
BILIRUB SERPL-MCNC: 16.3 MG/DL (ref 0–1.2)
BUN SERPL-MCNC: 13 MG/DL (ref 6–23)
BUN SERPL-MCNC: 14 MG/DL (ref 6–23)
CALCIUM SERPL-MCNC: 7.6 MG/DL (ref 8.6–10.3)
CALCIUM SERPL-MCNC: 7.7 MG/DL (ref 8.6–10.3)
CENTROMERE B AB SER-ACNC: <0.2 AI
CHLORIDE SERPL-SCNC: 91 MMOL/L (ref 98–107)
CHLORIDE SERPL-SCNC: 93 MMOL/L (ref 98–107)
CHROMATIN AB SERPL-ACNC: <0.2 AI
CO2 SERPL-SCNC: 29 MMOL/L (ref 21–32)
CO2 SERPL-SCNC: 29 MMOL/L (ref 21–32)
CREAT SERPL-MCNC: 1.02 MG/DL (ref 0.5–1.3)
CREAT SERPL-MCNC: 1.11 MG/DL (ref 0.5–1.3)
DSDNA AB SER-ACNC: <1 IU/ML
EGFRCR SERPLBLD CKD-EPI 2021: 80 ML/MIN/1.73M*2
EGFRCR SERPLBLD CKD-EPI 2021: 88 ML/MIN/1.73M*2
ENA JO1 AB SER QL IA: <0.2 AI
ENA RNP AB SER IA-ACNC: <0.2 AI
ENA SCL70 AB SER QL IA: <0.2 AI
ENA SM AB SER IA-ACNC: <0.2 AI
ENA SM+RNP AB SER QL IA: <0.2 AI
ENA SS-A AB SER IA-ACNC: <0.2 AI
ENA SS-B AB SER IA-ACNC: <0.2 AI
EOSINOPHIL # BLD AUTO: 0.15 X10*3/UL (ref 0–0.7)
EOSINOPHIL NFR BLD AUTO: 2.4 %
ERYTHROCYTE [DISTWIDTH] IN BLOOD BY AUTOMATED COUNT: 15.9 % (ref 11.5–14.5)
GLUCOSE SERPL-MCNC: 100 MG/DL (ref 74–99)
GLUCOSE SERPL-MCNC: 86 MG/DL (ref 74–99)
HCT VFR BLD AUTO: 29.7 % (ref 41–52)
HGB BLD-MCNC: 10.4 G/DL (ref 13.5–17.5)
HYPOCHROMIA BLD QL SMEAR: NORMAL
IMM GRANULOCYTES # BLD AUTO: 0.41 X10*3/UL (ref 0–0.7)
IMM GRANULOCYTES NFR BLD AUTO: 6.6 % (ref 0–0.9)
INR PPP: 1.1 (ref 0.9–1.1)
LYMPHOCYTES # BLD AUTO: 1.35 X10*3/UL (ref 1.2–4.8)
LYMPHOCYTES NFR BLD AUTO: 21.7 %
MAGNESIUM SERPL-MCNC: 2.03 MG/DL (ref 1.6–2.4)
MCH RBC QN AUTO: 32.7 PG (ref 26–34)
MCHC RBC AUTO-ENTMCNC: 35 G/DL (ref 32–36)
MCV RBC AUTO: 93 FL (ref 80–100)
MITOCHONDRIA AB SER QL IF: NEGATIVE
MONOCYTES # BLD AUTO: 1.31 X10*3/UL (ref 0.1–1)
MONOCYTES NFR BLD AUTO: 21 %
NEUTROPHILS # BLD AUTO: 2.88 X10*3/UL (ref 1.2–7.7)
NEUTROPHILS NFR BLD AUTO: 46.2 %
NRBC BLD-RTO: 3.7 /100 WBCS (ref 0–0)
PHOSPHATE SERPL-MCNC: 3.6 MG/DL (ref 2.5–4.9)
PLATELET # BLD AUTO: 215 X10*3/UL (ref 150–450)
POTASSIUM SERPL-SCNC: 3.9 MMOL/L (ref 3.5–5.3)
POTASSIUM SERPL-SCNC: 4.5 MMOL/L (ref 3.5–5.3)
PROT SERPL-MCNC: 4.5 G/DL (ref 6.4–8.2)
PROTHROMBIN TIME: 12.3 SECONDS (ref 9.8–12.8)
RBC # BLD AUTO: 3.18 X10*6/UL (ref 4.5–5.9)
RBC MORPH BLD: NORMAL
RIBOSOMAL P AB SER-ACNC: <0.2 AI
SODIUM SERPL-SCNC: 125 MMOL/L (ref 136–145)
SODIUM SERPL-SCNC: 127 MMOL/L (ref 136–145)
TARGETS BLD QL SMEAR: NORMAL
WBC # BLD AUTO: 6.2 X10*3/UL (ref 4.4–11.3)

## 2024-01-12 PROCEDURE — 97162 PT EVAL MOD COMPLEX 30 MIN: CPT | Mod: GP

## 2024-01-12 PROCEDURE — 80053 COMPREHEN METABOLIC PANEL: CPT | Performed by: INTERNAL MEDICINE

## 2024-01-12 PROCEDURE — 36415 COLL VENOUS BLD VENIPUNCTURE: CPT | Performed by: INTERNAL MEDICINE

## 2024-01-12 PROCEDURE — 99291 CRITICAL CARE FIRST HOUR: CPT | Performed by: INTERNAL MEDICINE

## 2024-01-12 PROCEDURE — 2500000004 HC RX 250 GENERAL PHARMACY W/ HCPCS (ALT 636 FOR OP/ED): Performed by: STUDENT IN AN ORGANIZED HEALTH CARE EDUCATION/TRAINING PROGRAM

## 2024-01-12 PROCEDURE — 2500000004 HC RX 250 GENERAL PHARMACY W/ HCPCS (ALT 636 FOR OP/ED): Performed by: INTERNAL MEDICINE

## 2024-01-12 PROCEDURE — 2060000001 HC INTERMEDIATE ICU ROOM DAILY

## 2024-01-12 PROCEDURE — 2500000001 HC RX 250 WO HCPCS SELF ADMINISTERED DRUGS (ALT 637 FOR MEDICARE OP): Performed by: INTERNAL MEDICINE

## 2024-01-12 PROCEDURE — 2500000005 HC RX 250 GENERAL PHARMACY W/O HCPCS: Performed by: INTERNAL MEDICINE

## 2024-01-12 PROCEDURE — 97166 OT EVAL MOD COMPLEX 45 MIN: CPT | Mod: GO

## 2024-01-12 PROCEDURE — 84100 ASSAY OF PHOSPHORUS: CPT | Performed by: INTERNAL MEDICINE

## 2024-01-12 PROCEDURE — 83735 ASSAY OF MAGNESIUM: CPT | Performed by: INTERNAL MEDICINE

## 2024-01-12 PROCEDURE — 1100000001 HC PRIVATE ROOM DAILY

## 2024-01-12 PROCEDURE — 85025 COMPLETE CBC W/AUTO DIFF WBC: CPT | Performed by: INTERNAL MEDICINE

## 2024-01-12 PROCEDURE — 99233 SBSQ HOSP IP/OBS HIGH 50: CPT | Performed by: INTERNAL MEDICINE

## 2024-01-12 PROCEDURE — 71045 X-RAY EXAM CHEST 1 VIEW: CPT

## 2024-01-12 PROCEDURE — 80048 BASIC METABOLIC PNL TOTAL CA: CPT | Mod: CCI | Performed by: INTERNAL MEDICINE

## 2024-01-12 PROCEDURE — 92610 EVALUATE SWALLOWING FUNCTION: CPT | Mod: GN | Performed by: PHARMACIST

## 2024-01-12 PROCEDURE — C9113 INJ PANTOPRAZOLE SODIUM, VIA: HCPCS | Performed by: STUDENT IN AN ORGANIZED HEALTH CARE EDUCATION/TRAINING PROGRAM

## 2024-01-12 PROCEDURE — 71045 X-RAY EXAM CHEST 1 VIEW: CPT | Performed by: RADIOLOGY

## 2024-01-12 PROCEDURE — 2500000001 HC RX 250 WO HCPCS SELF ADMINISTERED DRUGS (ALT 637 FOR MEDICARE OP): Performed by: PHARMACIST

## 2024-01-12 PROCEDURE — 2500000001 HC RX 250 WO HCPCS SELF ADMINISTERED DRUGS (ALT 637 FOR MEDICARE OP)

## 2024-01-12 PROCEDURE — 85610 PROTHROMBIN TIME: CPT | Performed by: INTERNAL MEDICINE

## 2024-01-12 PROCEDURE — 82248 BILIRUBIN DIRECT: CPT | Performed by: INTERNAL MEDICINE

## 2024-01-12 PROCEDURE — 99232 SBSQ HOSP IP/OBS MODERATE 35: CPT | Performed by: PHYSICIAN ASSISTANT

## 2024-01-12 RX ORDER — PANTOPRAZOLE SODIUM 40 MG/10ML
40 INJECTION, POWDER, LYOPHILIZED, FOR SOLUTION INTRAVENOUS DAILY
Status: DISCONTINUED | OUTPATIENT
Start: 2024-01-12 | End: 2024-01-23 | Stop reason: HOSPADM

## 2024-01-12 RX ORDER — CHLORDIAZEPOXIDE HYDROCHLORIDE 25 MG/1
25 CAPSULE, GELATIN COATED ORAL EVERY 8 HOURS
Status: DISPENSED | OUTPATIENT
Start: 2024-01-12 | End: 2024-01-14

## 2024-01-12 RX ADMIN — LORAZEPAM 2 MG: 2 INJECTION INTRAMUSCULAR; INTRAVENOUS at 00:17

## 2024-01-12 RX ADMIN — PANTOPRAZOLE SODIUM 40 MG: 40 INJECTION, POWDER, FOR SOLUTION INTRAVENOUS at 08:33

## 2024-01-12 RX ADMIN — METOPROLOL SUCCINATE 50 MG: 50 TABLET, EXTENDED RELEASE ORAL at 14:00

## 2024-01-12 RX ADMIN — Medication 1 TABLET: at 08:32

## 2024-01-12 RX ADMIN — METOPROLOL TARTRATE 5 MG: 5 INJECTION INTRAVENOUS at 08:32

## 2024-01-12 RX ADMIN — FOLIC ACID 1 MG: 1 TABLET ORAL at 08:32

## 2024-01-12 RX ADMIN — THIAMINE HYDROCHLORIDE 100 MG: 100 INJECTION, SOLUTION INTRAMUSCULAR; INTRAVENOUS at 08:32

## 2024-01-12 RX ADMIN — CHLORDIAZEPOXIDE HYDROCHLORIDE 25 MG: 25 CAPSULE ORAL at 14:00

## 2024-01-12 RX ADMIN — LORAZEPAM 2 MG: 2 INJECTION INTRAMUSCULAR; INTRAVENOUS at 05:19

## 2024-01-12 RX ADMIN — CHLORDIAZEPOXIDE HYDROCHLORIDE 25 MG: 25 CAPSULE ORAL at 21:05

## 2024-01-12 RX ADMIN — PHENOBARBITAL SODIUM 65 MG: 65 INJECTION INTRAMUSCULAR at 08:32

## 2024-01-12 ASSESSMENT — COGNITIVE AND FUNCTIONAL STATUS - GENERAL
TURNING FROM BACK TO SIDE WHILE IN FLAT BAD: TOTAL
TOILETING: TOTAL
PERSONAL GROOMING: TOTAL
MOBILITY SCORE: 7
MOVING FROM LYING ON BACK TO SITTING ON SIDE OF FLAT BED WITH BEDRAILS: A LOT
DRESSING REGULAR UPPER BODY CLOTHING: A LOT
EATING MEALS: A LOT
CLIMB 3 TO 5 STEPS WITH RAILING: TOTAL
WALKING IN HOSPITAL ROOM: TOTAL
DAILY ACTIVITIY SCORE: 9
MOVING TO AND FROM BED TO CHAIR: TOTAL
DRESSING REGULAR LOWER BODY CLOTHING: TOTAL
HELP NEEDED FOR BATHING: A LOT
STANDING UP FROM CHAIR USING ARMS: TOTAL

## 2024-01-12 ASSESSMENT — LIFESTYLE VARIABLES
AGITATION: NORMAL ACTIVITY
HEADACHE, FULLNESS IN HEAD: NOT PRESENT
TOTAL SCORE: 10
ANXIETY: MILDLY ANXIOUS
AUDITORY DISTURBANCES: NOT PRESENT
ORIENTATION AND CLOUDING OF SENSORIUM: CANNOT DO SERIAL ADDITIONS OR IS UNCERTAIN ABOUT DATE
TREMOR: 3
PAROXYSMAL SWEATS: 2
HEADACHE, FULLNESS IN HEAD: NOT PRESENT
VISUAL DISTURBANCES: NOT PRESENT
PAROXYSMAL SWEATS: BARELY PERCEPTIBLE SWEATING, PALMS MOIST
VISUAL DISTURBANCES: NOT PRESENT
ORIENTATION AND CLOUDING OF SENSORIUM: DISORIENTED FOR DATA BY NO MORE THAN 2 CALENDAR DAYS
AUDITORY DISTURBANCES: NOT PRESENT
TREMOR: MODERATE, WITH PATIENT'S ARMS EXTENDED
AGITATION: NORMAL ACTIVITY
ANXIETY: 2
TOTAL SCORE: 10
NAUSEA AND VOMITING: MILD NAUSEA WITH NO VOMITING
NAUSEA AND VOMITING: 3

## 2024-01-12 ASSESSMENT — PAIN SCALES - GENERAL
PAINLEVEL_OUTOF10: 0 - NO PAIN

## 2024-01-12 ASSESSMENT — PAIN - FUNCTIONAL ASSESSMENT
PAIN_FUNCTIONAL_ASSESSMENT: 0-10

## 2024-01-12 ASSESSMENT — ACTIVITIES OF DAILY LIVING (ADL)
ADL_ASSISTANCE: INDEPENDENT
BATHING_ASSISTANCE: MAXIMAL

## 2024-01-12 NOTE — PROGRESS NOTES
Physical Therapy    Physical Therapy Evaluation    Patient Name: Selvin cOhoa  MRN: [id number]  Today's Date: 1/12/2024   Time Calculation  Start Time: 1404 (Simultaneous filing. User may not have seen previous data.)  Stop Time: 1428 (Simultaneous filing. User may not have seen previous data.)  Time Calculation (min): 24 min (Simultaneous filing. User may not have seen previous data.)    Assessment/Plan   PT Assessment  PT Assessment Results: Decreased strength, Decreased endurance, Impaired balance, Decreased mobility, Decreased coordination, Decreased cognition, Decreased safety awareness, Impaired judgement  Rehab Prognosis: Fair  Evaluation/Treatment Tolerance: Patient limited by fatigue  End of Session Communication: Bedside nurse  Assessment Comment: HIGH FALLRISK NEEDS MOD X2 TO STAND EOB AND TAKE A FEW STEPS TO HOB, KNEES BUCKLING WAS INDEP PRIOR TO  ILLNESS WILLNEED MOD SKILLED REHAB ON DISCH  End of Session Patient Position: Bed, 4 rail up, Alarm on (CALL LIGHT IN REACH RN IN ROOM TO WORK W/ PT.)  IP OR SWING BED PT PLAN  Inpatient or Swing Bed: Inpatient  PT Plan  Treatment/Interventions: Bed mobility, Transfer training, Gait training, Endurance training, Strengthening  PT Plan: Skilled PT  PT Frequency: 4 times per week  PT Discharge Recommendations: Moderate intensity level of continued care  Equipment Recommended upon Discharge:  (TBD)  PT Recommended Transfer Status: Assist x2  PT - OK to Discharge: Yes (WHEN MEDICALLY CLEARED)      Subjective   General Visit Information:  General  Reason for Referral: IMPAIRED MOBILITY GAIT TRAINING  Referred By: ASHVIN  Past Medical History Relevant to Rehab: WEAK; DX: HYPONATREMIA, ETOH WD AND HEPATITIS, R/O ILEUS, YOLANDA, ENCEPHALOPATHY; HX: HTN ETOH  Co-Treatment: OT  Co-Treatment Reason: FACILITATE  MOBILITY SAFETY  Prior to Session Communication: Bedside nurse  Patient Position Received: Alarm on, Bed, 4 rail up (SLEEPY AWAKENS W/ ENCOURAGEMENT/CONVERSATION, HAS  IV ROOM 1002 ICU, RN OK'S THERAPY)  General Comment: LETHARGIC AT TIMES DURING SESSIN SKIN ORANGE TINGED  Home Living:  Home Living  Home Living Comments:  (PER PT. LIVES ALONE IN HOUSE HAS STEPS TO ENTER, HE HANGS OUT IN HIS GARAGE AND IS INDEP W/ AMB AND ADL)     Precautions:  Precautions  Medical Precautions: Fall precautions, Seizure precautions       Objective   Pain:  Pain Assessment  Pain Score: 0 - No pain  Cognition:  Cognition  Overall Cognitive Status: Impaired  Orientation Level: Disoriented to place, Disoriented to time, Disoriented to situation  Following Commands: Follows one step commands with repetition  Safety Judgment: Decreased awareness of need for assistance  Problem Solving: Assistance required to identify errors made  Attention: Exceptions to WFL  Sustained Attention: Impaired  Memory: Exceptions to WFL  Long-Term Memory: Impaired  Short-Term Memory: Impaired  Safety/Judgement: Exceptions to WFL  Complex Functional Tasks: Moderate  Novel Situations: Moderate  Routine Tasks: Moderate  Insight: Moderate, Severe  Impulsive: Moderately  Processing Speed: Delayed                  Activity Tolerance  Endurance: Tolerates 10 - 20 min exercise with multiple rests    Sensation  Sensation Comment: NO C/O    Strength  Strength Comments: ROM LEGS WFL, STRENGTH 3/5 DECREASED MUSCUALR ENDURANCE  Strength  Strength Comments: ROM LEGS WFL, STRENGTH 3/5 DECREASED MUSCUALR ENDURANCE                     Static Sitting Balance  Static Sitting-Comment/Number of Minutes: FAIR  Dynamic Sitting Balance  Dynamic Sitting-Comments: FAIR-    Static Standing Balance  Static Standing-Comment/Number of Minutes: POOR  Dynamic Standing Balance  Dynamic Standing-Comments: POOR  Functional Assessments:  Bed Mobility  Bed Mobility:  (MAX X2 SUPINE<.SIT AND FOR SCOOT UP IN BED, SAT EOB 15 MIN CGA WHILE HAIR WAS COMBED AND BRAIDED, STATES FLET GOOD TO SIT)    Transfers  Transfer:  (SIT<>STAND MOD X2 ARM IN ARM, KNEES BUCKLE, WIDE  SHANTE, MOD RETRO LEAN)    Ambulation/Gait Training  Ambulation/Gait Training Performed:  (MOD X2 ARMINARM AMB 2 FT TO R TOWARDS HOB, WIDE SHANTE SLIDES FEET ON FLOOR KNEES BUCKLING, TAKES SUPPORT ON BACK OF LEGS FROM BED, MOD RETOR LEAN)                        Outcome Measures:  [facility] Basic Mobility  Turning from your back to your side while in a flat bed without using bedrails: A lot  Moving from lying on your back to sitting on the side of a flat bed without using bedrails: Total  Moving to and from bed to chair (including a wheelchair): Total  Standing up from a chair using your arms (e.g. wheelchair or bedside chair): Total  To walk in hospital room: Total  Climbing 3-5 steps with railing: Total  Basic Mobility - Total Score: 7    Encounter Problems       Encounter Problems (Active)       Mobility       STG - Patient will ambulate (Not Progressing)       Start:  01/12/24    Expected End:  02/02/24       FWW MIN X2 75 FT         STRENGTHENING (Not Progressing)       Start:  01/12/24    Expected End:  02/02/24       20+ REPS EX INCREASING STRENGTH TO PROGRESS TO OOB ACTIVITIES            Transfers       STG - Transfer from bed to chair (Not Progressing)       Start:  01/12/24    Expected End:  01/26/24       FWW MIN X1-2A         STG - Patient to transfer to and from sit to supine (Not Progressing)       Start:  01/12/24    Expected End:  01/19/24       MIN A X1 USING RAILS HOB FLAT         STG - Patient will transfer sit to and from stand (Not Progressing)       Start:  01/12/24    Expected End:  01/22/24       FWW MIN 1-2 A USING PROPER TECHNIQUE                Education Documentation  Mobility Training, taught by Mona Martinez, PT at 1/12/2024  3:47 PM.  Learner: Patient  Readiness: Acceptance  Method: Explanation  Response: Needs Reinforcement  Comment: ROLE OF IP REHAB IN HIS RECOVERY, IMPORTANCE OF MOBILITY IN RECOVERY    Education Comments  No comments found.

## 2024-01-12 NOTE — PROGRESS NOTES
"Selvin Ochoa is a 53 y.o. male on day 2 of admission presenting with Hyponatremia.    Subjective   Patient seen and examined. He remains very somnolent, but is able to awaken and state his name and that he is in [place]. There is no report of GI bleeding. No family present at bedside.     10 point ROS unable to be determined due to mental status  Objective     Physical Exam  Vitals reviewed.   Constitutional:       General: He is not in acute distress.     Appearance: Normal appearance.   Cardiovascular:      Rate and Rhythm: Normal rate and regular rhythm.   Pulmonary:      Effort: Pulmonary effort is normal.      Breath sounds: Normal breath sounds.      Comments: Some accessory abdominal muscle use during breathing  Abdominal:      General: Bowel sounds are normal. There is no distension.      Palpations: Abdomen is soft.      Tenderness: There is no abdominal tenderness. There is no guarding or rebound.   Skin:     Coloration: Skin is jaundiced.   Neurological:      Mental Status: He is alert.      Comments: Patient awakens and is able to tell me his name is Selvin and he is in [place]   Psychiatric:      Comments: Unable to determine due to mental status         Last Recorded Vitals  Blood pressure 114/70, pulse 86, temperature 37.2 °C (99 °F), resp. rate 25, height 1.854 m (6' 1\"), weight 111 kg (244 lb 11.4 oz), SpO2 92 %.  Intake/Output last 3 Shifts:  I/O last 3 completed shifts:  In: 3470 (31.3 mL/kg) [P.O.:1020; I.V.:2450 (22.1 mL/kg)]  Out: 2560 (23.1 mL/kg) [Urine:2560 (0.6 mL/kg/hr)]  Weight: 111 kg     Relevant Results      Scheduled medications  folic acid, 1 mg, oral, Daily  [Held by provider] metoprolol succinate XL, 50 mg, oral, Daily  metoprolol, 5 mg, intravenous, q6h  multivitamin with minerals, 1 tablet, oral, Daily  pantoprazole, 40 mg, intravenous, Daily  PHENobarbital, 32.5 mg, intravenous, q8h  [START ON 1/13/2024] thiamine, 100 mg, oral, Daily      Continuous medications     PRN medications  PRN " medications: dextrose 10 % in water (D10W), dextrose, glucagon, LORazepam **OR** LORazepam **OR** LORazepam, ondansetron, oxygen    Results for orders placed or performed during the hospital encounter of 01/09/24 (from the past 24 hour(s))   Basic Metabolic Panel   Result Value Ref Range    Glucose 80 74 - 99 mg/dL    Sodium 118 (LL) 136 - 145 mmol/L    Potassium 4.6 3.5 - 5.3 mmol/L    Chloride 88 (L) 98 - 107 mmol/L    Bicarbonate 22 21 - 32 mmol/L    Anion Gap 13 10 - 20 mmol/L    Urea Nitrogen 15 6 - 23 mg/dL    Creatinine 1.20 0.50 - 1.30 mg/dL    eGFR 73 >60 mL/min/1.73m*2    Calcium 7.5 (L) 8.6 - 10.3 mg/dL   Ammonia   Result Value Ref Range    Ammonia 79 (H) 16 - 53 umol/L   Basic Metabolic Panel   Result Value Ref Range    Glucose 96 74 - 99 mg/dL    Sodium 122 (L) 136 - 145 mmol/L    Potassium 4.4 3.5 - 5.3 mmol/L    Chloride 92 (L) 98 - 107 mmol/L    Bicarbonate 25 21 - 32 mmol/L    Anion Gap 9 (L) 10 - 20 mmol/L    Urea Nitrogen 15 6 - 23 mg/dL    Creatinine 1.08 0.50 - 1.30 mg/dL    eGFR 83 >60 mL/min/1.73m*2    Calcium 7.2 (L) 8.6 - 10.3 mg/dL   Protime-INR   Result Value Ref Range    Protime 12.3 9.8 - 12.8 seconds    INR 1.1 0.9 - 1.1   Magnesium   Result Value Ref Range    Magnesium 2.03 1.60 - 2.40 mg/dL   Phosphorus   Result Value Ref Range    Phosphorus 3.6 2.5 - 4.9 mg/dL   Comprehensive metabolic panel   Result Value Ref Range    Glucose 86 74 - 99 mg/dL    Sodium 125 (L) 136 - 145 mmol/L    Potassium 4.5 3.5 - 5.3 mmol/L    Chloride 91 (L) 98 - 107 mmol/L    Bicarbonate 29 21 - 32 mmol/L    Anion Gap 10 10 - 20 mmol/L    Urea Nitrogen 14 6 - 23 mg/dL    Creatinine 1.11 0.50 - 1.30 mg/dL    eGFR 80 >60 mL/min/1.73m*2    Calcium 7.7 (L) 8.6 - 10.3 mg/dL    Albumin 2.4 (L) 3.4 - 5.0 g/dL    Alkaline Phosphatase 201 (H) 33 - 120 U/L    Total Protein 4.5 (L) 6.4 - 8.2 g/dL     (H) 9 - 39 U/L    Bilirubin, Total 16.3 (H) 0.0 - 1.2 mg/dL     (H) 10 - 52 U/L   CBC and Auto Differential  "  Result Value Ref Range    WBC 6.2 4.4 - 11.3 x10*3/uL    nRBC 3.7 (H) 0.0 - 0.0 /100 WBCs    RBC 3.18 (L) 4.50 - 5.90 x10*6/uL    Hemoglobin 10.4 (L) 13.5 - 17.5 g/dL    Hematocrit 29.7 (L) 41.0 - 52.0 %    MCV 93 80 - 100 fL    MCH 32.7 26.0 - 34.0 pg    MCHC 35.0 32.0 - 36.0 g/dL    RDW 15.9 (H) 11.5 - 14.5 %    Platelets 215 150 - 450 x10*3/uL    Neutrophils % 46.2 40.0 - 80.0 %    Immature Granulocytes %, Automated 6.6 (H) 0.0 - 0.9 %    Lymphocytes % 21.7 13.0 - 44.0 %    Monocytes % 21.0 2.0 - 10.0 %    Eosinophils % 2.4 0.0 - 6.0 %    Basophils % 2.1 0.0 - 2.0 %    Neutrophils Absolute 2.88 1.20 - 7.70 x10*3/uL    Immature Granulocytes Absolute, Automated 0.41 0.00 - 0.70 x10*3/uL    Lymphocytes Absolute 1.35 1.20 - 4.80 x10*3/uL    Monocytes Absolute 1.31 (H) 0.10 - 1.00 x10*3/uL    Eosinophils Absolute 0.15 0.00 - 0.70 x10*3/uL    Basophils Absolute 0.13 (H) 0.00 - 0.10 x10*3/uL   Bilirubin, Direct   Result Value Ref Range    Bilirubin, Direct 9.2 (H) 0.0 - 0.3 mg/dL   Morphology   Result Value Ref Range    RBC Morphology See Below     Hypochromia Mild     Target Cells Few          * Cannot find OR log *  Last relevant procedure:                          Assessment/Plan   Principal Problem:    Hyponatremia    Selvin Ochoa is a 53 y.o. male with a significant past medical history of hypertension, hyperlipidemia and EtOH abuse who presented with weakness. GI was consulted for \" alcohol hepatitis\".        Etoh Hepatitis   MDF today at 22.3 indicating good prognosis with no current indication for prednisolone. Acute hepatitis panel negative. Additional liver labs pending at this time. RUQ US showed hepatomegaly and a fatty liver. Bilirubun up slightly today, transaminases and alk phos slightly better.   - Supportive care   - Daily LFTs and INR     2. EtOH abuse  Actively drinking prior to admissionrStrict abstinence is needed.  - agree with monitoring for EtOH withdrawal and medicating based on CIWA scale  - " continue with thiamine and folate  - abstinence is essential and social work should be involved to provide resources for quitting including AA     3. Hyponatremia   - per Nephro        Case was reviewed with Dr. Baeza.      ERIC CastilloC       Detail Level: Detailed Depth Of Biopsy: dermis Was A Bandage Applied: Yes Size Of Lesion In Cm: 0 Biopsy Type: H and E Biopsy Method: Eugene flower Anesthesia Type: 1% lidocaine with epinephrine Anesthesia Volume In Cc: 0.5 Hemostasis: Drysol Wound Care: Petrolatum Dressing: bandage Destruction After The Procedure: No Type Of Destruction Used: Curettage Curettage Text: The wound bed was treated with curettage after the biopsy was performed. Cryotherapy Text: The wound bed was treated with cryotherapy after the biopsy was performed. Electrodesiccation Text: The wound bed was treated with electrodesiccation after the biopsy was performed. Electrodesiccation And Curettage Text: The wound bed was treated with electrodesiccation and curettage after the biopsy was performed. Silver Nitrate Text: The wound bed was treated with silver nitrate after the biopsy was performed. Lab: [id number] Lab Facility:  Consent: Written consent was obtained and risks were reviewed including but not limited to scarring, infection, bleeding, scabbing, incomplete removal, nerve damage and allergy to anesthesia. Post-Care Instructions: I reviewed with the patient in detail post-care instructions. Patient is to keep the biopsy site dry overnight, and then apply bacitracin twice daily until healed. Patient may apply hydrogen peroxide soaks to remove any crusting. Notification Instructions: Patient will be notified of biopsy results. However, patient instructed to call the office if not contacted within 2 weeks. Billing Type: Third-Party Bill Information: Selecting Yes will display possible errors in your note based on the variables you have selected. This validation is only offered as a suggestion for you. PLEASE NOTE THAT THE VALIDATION TEXT WILL BE REMOVED WHEN YOU FINALIZE YOUR NOTE. IF YOU WANT TO FAX A PRELIMINARY NOTE YOU WILL NEED TO TOGGLE THIS TO 'NO' IF YOU DO NOT WANT IT IN YOUR FAXED NOTE.

## 2024-01-12 NOTE — PROGRESS NOTES
Speech-Language Pathology Clinical Swallow Evaluation    Patient Name: Selvin Ochoa  MRN: [id number]  : 1970  Today's Date: 24  Start time: Start Time: 1454  Stop time: Stop Time: 1514  Time calculation (min) : Time Calculation (min): 20 min    PMHx relevant to rehab: HTN, HLD and EtOH use disorder.    Pt was admitted on 24 due to turning yellow as well as inability to keep solid foods down for ~3 days.    Relevant imaging results:  CT abdomen pelvis 24: 1. Mild gastric wall thickening.  Correlate clinically for gastritis.  2. Mildly dilated fluid-filled loops of small bowel centrally  measuring up to 3.5 cm without a sharp transition point. Findings  suggestive of a mild ileus.  If symptoms do not improve/resolve,  recommend repeat CT with oral contrast to assess transit of contrast  through the dilated small bowel loops.  3. Severe hepatic steatosis.  No other CT findings to account for the  patient's reported jaundice.  No biliary dilatation    CXR 24: No acute cardiopulmonary process      ASSESSMENT  Impressions:  Mild-moderate oral dysphagia and concern for pharyngeal dysphagia based on clinical swallow evaluation. Objective assessment via MBSS is required to determine aspiration risk, safest/least restrictive diet, and any effective compensatory strategies. MBSS will not be available over the weekend, therefore will plan for MBSS on 1/15/24 if approved by physician. Pt does appear safe for a modified diet at this time with use of safe swallow strategies. Supervision is recommended during PO intake to ensure use of safe swallow strategies.  Prognosis: Good      PLAN  Recommendations:  Solid consistency: Minced/moist (IDDSI level 5)  Liquid consistency: Thin (IDDSI 0)  Medication administration: Whole and in puree (crush large pills)  Compensatory swallow strategies: Upright positioning for all PO intake, Slow rate of intake, Small/single sips, and Supervision recommended during  meals    Recommended frequency/duration:  Skilled SLP services recommended: Yes  Frequency: 2x/week  Duration: 2 weeks  Discharge recommendation: Recommend MODERATE intensity ST upon DC in order to ensure safety with least restrictive diet.    Goals:  1. Pt will consume prescribed diet (current diet is minced/moist solids and thin liquids) without overt s/sx aspiration/penetration >95% of the time.  1. Pt will participate in MBSS to assess for safest/least restrictive diet and any effective compensatory strategies.  2. Further goals to be determined after MBSS.      SUBJECTIVE  SLP Received On: 01/12/24  Patient Class: Inpatient  Living Environment: Home  Ordering Physician: Dr. Izquierdo  Reason for Referral: Coughing with thin liquids  Prior to Session Communication: Bedside nurse  RN cleared pt to participate in session and reported that pt is more alert this date than yesterday.      [employer] Diet: Regular/thin  Current Diet : Regular/thin, cardiac; 1500mL fluid restriction    Pain Assessment  Pain Assessment: 0-10  Pain Score: 0 - No pain    Behavior/Cognition: Alert, Cooperative, Pleasant mood  Orientation: Oriented to self, Oriented to hospital but not name of facility, Oriented to month, Oriented to year, and Partially oriented to situation  Respiratory Status: Room air  Baseline Vocal Quality: Dysphonic, Wet  Volitional Cough: Strong  Volitional Swallow: Within Functional Limits  Patient positioning: Upright in bed      OBJECTIVE  Clinical swallow evaluation completed and consisted of interview, oral motor assessment, and PO trials (thin liquids x3-4oz via straw, pureed solids x4 bites, soft/bite-sized solids x3 bites).  Pt with incoordinated breathing pattern and mildly increased respiratory rate throughout session. Pt also demonstrating dysphonia and frequent throat clearing prior to, during, and after PO trials.  ORAL PHASE: Upper and lower dentures in place. Oral mucosa were pink, dry, and free of  obvious lesions. Lingual strength and ROM were impaired bilaterally. Labial seal was adequate. Mastication of soft solids appeared adequate vs mildly insufficient. A/P transit was incoordinated. No oral residuals were seen.  PHARYNGEAL PHASE: Laryngeal elevation was visualized and palpated during swallows, however adequacy of hyolaryngeal elevation/excursion cannot be determined at bedside. No immediate or delayed s/sx aspiration/penetration were observed with thin liquids or pureed solids, however this is difficult to accurately assess at bedside due to pt's frequent baseline throat clearing and baseline dysphonia. These factors as well as pt's incoordinated breathing pattern increase his risk for aspiration. Pt did demonstrate coughing prior to the swallow with x1/3 trials of soft/bite-sized solids, concerning for aspiration. 3oz challenge: pt drank 3oz of thin liquid in one attempt, without breaking, with no overt s/sx aspiration/penetration observed.      Treatment/Education:  Results and recommendations were relayed to: Patient, Bedside nurse, and Physician  Education provided: Yes   Learner: Patient   Barriers to learning: Acuteness of illness barrier and Cognitive limitations barrier   Method of teaching: Verbal and Demonstration   Topic: Role of ST, results of assessment, risk for aspiration, recommended diet modifications, recommendation for MBSS, recommended safe swallow strategies, and recommendation for dysphagia follow-up   Outcome of teaching: Pt verbalized understanding  Treatment provided: No  Next Treatment Priority: MBSS

## 2024-01-12 NOTE — PROGRESS NOTES
"Occupational Therapy    Evaluation    Patient Name: Selvin Ochoa  MRN: [id number]  Today's Date: 1/12/2024       Assessment  IP OT Assessment  OT Assessment: OT eval completed. The patient is functioning below baseline for ADLs and mobility. can benefit from continued OT  End of Session Communication: Bedside nurse  End of Session Patient Position: Bed, 4 rail up, Alarm on    Plan:  Treatment Interventions: ADL retraining, Functional transfer training, UE strengthening/ROM, Endurance training, Cognitive reorientation, Neuromuscular reeducation  OT Frequency: 3 times per week  OT Discharge Recommendations: Moderate intensity level of continued care  Equipment Recommended upon Discharge: Wheeled walker  OT - OK to Discharge: Yes To next level of care, with consideration of recommendations established on OT eval, and once cleared by medical team/physician for DC.     Subjective     Current Problem:  1. Hyponatremia        Admission for Fall at home.     General:  General  Reason for Referral: ADLs, safety assessment  Referred By: Reshma  Past Medical History Relevant to Rehab:   HTN, ETOH.  Co-Treatment: PT  Co-Treatment Reason: to optimize safety and mobility, while focusing on discipline specific goals  Prior to Session Communication: Bedside nurse  Patient Position Received: Bed, 4 rail up, Alarm on  General Comment: pt agreeable to therapy. groggy, lethargic. RN present and cleared pt for therapy. RN encouraging pt to participate.    Precautions:  Medical Precautions: Fall precautions        Pain:  Pain Assessment  Pain Assessment: 0-10  Pain Score: 0 - No pain    Objective     Cognition:  Overall Cognitive Status: Impaired  Arousal/Alertness: Delayed responses to stimuli  Orientation Level:  (oriented to self. pt asking RN \"where am I?\". Therapy and RN provided with reorientation to place, time of day. pt  was oriented to current month and year. not fully oriented to situation)  Processing Speed: Delayed         " "    Home Living:  Type of Home: House  Lives With: Alone (with pet cat)  Home Adaptive Equipment: None  Home Layout: One level  Home Access: Stairs to enter with rails  Entrance Stairs-Number of Steps: 3  Home Living Comments: pt voiced he spends a lot of time in his garage \"drinking beer\"     Prior Function:  Receives Help From: Friends, Family  ADL Assistance: Independent  Homemaking Assistance: Independent  Ambulatory Assistance: Independent    ADL:  Eating Assistance: Moderate (anticipated)  Grooming Assistance: Total  Grooming Deficit: Brushing hair  Bathing Assistance: Maximal (anticipated)  UE Dressing Assistance: Maximal  UE Dressing Deficit:  ([facility])  LE Dressing Assistance: Total (anticipated)  Toileting Assistance with Device: Total (anticipated)      Bed Mobility/Transfers:   Bed Mobility  Bed Mobility: Yes  Bed Mobility 1  Bed Mobility 1: Supine to sitting, Sitting to supine  Level of Assistance 1: Maximum assistance (x2)  Transfers  Transfer: Yes  Transfer 1  Transfer From 1: Sit to  Transfer to 1: Stand  Technique 1: Stand to sit  Transfer Device 1:  (hand held)  Transfer Level of Assistance 1: Moderate assistance, +2, Maximum tactile cues, Maximum verbal cues  Trials/Comments 1: pt stood at bedside and took steps towards HOB with mod A x2.      Sitting Balance:  Static Sitting Balance  Static Sitting-Balance Support: Bilateral upper extremity supported, Feet supported  Static Sitting-Level of Assistance: Close supervision, Contact guard  Dynamic Sitting Balance  Dynamic Sitting-Balance Support: Bilateral upper extremity supported, Feet supported  Dynamic Sitting-Balance:  (15 minutes CGA at EOB)        Strength:  Strength Comments: BUE ROM WFL. BUE strength grossly 4/5 to 4+/5    Outcome Measures: [facility] Daily Activity  Putting on and taking off regular lower body clothing: Total  Bathing (including washing, rinsing, drying): A lot  Putting on and taking off regular upper body " clothing: A lot  Toileting, which includes using toilet, bedpan or urinal: Total  Taking care of personal grooming such as brushing teeth: Total  Eating Meals: A lot  Daily Activity - Total Score: 9                    EDUCATION:     Education Documentation  ADL Training, taught by Rufina Domingo OT at 1/12/2024  3:48 PM.  Learner: Patient  Readiness: Acceptance  Method: Demonstration  Response: Needs Reinforcement    Education Comments  No comments found.        Goals:   Encounter Problems       Encounter Problems (Active)       ADLs       Patient will complete daily grooming tasks brushing teeth and washing face/hair with stand by assist level of assistance and PRN adaptive equipment while standing. (Progressing)       Start:  01/12/24    Expected End:  01/26/24            Patient will complete toileting including hygiene clothing management/hygiene with minimal assist  level of assistance and raised toilet seat and grab bars. (Progressing)       Start:  01/12/24    Expected End:  01/26/24               COGNITION/SAFETY       Patient will follow 90% Multistep commands to allow improved ADL performance. (Progressing)       Start:  01/12/24    Expected End:  01/26/24            Patient will demonstrated orientation x place, time, situation with minimal or less verbal cues. (Progressing)       Start:  01/12/24    Expected End:  01/26/24       ORIENTATION            TRANSFERS       Patient will complete functional transfers with least restrictive device with contact guard assist level of assistance. (Progressing)       Start:  01/12/24    Expected End:  01/26/24

## 2024-01-12 NOTE — PROGRESS NOTES
Critical Care Daily Progress        Subjective   Patient is a 53 y.o. male admitted on 1/9/2024  1:35 PM with the following indication(s) for ICU care: severe hyponatremia, acute encephalopathy, acute alcohol withdrawal.     Interval History: .   1/10/2024: Selvin Ochoa is a 53 y.o. male with a past medical history of HTN, HLD, and alcohol use disorder was admitted to the ICU because of severe hyponatremia, acute encephalopathy and alcohol withdrawal. On our examination the patient was not able to communicate and not oriented.      1/11/2024: Patient remains disoriented. He continues on the phenobarbital taper which is controlling his withdrawal symptoms well. Sodium was up to 119 this morning. Continue fluid restriction and started him on a cardiac diet to encourage oral intake.    1/12/2024: Patient was oriented x 4 on physical examination this morning however is not fully alert and appears lethargic from the phenobarbital. He has not been experiencing withdrawal symptoms. We will change from phenobarb taper to Librium and Ativan to allow the patient to be more alert and awake.       Scheduled Medications:   chlordiazePOXIDE, 25 mg, oral, q8h  folic acid, 1 mg, oral, Daily  [Held by provider] metoprolol succinate XL, 50 mg, oral, Daily  metoprolol, 5 mg, intravenous, q6h  multivitamin with minerals, 1 tablet, oral, Daily  pantoprazole, 40 mg, intravenous, Daily  [START ON 1/13/2024] thiamine, 100 mg, oral, Daily         Continuous Medications:         PRN Medications:   PRN medications: dextrose 10 % in water (D10W), dextrose, glucagon, LORazepam **OR** LORazepam **OR** LORazepam, ondansetron, oxygen    Objective   Vitals:  Most Recent:  Vitals:    01/12/24 1105   BP:    Pulse: 86   Resp: 25   Temp:    SpO2: 92%       24hr Min/Max:  Temp  Min: 37.2 °C (99 °F)  Max: 37.2 °C (99 °F)  Pulse  Min: 81  Max: 121  BP  Min: 86/72  Max: 134/76  Resp  Min: 9  Max: 51  SpO2  Min: 88 %  Max: 100 %    LDA:  Urethral Catheter  Coude 18 Fr. (Active)   Placement Date/Time: 01/10/24 0538   Hand Hygiene Completed: Yes  Catheter Type: Coude  Tube Size (Fr.): 18 Fr.  Catheter Balloon Size: 10 mL   Number of days: 1         Vent settings:       Hemodynamic parameters for last 24 hours:       I/O:    Intake/Output Summary (Last 24 hours) at 1/12/2024 1150  Last data filed at 1/12/2024 0500  Gross per 24 hour   Intake 1580 ml   Output 1425 ml   Net 155 ml         Physical Exam:   Physical Exam  Constitutional:       General: He is not in acute distress.     Comments: Takes him some time to become aroused and alert after prompting.   HENT:      Head: Normocephalic and atraumatic.   Eyes:      General: Scleral icterus present.   Cardiovascular:      Rate and Rhythm: Regular rhythm. Tachycardia present.      Pulses: Normal pulses.   Pulmonary:      Breath sounds: Normal breath sounds.   Abdominal:      General: There is distension.   Musculoskeletal:         General: No swelling.      Cervical back: Normal range of motion and neck supple.   Skin:     General: Skin is warm and dry.      Coloration: Skin is jaundiced.   Neurological:      Mental Status: He is oriented to person, place, and time. He is lethargic.   Psychiatric:         Behavior: Behavior is slowed. Behavior is cooperative.          Lab/Radiology/Diagnostic Review:  Results for orders placed or performed during the hospital encounter of 01/09/24 (from the past 24 hour(s))   Basic Metabolic Panel   Result Value Ref Range    Glucose 80 74 - 99 mg/dL    Sodium 118 (LL) 136 - 145 mmol/L    Potassium 4.6 3.5 - 5.3 mmol/L    Chloride 88 (L) 98 - 107 mmol/L    Bicarbonate 22 21 - 32 mmol/L    Anion Gap 13 10 - 20 mmol/L    Urea Nitrogen 15 6 - 23 mg/dL    Creatinine 1.20 0.50 - 1.30 mg/dL    eGFR 73 >60 mL/min/1.73m*2    Calcium 7.5 (L) 8.6 - 10.3 mg/dL   Ammonia   Result Value Ref Range    Ammonia 79 (H) 16 - 53 umol/L   Basic Metabolic Panel   Result Value Ref Range    Glucose 96 74 - 99  mg/dL    Sodium 122 (L) 136 - 145 mmol/L    Potassium 4.4 3.5 - 5.3 mmol/L    Chloride 92 (L) 98 - 107 mmol/L    Bicarbonate 25 21 - 32 mmol/L    Anion Gap 9 (L) 10 - 20 mmol/L    Urea Nitrogen 15 6 - 23 mg/dL    Creatinine 1.08 0.50 - 1.30 mg/dL    eGFR 83 >60 mL/min/1.73m*2    Calcium 7.2 (L) 8.6 - 10.3 mg/dL   Protime-INR   Result Value Ref Range    Protime 12.3 9.8 - 12.8 seconds    INR 1.1 0.9 - 1.1   Magnesium   Result Value Ref Range    Magnesium 2.03 1.60 - 2.40 mg/dL   Phosphorus   Result Value Ref Range    Phosphorus 3.6 2.5 - 4.9 mg/dL   Comprehensive metabolic panel   Result Value Ref Range    Glucose 86 74 - 99 mg/dL    Sodium 125 (L) 136 - 145 mmol/L    Potassium 4.5 3.5 - 5.3 mmol/L    Chloride 91 (L) 98 - 107 mmol/L    Bicarbonate 29 21 - 32 mmol/L    Anion Gap 10 10 - 20 mmol/L    Urea Nitrogen 14 6 - 23 mg/dL    Creatinine 1.11 0.50 - 1.30 mg/dL    eGFR 80 >60 mL/min/1.73m*2    Calcium 7.7 (L) 8.6 - 10.3 mg/dL    Albumin 2.4 (L) 3.4 - 5.0 g/dL    Alkaline Phosphatase 201 (H) 33 - 120 U/L    Total Protein 4.5 (L) 6.4 - 8.2 g/dL     (H) 9 - 39 U/L    Bilirubin, Total 16.3 (H) 0.0 - 1.2 mg/dL     (H) 10 - 52 U/L   CBC and Auto Differential   Result Value Ref Range    WBC 6.2 4.4 - 11.3 x10*3/uL    nRBC 3.7 (H) 0.0 - 0.0 /100 WBCs    RBC 3.18 (L) 4.50 - 5.90 x10*6/uL    Hemoglobin 10.4 (L) 13.5 - 17.5 g/dL    Hematocrit 29.7 (L) 41.0 - 52.0 %    MCV 93 80 - 100 fL    MCH 32.7 26.0 - 34.0 pg    MCHC 35.0 32.0 - 36.0 g/dL    RDW 15.9 (H) 11.5 - 14.5 %    Platelets 215 150 - 450 x10*3/uL    Neutrophils % 46.2 40.0 - 80.0 %    Immature Granulocytes %, Automated 6.6 (H) 0.0 - 0.9 %    Lymphocytes % 21.7 13.0 - 44.0 %    Monocytes % 21.0 2.0 - 10.0 %    Eosinophils % 2.4 0.0 - 6.0 %    Basophils % 2.1 0.0 - 2.0 %    Neutrophils Absolute 2.88 1.20 - 7.70 x10*3/uL    Immature Granulocytes Absolute, Automated 0.41 0.00 - 0.70 x10*3/uL    Lymphocytes Absolute 1.35 1.20 - 4.80 x10*3/uL    Monocytes  Absolute 1.31 (H) 0.10 - 1.00 x10*3/uL    Eosinophils Absolute 0.15 0.00 - 0.70 x10*3/uL    Basophils Absolute 0.13 (H) 0.00 - 0.10 x10*3/uL   Bilirubin, Direct   Result Value Ref Range    Bilirubin, Direct 9.2 (H) 0.0 - 0.3 mg/dL   Morphology   Result Value Ref Range    RBC Morphology See Below     Hypochromia Mild     Target Cells Few      ECG 12 lead    Result Date: 1/10/2024  Sinus rhythm Probable left atrial enlargement Nonspecific intraventricular conduction delay Borderline repolarization abnormality See ED provider note for full interpretation and clinical correlation Confirmed by Dutch Weston ([id number]) on 1/10/2024 2:07:13 PM    CT head wo IV contrast    Result Date: 1/10/2024  Interpreted By:  Dipesh Hallman, STUDY: CT HEAD WO IV CONTRAST;  1/10/2024 11:57 am   INDICATION: Signs/Symptoms:Encephalopathy, severe hyponatremia.   COMPARISON: Comparison study is from 03/06/2011..   ACCESSION NUMBER(S): [id number]   ORDERING CLINICIAN: ALO BELLE   TECHNIQUE: Routine axial images were obtained from the skull base through the vertex.  Sagittal and coronal reconstruction images were generated. Brain, subdural, and bone windows were reviewed.   FINDINGS: INTRACRANIAL: Mild prominence of ventricles and sulci. There is mild patchy hypodensity throughout the deep periventricular white matter. No acute intracranial bleed, midline shift, or focal mass effect. No destructive bone lesion. No depressed skull fracture.     EXTRACRANIAL: Moderate fluid in the right maxillary sinus. Small retention cyst in the left maxillary sinus. Ethmoid, frontal, and sphenoid sinuses were clear. Visualized mastoid air cells were clear.       No acute intracranial bleed or focal mass effect.   Mild volume loss.   Mild chronic white matter ischemic disease in the deep periventricular regions.   Acute right maxillary sinusitis. Small left maxillary sinus retention cyst.   MACRO: None   Signed by: Dipesh Hallman 1/10/2024 12:27 PM  Dictation workstation:   [id number]    US liver with doppler    Result Date: 1/10/2024  Interpreted By:  Dipesh Hallman, STUDY: US LIVER WITH DOPPLER  1/10/2024 11:54 am   INDICATION: 54 y/o   M with  Signs/Symptoms:liver disease; perform with liver doppler.   COMPARISON: Correlation with CT scan from 01/09/2024. Correlation with renal ultrasound from 03/06/2011.   ACCESSION NUMBER(S): [id number]   ORDERING CLINICIAN: ANABELLE MADERA   TECHNIQUE: Ultrasound of the abdomen was performed using grayscale imaging, color Doppler, and spectral Doppler.   The patient was in restraints and was combative. This did limit the exam somewhat.   FINDINGS: LIVER: Cranialcaudal length:  19.7cm, enlarged. Echogenicity:  Diffusely increased. Smooth liver margins. Mass: None.   GALLBLADDER: No shadowing stone, gallbladder wall thickening, adjacent edema, or sonographic Bergman sign.   BILE DUCTS: No intrahepatic biliary ductal dilatation. Common bile duct measured 4 mm in diameter. This is within the limits of normal.   PANCREAS: The pancreas was obscured by bowel gas..   PERITONEAL FLUID: No ascites.   SPLEEN: Measures  11.7cm in craniocaudal dimension, which is within normal limits.  No focal splenic lesion identified.   RIGHT KIDNEY: The right kidney measured  12.0 cm in length. It  was sonographically normal for size and echogenicity. There was no shadowing stone, hydronephrosis, or perinephric collection.   LEFT KIDNEY: The left  kidney measured  11.3 cm in length. It was sonographically normal for size and echogenicity. There was no shadowing stone, hydronephrosis, or perinephric collection. Upper pole parapelvic cyst measures 38 x 21 x 31 mm. This is grossly stable.   IVC AND ABDOMINAL AORTA: Not well seen.   HEPATIC ARTERIES: The following demonstrate patency and appropriate direction of flow: Main hepatic artery RI  0.82 which is mildly elevated; Right hepatic artery was not well seen; Left hepatic artery was not well  seen;   SPLENIC VEIN: Splenic vein was not well seen   HEPATIC AND PORTAL VENOUS BRANCHES: Right, middle and left hepatic veins were not well seen in this exam. Main portal vein measured  12 mm in diameter. Main portal vein flow was at 10.2   cm/sec. Direction of flow was grossly normal. The left portal vein was not well seen. Anterior branch and posterior branch of the right portal vein were reasonably well seen. Direction of flow was grossly normal. No recanalized periumbilical vein or splenorenal shunt identified.       The patient was in restraints and combative. This did result in a suboptimal exam. Because of this, the right and left hepatic arteries, splenic vein, middle, right, and left hepatic veins, and left portal vein were not well seen. Also, the pancreas was not well seen. Finally, the abdominal aorta and IVC were not well seen.   Blood flow in the main portal vein and the right portal veins was grossly normal in direction.   Main hepatic artery resistive index was mildly elevated. Significance of this is uncertain.   Hepatomegaly.  Nonspecific increased echogenicity throughout the liver, most likely fatty infiltration. Liver margins were smooth. No focal hepatic mass in this limited exam.   No splenomegaly. No ascites.   Grossly stable upper pole parapelvic left renal cyst.       MACRO: None   Signed by: Dipesh Hallman 1/10/2024 12:25 PM Dictation workstation:   [id number]    CT abdomen pelvis w IV contrast    Result Date: 1/9/2024  STUDY: CT Abdomen and Pelvis with IV Contrast; 1/9/2024 at 4:47 PM. INDICATION: Abdominal pain, jaundice. COMPARISON: None available. ACCESSION NUMBER(S): [id number] ORDERING CLINICIAN: NURY HERNANDEZ TECHNIQUE: CT of the abdomen and pelvis was performed.  Contiguous axial images were obtained at 3 mm slice thickness through the abdomen and pelvis. Coronal and sagittal reconstructions at 3 mm slice thickness were performed.  Omnipaque 350 100 mL was administered  intravenously.  FINDINGS: LOWER CHEST: No cardiomegaly.  No pericardial effusion.  Lung bases are clear.  ABDOMEN:  LIVER: No hepatomegaly.  Smooth surface contour.  Severe fatty infiltration of the liver.  BILE DUCTS: No intrahepatic or extrahepatic biliary ductal dilatation.  GALLBLADDER: The gallbladder is present without gallstones. STOMACH: Mild gastric wall thickening. PANCREAS: No masses or ductal dilatation.  SPLEEN: No splenomegaly or focal splenic lesion.  ADRENAL GLANDS: No thickening or nodules.  KIDNEYS AND URETERS: Kidneys are normal in size and location.  No renal or ureteral calculi.  Renal cysts measuring up to 3 cm on the left.  PELVIS:  BLADDER: No abnormalities identified.  REPRODUCTIVE ORGANS: No abnormalities identified.  BOWEL: Mildly dilated fluid-filled loops of small bowel centrally measuring up to 3.5 cm without a sharp transition point  VESSELS: No abnormalities identified.  Abdominal aorta is normal in caliber.  PERITONEUM/RETROPERITONEUM/LYMPH NODES: No free fluid.  No pneumoperitoneum. No lymphadenopathy.  ABDOMINAL WALL: No abnormalities identified. SOFT TISSUES: No abnormalities identified.  BONES: No acute fracture or aggressive osseous lesion.    1. Mild gastric wall thickening.  Correlate clinically for gastritis. 2. Mildly dilated fluid-filled loops of small bowel centrally measuring up to 3.5 cm without a sharp transition point. Findings suggestive of a mild ileus.  If symptoms do not improve/resolve, recommend repeat CT with oral contrast to assess transit of contrast through the dilated small bowel loops. 3. Severe hepatic steatosis.  No other CT findings to account for the patient's reported jaundice.  No biliary dilatation. Signed by Luis Bhardwaj MD                     Assessment/Plan   Principal Problem:    Hyponatremia    Selvin Ochoa is a 53 y.o. male with a past medical history of HTN, HLD, and alcohol use disorder who presented to the [facility] ED on 1/9/2024 for  progressive weakness of three days. He was admitted to the ICU for hyponatremia with a sodium of 113 on admission, as well as acute encephalopathy and alcohol withdrawal.     1. Hyponatremia likely secondary to beer potomania and poor oral intake  1/10/2024: Patient received 1L of NS in the ED.  Nephrology was consulted.  Most recent Na level was 115 at 8 am.   Continue to trend sodium every 4 hours.  Do not believe the patient has symptomatic hyponatremia as his altered mental status may be secondary to his acute alcohol withdrawal, therefore no need for hypertonic saline.  Serum and urine osmolality labs pending.  Goal correction rate is no more than 6 mEq/L in 24 hours.  Fluid restriction to 1.5L per day.  1/11/2024: Sodium improved to 119 this morning.  Check sodium every 6 hours.  Discontinued fluids for now since urine osmolality is high indicating a component of SIADH and the patient is retaining free water and has hypertonic urine.  Started cardiac diet to encourage oral intake.  Goal correction rate is no more than 6 mEq/L in 24 hours.   Okay to correct sodium to a maximum of 125 until 2pm today 1/11/2024 and up to 131 by 2pm on 1/12/2024.  Fluid restriction to 1.5L per day.  1/12/2024: Sodium improved to 125 this morning. Okay to correct up to 131 by 2pm today.   Fluid restriction 1.5L per day.     2. Acute metabolic encephalopathy likely secondary to alcohol withdrawal  1/10/2024: CT scan of the head revealed no acute pathology.  Suspect patient's altered mental status is due to him being in acute alcohol withdrawal.   He is at high risk for serious withdrawal and therefore has been placed on phenobarbital taper. 1/11/2024: Patient remains disoriented and is actively going through withdrawal.  Suspect mental status to improve over the next couple of days.  Continue phenobarbital taper.  Ordered ammonia level.  1/12/2024: Patient is oriented x 4 but is lethargic and takes a while to wake up and respond to  questioning likely due to the strength of the phenobarbital.  We will discontinue the phenobarbital taper and start Librium 25 mg q8 with parameters to hold for lethargy as well as Ativan prn per CIWA protocol.      3. Acute alcohol withdrawal and alcohol use disorder  1/10/2024: Continue CIWA Protocol and Phenobarbital taper.  Thiamine, folic acid, multivitamin.  Currently in restraints due to agitation.  1/12/2024: Patient having some tachycardia but no other symptoms of withdrawal.  We will discontinue the phenobarbital taper and start Librium 25 mg q8 with parameters to hold for lethargy as well as Ativan prn per CIWA protocol.      4. Alcoholic hepatitis  1/10/2024: CT scan indicated hepatic steatosis and GI was consulted.  Right upper quadrant ultrasound was ordered by GI and did not reveal any acute pathology.  No indication for prednisolone as MDF score is 18.7.  Full hepatic panel pending.  Encourage strict abstinence.  Social work consulted.     5.  Alcohol induced gastritis  1/10/2024: CT scan of abdomen showed evidence of gastritis  Continue IV Protonix  GI was consulted and is following    MEDHAT Almanzar IV

## 2024-01-12 NOTE — CARE PLAN
Problem: Mobility  Goal: STG - Patient will ambulate  Description: FWW MIN X2 75 FT  Outcome: Not Progressing  Goal: STRENGTHENING  Description: 20+ REPS EX INCREASING STRENGTH TO PROGRESS TO OOB ACTIVITIES  Outcome: Not Progressing     Problem: Transfers  Goal: STG - Transfer from bed to chair  Description: FWW MIN X1-2A  Outcome: Not Progressing  Goal: STG - Patient to transfer to and from sit to supine  Description: MIN A X1 USING RAILS HOB FLAT  Outcome: Not Progressing  Goal: STG - Patient will transfer sit to and from stand  Description: FWW MIN 1-2 A USING PROPER TECHNIQUE  Outcome: Not Progressing

## 2024-01-12 NOTE — PROGRESS NOTES
SW consult placed for ETOH, met with pt, introduced self and role as SW with care transitions. Explored pt's thoughts toward drinking behaviors, awareness, and willingness for tx. Pt stated he does want to quit drinking, has quit in the past by going cold turkey and plans to do so again. Pt declined any community resources at this time, was visibly drowsy and had trouble speaking during interaction, SW to follow up on Monday 1/15 if pt is still admitted.    CAROLE Baugh LSW ([id number])   Care Transitions     1/15: SW following up with pt, is still planning to quit cold turkey, does not want any resources to assist at this time. No other needs identified at this time, SW signing off,  pt and care team aware of SW availability while inpt.     CAROLE Baugh LSW ([id number])   Care Transitions     1/16 ([id number]): Contacted [facility], spoke with Fara, stated she spoke with family Wednesday and has not been in the office again until today. Aware Case management hoping to send pt to SNF on pending number, stated she will call to screen him today and then provide updates via e-mail. SW to follow as needed.    CAROLE Baugh LSW ([id number])   Care Transitions     1/18: Pt was approved for Medicaid and has Medicaid pending number, [facility] working on SNF placement for pt, No other needs identified at this time, SW signing off,  pt and care team aware of SW availability while inpt.     CAROLE Baugh LSW ([id number])   Care Transitions

## 2024-01-12 NOTE — PROGRESS NOTES
Selvin Ochoa is a 53 y.o. male on day 2 of admission presenting with Hyponatremia.    Subjective   Selvin Ochoa is a 53 y.o. male with a PMH of HTN, HLD and EtOH use disorder presenting with weakness.      He reports progressive weakness for the slat three days. The patient reports that he has not been able to keep any solid food down the past 3 days as well.  He has been able to maintain fluids.  The patient drinks 8-10 tall boys per day.  He states that he has cut down.  The patient has had seizure activity when he has tried to quit drinking.  The patient denies any fevers, chills, night cough, or diarrhea.  He reports normal urination.  He noted yesterday that his eyes and chest were turning yellow.  The patient denies any diagnosis of cirrhosis or liver dysfunction.  The patient reports that his last drink was last night but then changed and stated that his last drink was this morning.  He denies any contacts.  He is not having any chest pain, shortness of breath, dizziness, palpitations, paresthesias, focal weakness.  Denies any abdominal pain.       1/10: Patient is now in barb EtOH withdrawal. He required phenobarbital this AM.   1/11: Patient was seen and examined.  Continues to be in alcohol withdrawal.  Sleeping quietly when I saw after getting phenobarb this morning.  Requiring 2 L nasal cannula oxygen to maintain saturation.  Hyponatremia improving with sodium of 119 this morning.  Continue IV LR for now.  Nephrologist on board.  1/12:Patient was seen and examined. Still drowsy but arousable and confused. Saturating well on room air today. Hyponatremia continues to improve 125 today.    Bilirubin slightly elevated today with AST ALT trending down.  Discussed with patient's brother at bedside.  GI and nephrologist recommendations appreciated.  Continue to trend CMP daily.    Objective     Physical Exam  Constitutional:       General: He is in acute distress.      Appearance: He is ill-appearing and  "diaphoretic.   HENT:      Head: Normocephalic and atraumatic.      Mouth/Throat:      Mouth: Mucous membranes are dry.   Eyes:      Extraocular Movements: Extraocular movements intact.      Pupils: Pupils are equal, round, and reactive to light.   Cardiovascular:      Rate and Rhythm: Tachycardia present.      Heart sounds: No murmur heard.     No friction rub. No gallop.   Pulmonary:      Effort: Pulmonary effort is normal. No respiratory distress.      Breath sounds: Rales present. No wheezing or rhonchi.   Abdominal:      General: Abdomen is flat. There is no distension.      Palpations: Abdomen is soft.      Tenderness: There is no abdominal tenderness.   Musculoskeletal:         General: No swelling.   Skin:     General: Skin is warm.   Neurological:      Mental Status: He is disoriented.         Last Recorded Vitals  Blood pressure 114/70, pulse 86, temperature 35.9 °C (96.6 °F), resp. rate 25, height 1.854 m (6' 1\"), weight 111 kg (244 lb 11.4 oz), SpO2 92 %.  Intake/Output last 3 Shifts:  I/O last 3 completed shifts:  In: 3470 (31.3 mL/kg) [P.O.:1020; I.V.:2450 (22.1 mL/kg)]  Out: 2560 (23.1 mL/kg) [Urine:2560 (0.6 mL/kg/hr)]  Weight: 111 kg     Relevant Results                This patient has a urinary catheter   Reason for the urinary catheter remaining today? critically ill patient who need accurate urinary output measurements               Assessment/Plan   Hypovolemic Hyponatremia (likely beer potomania as well)   -Na 113 on admit   -IV fluid LR for now  -Trend Na Q4, goal correction of 6 to 8 mEq/24hrs  -Continue fluid restriction  -Intensivist and nephrology on board     Alcohol Use Disorder with Risk for withdrawal   -he is now experiencing complex withdrawal   -Continue phenobarbital taper in addition to Lorazepam via the [facility] protocol.     Alcoholic Hepatitis   -DF 20.4 on admit, no indication for steroids at this time   -Hepatitis panel negative  -trend CMP and INR   -additional labs per GI " pending   -Imaging with hepatic steatosis   -GI on board    Gastritis   -start PPI      Possible Ileus   -CLD for now   -Will consult surgery if he worsens       YOLANDA   -prerenal in the setting of above   -repleting volume      HTN  -holding home meds with normal BP      DVT ppx   -SCDs    I spent 35 minutes in the follow-up management of this patient    Tripp Justice MD

## 2024-01-12 NOTE — PROGRESS NOTES
"      Nephrology Progress Note      Nephrology following for hyponatremia.   Events over night: none  Serum sodium now 125 today. LR was discontinued on 1/11 at 1745.     Patient is just trying to take p.o. solid food today, had not tried yet when I stopped by to see him    /83   Pulse 100   Temp 37.2 °C (99 °F)   Resp 24   Ht 1.854 m (6' 1\")   Wt 111 kg (244 lb 11.4 oz)   SpO2 (!) 88%   BMI 32.29 kg/m²     Input / Output:  24 HR:   Intake/Output Summary (Last 24 hours) at 1/12/2024 0820  Last data filed at 1/12/2024 0500  Gross per 24 hour   Intake 1981.25 ml   Output 2050 ml   Net -68.75 ml         Physical Exam   Alert and oriented x 2 NAD  Neck: no JVD  CV: RRR  Lungs: CTA bilaterally  Abd: soft, NT, ND   Ext: trace lower extremity edema    Scheduled medications  folic acid, 1 mg, oral, Daily  [Held by provider] metoprolol succinate XL, 50 mg, oral, Daily  metoprolol, 5 mg, intravenous, q6h  multivitamin with minerals, 1 tablet, oral, Daily  pantoprazole, 40 mg, intravenous, Daily  PHENobarbital, 65 mg, intravenous, q8h   Followed by  PHENobarbital, 32.5 mg, intravenous, q8h  [START ON 1/13/2024] thiamine, 100 mg, oral, Daily  thiamine, 100 mg, intravenous, Daily      Continuous medications     PRN medications  PRN medications: dextrose 10 % in water (D10W), dextrose, glucagon, LORazepam **OR** LORazepam **OR** LORazepam, ondansetron, oxygen   Results from last 7 days   Lab Units 01/12/24  0531   SODIUM mmol/L 125*   POTASSIUM mmol/L 4.5   CHLORIDE mmol/L 91*   CO2 mmol/L 29   BUN mg/dL 14   CREATININE mg/dL 1.11   CALCIUM mg/dL 7.7*   PROTEIN TOTAL g/dL 4.5*   BILIRUBIN TOTAL mg/dL 16.3*   ALK PHOS U/L 201*   ALT U/L 145*   AST U/L 214*   GLUCOSE mg/dL 86        Results from last 7 days   Lab Units 01/12/24  0531 01/11/24  0624 01/10/24  0428   MAGNESIUM mg/dL 2.03 2.01 1.35*        Results from last 7 days   Lab Units 01/12/24  0531 01/11/24  0624 01/09/24  1349   WBC AUTO x10*3/uL 6.2 7.3 10.8 "   HEMOGLOBIN g/dL 10.4* 10.0* 12.5*   HEMATOCRIT % 29.7* 27.2* 33.3*   PLATELETS AUTO x10*3/uL 215 178 184          Assessment & Plan:     Patient is 53 y.o. male with PMHx of AUD, HTN and HLD presented to the ED on 1/9/24 with complaints of progressive weakness is admitted to hospital for alcoholic hepatitis and hyponatremia. Nephrology consulted in view of hyponatremia.     Hypotonic Hyponatremia  -Suspect chronic issue with baseline serum sodium in the high 120s  -Beer Potomania but he was having poor intake last week with nausea and vomiting  -Na was 113 on admission  -patient received a bolus of NS on 1/9  -started on  mL/hr which ran for approximately 4 hours  -given D5W for the following 7 hours, infusion stopped at 0100 on 1/10/24  -LR set at 75 mL/hr was started yesterday and serum sodium up to 119  -Level did start to drop again off LR now 118   -Sosm 241,  Uoxm 504 Mary < 10  -hypovolemic hyponatremia but this is also could be the acute liver injury playing a role  -Serum sodium corrected to 125 as of morning labs, no longer on IVF as patient is tolerating diet well     Hypomagnesemia   -1.35 on 1/10/24  -Now 2.03     Hypophosphatemia  -in the setting of alcohol use disorder and decreased food intake  -improved     Recommendations:   -q4H Na checks   -replete magnesium prn  -1500 mL fluid restriction    Please message me through EPIC chat with any questions or concerns.     Bernard Banegas  1/12/2024  8:20 AM     [facility]    [address], [address]   [place] [address]  Office: [phone]

## 2024-01-13 LAB
A1AT PHENOTYP SERPL-IMP: ABNORMAL
A1AT SERPL-MCNC: 211 MG/DL (ref 90–200)
ANION GAP SERPL CALC-SCNC: 9 MMOL/L (ref 10–20)
BUN SERPL-MCNC: 11 MG/DL (ref 6–23)
CALCIUM SERPL-MCNC: 7.2 MG/DL (ref 8.6–10.3)
CHLORIDE SERPL-SCNC: 96 MMOL/L (ref 98–107)
CO2 SERPL-SCNC: 31 MMOL/L (ref 21–32)
CREAT SERPL-MCNC: 0.96 MG/DL (ref 0.5–1.3)
EGFRCR SERPLBLD CKD-EPI 2021: >90 ML/MIN/1.73M*2
ERYTHROCYTE [DISTWIDTH] IN BLOOD BY AUTOMATED COUNT: 17 % (ref 11.5–14.5)
GLUCOSE BLD MANUAL STRIP-MCNC: 144 MG/DL (ref 74–99)
GLUCOSE SERPL-MCNC: 77 MG/DL (ref 74–99)
HCT VFR BLD AUTO: 29.9 % (ref 41–52)
HGB BLD-MCNC: 10.7 G/DL (ref 13.5–17.5)
LKM AB TITR SER IF: NORMAL {TITER}
MCH RBC QN AUTO: 33.5 PG (ref 26–34)
MCHC RBC AUTO-ENTMCNC: 35.8 G/DL (ref 32–36)
MCV RBC AUTO: 94 FL (ref 80–100)
NRBC BLD-RTO: 2.5 /100 WBCS (ref 0–0)
PLATELET # BLD AUTO: 219 X10*3/UL (ref 150–450)
POTASSIUM SERPL-SCNC: 3.9 MMOL/L (ref 3.5–5.3)
RBC # BLD AUTO: 3.19 X10*6/UL (ref 4.5–5.9)
SODIUM SERPL-SCNC: 132 MMOL/L (ref 136–145)
WBC # BLD AUTO: 7.7 X10*3/UL (ref 4.4–11.3)

## 2024-01-13 PROCEDURE — 85027 COMPLETE CBC AUTOMATED: CPT | Performed by: INTERNAL MEDICINE

## 2024-01-13 PROCEDURE — 99233 SBSQ HOSP IP/OBS HIGH 50: CPT | Performed by: INTERNAL MEDICINE

## 2024-01-13 PROCEDURE — 99232 SBSQ HOSP IP/OBS MODERATE 35: CPT | Performed by: INTERNAL MEDICINE

## 2024-01-13 PROCEDURE — 2500000001 HC RX 250 WO HCPCS SELF ADMINISTERED DRUGS (ALT 637 FOR MEDICARE OP): Performed by: INTERNAL MEDICINE

## 2024-01-13 PROCEDURE — 80048 BASIC METABOLIC PNL TOTAL CA: CPT | Performed by: INTERNAL MEDICINE

## 2024-01-13 PROCEDURE — 36415 COLL VENOUS BLD VENIPUNCTURE: CPT | Performed by: INTERNAL MEDICINE

## 2024-01-13 PROCEDURE — 82947 ASSAY GLUCOSE BLOOD QUANT: CPT

## 2024-01-13 PROCEDURE — 2500000001 HC RX 250 WO HCPCS SELF ADMINISTERED DRUGS (ALT 637 FOR MEDICARE OP)

## 2024-01-13 PROCEDURE — 2500000001 HC RX 250 WO HCPCS SELF ADMINISTERED DRUGS (ALT 637 FOR MEDICARE OP): Performed by: PHARMACIST

## 2024-01-13 PROCEDURE — C9113 INJ PANTOPRAZOLE SODIUM, VIA: HCPCS | Performed by: STUDENT IN AN ORGANIZED HEALTH CARE EDUCATION/TRAINING PROGRAM

## 2024-01-13 PROCEDURE — 2500000004 HC RX 250 GENERAL PHARMACY W/ HCPCS (ALT 636 FOR OP/ED): Performed by: STUDENT IN AN ORGANIZED HEALTH CARE EDUCATION/TRAINING PROGRAM

## 2024-01-13 PROCEDURE — 1100000001 HC PRIVATE ROOM DAILY

## 2024-01-13 PROCEDURE — 97530 THERAPEUTIC ACTIVITIES: CPT | Mod: GO,CO | Performed by: OCCUPATIONAL THERAPY ASSISTANT

## 2024-01-13 RX ORDER — LACTULOSE 10 G/15ML
20 SOLUTION ORAL 2 TIMES DAILY
Status: DISPENSED | OUTPATIENT
Start: 2024-01-13 | End: 2024-01-15

## 2024-01-13 RX ADMIN — FOLIC ACID 1 MG: 1 TABLET ORAL at 09:21

## 2024-01-13 RX ADMIN — PANTOPRAZOLE SODIUM 40 MG: 40 INJECTION, POWDER, FOR SOLUTION INTRAVENOUS at 09:21

## 2024-01-13 RX ADMIN — METOPROLOL SUCCINATE 50 MG: 50 TABLET, EXTENDED RELEASE ORAL at 09:21

## 2024-01-13 RX ADMIN — THIAMINE HCL TAB 100 MG 100 MG: 100 TAB at 18:22

## 2024-01-13 RX ADMIN — CHLORDIAZEPOXIDE HYDROCHLORIDE 25 MG: 25 CAPSULE ORAL at 18:30

## 2024-01-13 RX ADMIN — Medication 1 TABLET: at 09:21

## 2024-01-13 RX ADMIN — CHLORDIAZEPOXIDE HYDROCHLORIDE 25 MG: 25 CAPSULE ORAL at 05:06

## 2024-01-13 ASSESSMENT — COGNITIVE AND FUNCTIONAL STATUS - GENERAL
TOILETING: A LOT
TURNING FROM BACK TO SIDE WHILE IN FLAT BAD: A LITTLE
WALKING IN HOSPITAL ROOM: A LOT
PERSONAL GROOMING: A LITTLE
CLIMB 3 TO 5 STEPS WITH RAILING: A LOT
WALKING IN HOSPITAL ROOM: A LOT
DRESSING REGULAR UPPER BODY CLOTHING: A LITTLE
MOVING TO AND FROM BED TO CHAIR: A LOT
EATING MEALS: A LITTLE
HELP NEEDED FOR BATHING: A LOT
TOILETING: A LOT
HELP NEEDED FOR BATHING: A LOT
DAILY ACTIVITIY SCORE: 15
MOVING FROM LYING ON BACK TO SITTING ON SIDE OF FLAT BED WITH BEDRAILS: A LITTLE
STANDING UP FROM CHAIR USING ARMS: A LOT
MOVING TO AND FROM BED TO CHAIR: A LOT
DRESSING REGULAR UPPER BODY CLOTHING: A LITTLE
DRESSING REGULAR LOWER BODY CLOTHING: A LOT
TURNING FROM BACK TO SIDE WHILE IN FLAT BAD: A LITTLE
DAILY ACTIVITIY SCORE: 15
MOBILITY SCORE: 15
CLIMB 3 TO 5 STEPS WITH RAILING: A LOT
MOBILITY SCORE: 14
DRESSING REGULAR LOWER BODY CLOTHING: A LOT
STANDING UP FROM CHAIR USING ARMS: A LOT
PERSONAL GROOMING: A LITTLE
EATING MEALS: A LITTLE

## 2024-01-13 ASSESSMENT — PAIN - FUNCTIONAL ASSESSMENT
PAIN_FUNCTIONAL_ASSESSMENT: 0-10

## 2024-01-13 ASSESSMENT — PAIN SCALES - GENERAL
PAINLEVEL_OUTOF10: 0 - NO PAIN

## 2024-01-13 NOTE — PROGRESS NOTES
Occupational Therapy    OT Treatment    Patient Name: Selvin Ochoa  MRN: [id number]  Today's Date: 1/13/2024  Time Calculation  Start Time: 0711  Stop Time: 0734  Time Calculation (min): 23 min         Assessment:  OT Assessment: Pt is doing much better this date from previous session with pt able to complete bed mob with min A of 1 and standing/transfers/mob with mod A of 1. Pt does remain below baseline and would benefit from ongoing skilled OT tx to increase indep with ADLS and transfers to safely return to prior level of function.  End of Session Communication: Bedside nurse  End of Session Patient Position: Up in chair, Alarm off, caregiver present     Plan:  Treatment Interventions: ADL retraining, Functional transfer training, UE strengthening/ROM, Endurance training, Cognitive reorientation, Neuromuscular reeducation  OT Frequency: 3 times per week  OT Discharge Recommendations: Moderate intensity level of continued care  Equipment Recommended upon Discharge: Wheeled walker  OT - OK to Discharge: Yes  Treatment Interventions: ADL retraining, Functional transfer training, UE strengthening/ROM, Endurance training, Cognitive reorientation, Neuromuscular reeducation    Subjective   Previous Visit Info:  OT Last Visit  OT Received On: 01/13/24  General:  General  Family/Caregiver Present: No  Prior to Session Communication: Bedside nurse  Patient Position Received: Bed, 4 rail up, Alarm off, not on at start of session  General Comment: Pt supine in bed upon arrival and agreeable to OT tx.  Precautions:     Vital Signs:     Pain:  Pain Assessment  Pain Assessment: 0-10  Pain Score: 0 - No pain    Objective    Cognition:  Cognition  Overall Cognitive Status: Impaired  Orientation Level: Disoriented to time  Coordination:     Activities of Daily Living:   Functional Standing Tolerance:  Functional Standing Tolerance Comments: Pt shahla up to 4 mins of standing with FWW for transfers and fucntional tasks with hand held  assist with mod A to maintain bal with taking steps to chair. Pt takes short shuffling strides when ambulating to chair. Pt limited from decrease bal and endurance.  Bed Mobility/Transfers: Bed Mobility  Bed Mobility:  (Pt training for bed mob with HOB elevated and cues to use bed rails with assist provided for trunk and LLE, pt left seated in recliner at end of session)    Transfers  Transfer:  (Pt training for transfers with pushing up from bed and then hand held assist with mod A to complete)    Standing Balance:     Modalities:     IADL's:   Communication:     Splinting:     Casting:     Therapy/Activity:   Sensory:     Cognitive Skill Development:     Community/Work Reintegration Training:     Community Mobility:     Vision:     Strength:     Other Activity:               Outcome Measures:[facility] Daily Activity  Putting on and taking off regular lower body clothing: A lot  Bathing (including washing, rinsing, drying): A lot  Putting on and taking off regular upper body clothing: A little  Toileting, which includes using toilet, bedpan or urinal: A lot  Taking care of personal grooming such as brushing teeth: A little  Eating Meals: A little  Daily Activity - Total Score: 15        Education Documentation  ADL Training, taught by NADJA Zazueta at 1/13/2024  8:48 AM.  Learner: Patient  Readiness: Acceptance  Method: Explanation  Response: Verbalizes Understanding    Education Comments  No comments found.        OP EDUCATION:       Goals:  Encounter Problems       Encounter Problems (Active)       ADLs       Patient will complete daily grooming tasks brushing teeth and washing face/hair with stand by assist level of assistance and PRN adaptive equipment while standing. (Not Progressing)       Start:  01/12/24    Expected End:  01/26/24            Patient will complete toileting including hygiene clothing management/hygiene with minimal assist  level of assistance and raised toilet seat and grab bars. (Not  Progressing)       Start:  01/12/24    Expected End:  01/26/24               COGNITION/SAFETY       Patient will follow 90% Multistep commands to allow improved ADL performance. (Progressing)       Start:  01/12/24    Expected End:  01/26/24            Patient will demonstrated orientation x place, time, situation with minimal or less verbal cues. (Progressing)       Start:  01/12/24    Expected End:  01/26/24       ORIENTATION            Mobility       STG - Patient will ambulate (Not Progressing)       Start:  01/12/24    Expected End:  02/02/24       FWW MIN X2 75 FT         STRENGTHENING (Not Progressing)       Start:  01/12/24    Expected End:  02/02/24       20+ REPS EX INCREASING STRENGTH TO PROGRESS TO OOB ACTIVITIES            TRANSFERS       Patient will complete functional transfers with least restrictive device with contact guard assist level of assistance. (Progressing)       Start:  01/12/24    Expected End:  01/26/24               Transfers       STG - Transfer from bed to chair (Not Progressing)       Start:  01/12/24    Expected End:  01/26/24       FWW MIN X1-2A         STG - Patient to transfer to and from sit to supine (Not Progressing)       Start:  01/12/24    Expected End:  01/19/24       MIN A X1 USING RAILS HOB FLAT         STG - Patient will transfer sit to and from stand (Not Progressing)       Start:  01/12/24    Expected End:  01/22/24       FWW MIN 1-2 A USING PROPER TECHNIQUE

## 2024-01-13 NOTE — ED PROVIDER NOTES
HPI   Chief Complaint   Patient presents with    Delirium Tremens (DTS)       Chief complaint: Malaise    History of present illness: Patient is a 53-year-old male with history of hypertension hyperlipidemia and alcohol abuse presenting to the emergency department with complaints of weakness.  According to the patient, he has been unable to keep anything down for the past 3 days.  The patient drinks 8-10 beers a day.  The patient states that he feels unwell as a result, the patient was brought to the emergency department for further evaluation.  The patient denies any recent fever.        History provided by:  Patient   used: No                        Noah Coma Scale Score: 14                  Patient History   Past Medical History:   Diagnosis Date    Personal history of other diseases of the circulatory system     History of hypertension     Past Surgical History:   Procedure Laterality Date    OTHER SURGICAL HISTORY  11/10/2021    Appendectomy     Family History   Problem Relation Name Age of Onset    Heart failure Father       Social History     Tobacco Use    Smoking status: Former     Types: Cigarettes    Smokeless tobacco: Current     Types: Chew   Vaping Use    Vaping Use: Never used   Substance Use Topics    Alcohol use: Yes    Drug use: Never       Physical Exam   ED Triage Vitals   Temp Heart Rate Resp BP   01/09/24 1302 01/09/24 1302 01/09/24 1302 01/09/24 1302   36.5 °C (97.7 °F) 85 16 116/70      SpO2 Temp Source Heart Rate Source Patient Position   01/09/24 1302 01/11/24 0730 01/09/24 2045 01/09/24 2045   97 % Temporal Monitor Lying      BP Location FiO2 (%)     01/09/24 2045 01/10/24 1305     Left arm (!) 2 %       Physical Exam  Vitals and nursing note reviewed.   Constitutional:       General: He is not in acute distress.     Appearance: He is ill-appearing.   HENT:      Head: Normocephalic and atraumatic.   Eyes:      General: Scleral icterus present.       Conjunctiva/sclera: Conjunctivae normal.   Cardiovascular:      Rate and Rhythm: Normal rate and regular rhythm.      Heart sounds: No murmur heard.  Pulmonary:      Effort: Pulmonary effort is normal. No respiratory distress.      Breath sounds: Normal breath sounds.   Abdominal:      Palpations: Abdomen is soft.      Tenderness: There is no abdominal tenderness.   Musculoskeletal:         General: No swelling.      Cervical back: Neck supple.   Skin:     General: Skin is warm and dry.      Capillary Refill: Capillary refill takes less than 2 seconds.      Coloration: Skin is jaundiced.   Neurological:      Mental Status: He is alert.   Psychiatric:         Mood and Affect: Mood normal.         ED Course & MDM   Diagnoses as of 01/12/24 2321   Hyponatremia       Medical Decision Making  Medical decision making: Patient remained stable throughout his time in the emergency department.  CBC demonstrated a hemoglobin of 12.5 but no other significant abnormalities.  The patient's Chem-7 demonstrated a sodium of 115 and a creatinine of 1.31 LFTs were elevated with an alk phos of 203 and AST of 328 and ALT of 178 and a bilirubin of 15.5.  INR is 1.2 magnesium is 1.35 CAT scan of the patient's abdomen and pelvis with IV contrast demonstrated gastric wall thickening, possible ileus and hepatic steatosis without biliary dilatation.    Patient presents to the emergency department with complaints of malaise nausea and vomiting.  Workup was performed as above and demonstrated a profound hyponatremia.  In addition, the patient has what appears to be liver failure likely secondary to his drinking as a result, the patient will require admission to the hospital for further evaluation and therapy I spoke with the hospitalist who agreed the patient could be admitted to their service for further evaluation and therapy.  The patient was then admitted to the hospital in otherwise stable condition.    Amount and/or Complexity of Data  Reviewed  Labs: ordered. Decision-making details documented in ED Course.  Radiology: ordered. Decision-making details documented in ED Course.        Procedure  Critical Care    Performed by: Tobi Vasquez MD  Authorized by: Tobi Vasquez MD    Critical care provider statement:     Critical care time (minutes):  35    Critical care was necessary to treat or prevent imminent or life-threatening deterioration of the following conditions:  Hepatic failure and metabolic crisis    Critical care was time spent personally by me on the following activities:  Blood draw for specimens, discussions with consultants, examination of patient, ordering and performing treatments and interventions, ordering and review of laboratory studies, ordering and review of radiographic studies and re-evaluation of patient's condition    I assumed direction of critical care for this patient from another provider in my specialty: no      Care discussed with: admitting provider         Tobi Vasquez MD  01/12/24 5780

## 2024-01-13 NOTE — PROGRESS NOTES
Critical Care Daily Progress        Subjective   Patient is a 53 y.o. male admitted on 1/9/2024  1:35 PM with the following indication(s) for ICU care: severe hyponatremia, acute encephalopathy, acute alcohol withdrawal.     Interval History: .   1/10/2024: Selvin Ochoa is a 53 y.o. male with a past medical history of HTN, HLD, and alcohol use disorder was admitted to the ICU because of severe hyponatremia, acute encephalopathy and alcohol withdrawal. On our examination the patient was not able to communicate and not oriented.      1/11/2024: Patient remains disoriented. He continues on the phenobarbital taper which is controlling his withdrawal symptoms well. Sodium was up to 119 this morning. Continue fluid restriction and started him on a cardiac diet to encourage oral intake.    1/12/2024: Patient was oriented x 4 on physical examination this morning however is not fully alert and appears lethargic from the phenobarbital. He has not been experiencing withdrawal symptoms. We will change from phenobarb taper to Librium and Ativan to allow the patient to be more alert and awake.     1/13/2024: Patient sleeping comfortably, arouses when prompted. No acute changes overnight.       Scheduled Medications:   chlordiazePOXIDE, 25 mg, oral, q8h  folic acid, 1 mg, oral, Daily  metoprolol succinate XL, 50 mg, oral, Daily  multivitamin with minerals, 1 tablet, oral, Daily  pantoprazole, 40 mg, intravenous, Daily  thiamine, 100 mg, oral, Daily         Continuous Medications:         PRN Medications:   PRN medications: dextrose 10 % in water (D10W), dextrose, glucagon, LORazepam **OR** LORazepam **OR** LORazepam, ondansetron, oxygen    Objective   Vitals:  Most Recent:  Vitals:    01/13/24 0844   BP:    Pulse:    Resp:    Temp: 36.5 °C (97.7 °F)   SpO2:        24hr Min/Max:  Temp  Min: 35.9 °C (96.6 °F)  Max: 37 °C (98.6 °F)  Pulse  Min: 71  Max: 99  BP  Min: 102/68  Max: 123/88  Resp  Min: 10  Max: 28  SpO2  Min: 90 %  Max:  99 %    LDA:  Urethral Catheter Coude 18 Fr. (Active)   Placement Date/Time: 01/10/24 0538   Hand Hygiene Completed: Yes  Catheter Type: Coude  Tube Size (Fr.): 18 Fr.  Catheter Balloon Size: 10 mL   Number of days: 1         Vent settings:       Hemodynamic parameters for last 24 hours:       I/O:    Intake/Output Summary (Last 24 hours) at 1/13/2024 1102  Last data filed at 1/13/2024 0700  Gross per 24 hour   Intake --   Output 1250 ml   Net -1250 ml         Physical Exam:   Physical Exam  Constitutional:       General: He is sleeping. He is not in acute distress.     Comments: Takes him some time to become aroused and alert after prompting.   HENT:      Head: Normocephalic and atraumatic.   Eyes:      General: Scleral icterus present.   Cardiovascular:      Rate and Rhythm: Normal rate and regular rhythm.      Pulses: Normal pulses.   Pulmonary:      Breath sounds: Normal breath sounds.   Abdominal:      General: Abdomen is protuberant.      Palpations: Abdomen is soft.   Musculoskeletal:         General: No swelling.      Cervical back: Normal range of motion and neck supple.   Skin:     General: Skin is warm and dry.      Coloration: Skin is jaundiced.   Neurological:      Mental Status: He is oriented to person, place, and time. He is lethargic.   Psychiatric:         Behavior: Behavior is slowed.          Lab/Radiology/Diagnostic Review:  Results for orders placed or performed during the hospital encounter of 01/09/24 (from the past 24 hour(s))   Basic Metabolic Panel   Result Value Ref Range    Glucose 100 (H) 74 - 99 mg/dL    Sodium 127 (L) 136 - 145 mmol/L    Potassium 3.9 3.5 - 5.3 mmol/L    Chloride 93 (L) 98 - 107 mmol/L    Bicarbonate 29 21 - 32 mmol/L    Anion Gap 9 (L) 10 - 20 mmol/L    Urea Nitrogen 13 6 - 23 mg/dL    Creatinine 1.02 0.50 - 1.30 mg/dL    eGFR 88 >60 mL/min/1.73m*2    Calcium 7.6 (L) 8.6 - 10.3 mg/dL   CBC   Result Value Ref Range    WBC 7.7 4.4 - 11.3 x10*3/uL    nRBC 2.5 (H) 0.0 -  0.0 /100 WBCs    RBC 3.19 (L) 4.50 - 5.90 x10*6/uL    Hemoglobin 10.7 (L) 13.5 - 17.5 g/dL    Hematocrit 29.9 (L) 41.0 - 52.0 %    MCV 94 80 - 100 fL    MCH 33.5 26.0 - 34.0 pg    MCHC 35.8 32.0 - 36.0 g/dL    RDW 17.0 (H) 11.5 - 14.5 %    Platelets 219 150 - 450 x10*3/uL   Basic Metabolic Panel   Result Value Ref Range    Glucose 77 74 - 99 mg/dL    Sodium 132 (L) 136 - 145 mmol/L    Potassium 3.9 3.5 - 5.3 mmol/L    Chloride 96 (L) 98 - 107 mmol/L    Bicarbonate 31 21 - 32 mmol/L    Anion Gap 9 (L) 10 - 20 mmol/L    Urea Nitrogen 11 6 - 23 mg/dL    Creatinine 0.96 0.50 - 1.30 mg/dL    eGFR >90 >60 mL/min/1.73m*2    Calcium 7.2 (L) 8.6 - 10.3 mg/dL     ECG 12 lead    Result Date: 1/10/2024  Sinus rhythm Probable left atrial enlargement Nonspecific intraventricular conduction delay Borderline repolarization abnormality See ED provider note for full interpretation and clinical correlation Confirmed by Dutch Weston ([id number]) on 1/10/2024 2:07:13 PM    CT head wo IV contrast    Result Date: 1/10/2024  Interpreted By:  Dipesh Hallman, STUDY: CT HEAD WO IV CONTRAST;  1/10/2024 11:57 am   INDICATION: Signs/Symptoms:Encephalopathy, severe hyponatremia.   COMPARISON: Comparison study is from 03/06/2011..   ACCESSION NUMBER(S): [id number]   ORDERING CLINICIAN: ALO EBLLE   TECHNIQUE: Routine axial images were obtained from the skull base through the vertex.  Sagittal and coronal reconstruction images were generated. Brain, subdural, and bone windows were reviewed.   FINDINGS: INTRACRANIAL: Mild prominence of ventricles and sulci. There is mild patchy hypodensity throughout the deep periventricular white matter. No acute intracranial bleed, midline shift, or focal mass effect. No destructive bone lesion. No depressed skull fracture.     EXTRACRANIAL: Moderate fluid in the right maxillary sinus. Small retention cyst in the left maxillary sinus. Ethmoid, frontal, and sphenoid sinuses were clear. Visualized mastoid air  cells were clear.       No acute intracranial bleed or focal mass effect.   Mild volume loss.   Mild chronic white matter ischemic disease in the deep periventricular regions.   Acute right maxillary sinusitis. Small left maxillary sinus retention cyst.   MACRO: None   Signed by: Dipesh Hallman 1/10/2024 12:27 PM Dictation workstation:   [id number]    US liver with doppler    Result Date: 1/10/2024  Interpreted By:  Dipesh Hallman, STUDY: US LIVER WITH DOPPLER  1/10/2024 11:54 am   INDICATION: 52 y/o   M with  Signs/Symptoms:liver disease; perform with liver doppler.   COMPARISON: Correlation with CT scan from 01/09/2024. Correlation with renal ultrasound from 03/06/2011.   ACCESSION NUMBER(S): [id number]   ORDERING CLINICIAN: ANABELLE MADERA   TECHNIQUE: Ultrasound of the abdomen was performed using grayscale imaging, color Doppler, and spectral Doppler.   The patient was in restraints and was combative. This did limit the exam somewhat.   FINDINGS: LIVER: Cranialcaudal length:  19.7cm, enlarged. Echogenicity:  Diffusely increased. Smooth liver margins. Mass: None.   GALLBLADDER: No shadowing stone, gallbladder wall thickening, adjacent edema, or sonographic Bergman sign.   BILE DUCTS: No intrahepatic biliary ductal dilatation. Common bile duct measured 4 mm in diameter. This is within the limits of normal.   PANCREAS: The pancreas was obscured by bowel gas..   PERITONEAL FLUID: No ascites.   SPLEEN: Measures  11.7cm in craniocaudal dimension, which is within normal limits.  No focal splenic lesion identified.   RIGHT KIDNEY: The right kidney measured  12.0 cm in length. It  was sonographically normal for size and echogenicity. There was no shadowing stone, hydronephrosis, or perinephric collection.   LEFT KIDNEY: The left  kidney measured  11.3 cm in length. It was sonographically normal for size and echogenicity. There was no shadowing stone, hydronephrosis, or perinephric collection. Upper pole parapelvic cyst  measures 38 x 21 x 31 mm. This is grossly stable.   IVC AND ABDOMINAL AORTA: Not well seen.   HEPATIC ARTERIES: The following demonstrate patency and appropriate direction of flow: Main hepatic artery RI  0.82 which is mildly elevated; Right hepatic artery was not well seen; Left hepatic artery was not well seen;   SPLENIC VEIN: Splenic vein was not well seen   HEPATIC AND PORTAL VENOUS BRANCHES: Right, middle and left hepatic veins were not well seen in this exam. Main portal vein measured  12 mm in diameter. Main portal vein flow was at 10.2   cm/sec. Direction of flow was grossly normal. The left portal vein was not well seen. Anterior branch and posterior branch of the right portal vein were reasonably well seen. Direction of flow was grossly normal. No recanalized periumbilical vein or splenorenal shunt identified.       The patient was in restraints and combative. This did result in a suboptimal exam. Because of this, the right and left hepatic arteries, splenic vein, middle, right, and left hepatic veins, and left portal vein were not well seen. Also, the pancreas was not well seen. Finally, the abdominal aorta and IVC were not well seen.   Blood flow in the main portal vein and the right portal veins was grossly normal in direction.   Main hepatic artery resistive index was mildly elevated. Significance of this is uncertain.   Hepatomegaly.  Nonspecific increased echogenicity throughout the liver, most likely fatty infiltration. Liver margins were smooth. No focal hepatic mass in this limited exam.   No splenomegaly. No ascites.   Grossly stable upper pole parapelvic left renal cyst.       MACRO: None   Signed by: Dipesh Hallman 1/10/2024 12:25 PM Dictation workstation:   [id number]    CT abdomen pelvis w IV contrast    Result Date: 1/9/2024  STUDY: CT Abdomen and Pelvis with IV Contrast; 1/9/2024 at 4:47 PM. INDICATION: Abdominal pain, jaundice. COMPARISON: None available. ACCESSION NUMBER(S): [id number]  ORDERING CLINICIAN: NURY HERNANDEZ TECHNIQUE: CT of the abdomen and pelvis was performed.  Contiguous axial images were obtained at 3 mm slice thickness through the abdomen and pelvis. Coronal and sagittal reconstructions at 3 mm slice thickness were performed.  Omnipaque 350 100 mL was administered intravenously.  FINDINGS: LOWER CHEST: No cardiomegaly.  No pericardial effusion.  Lung bases are clear.  ABDOMEN:  LIVER: No hepatomegaly.  Smooth surface contour.  Severe fatty infiltration of the liver.  BILE DUCTS: No intrahepatic or extrahepatic biliary ductal dilatation.  GALLBLADDER: The gallbladder is present without gallstones. STOMACH: Mild gastric wall thickening. PANCREAS: No masses or ductal dilatation.  SPLEEN: No splenomegaly or focal splenic lesion.  ADRENAL GLANDS: No thickening or nodules.  KIDNEYS AND URETERS: Kidneys are normal in size and location.  No renal or ureteral calculi.  Renal cysts measuring up to 3 cm on the left.  PELVIS:  BLADDER: No abnormalities identified.  REPRODUCTIVE ORGANS: No abnormalities identified.  BOWEL: Mildly dilated fluid-filled loops of small bowel centrally measuring up to 3.5 cm without a sharp transition point  VESSELS: No abnormalities identified.  Abdominal aorta is normal in caliber.  PERITONEUM/RETROPERITONEUM/LYMPH NODES: No free fluid.  No pneumoperitoneum. No lymphadenopathy.  ABDOMINAL WALL: No abnormalities identified. SOFT TISSUES: No abnormalities identified.  BONES: No acute fracture or aggressive osseous lesion.    1. Mild gastric wall thickening.  Correlate clinically for gastritis. 2. Mildly dilated fluid-filled loops of small bowel centrally measuring up to 3.5 cm without a sharp transition point. Findings suggestive of a mild ileus.  If symptoms do not improve/resolve, recommend repeat CT with oral contrast to assess transit of contrast through the dilated small bowel loops. 3. Severe hepatic steatosis.  No other CT findings to account for the  patient's reported jaundice.  No biliary dilatation. Signed by Luis Bhardwaj MD                     Assessment/Plan   Principal Problem:    Hyponatremia    Selvin Ochoa is a 53 y.o. male with a past medical history of HTN, HLD, and alcohol use disorder who presented to the [facility] ED on 1/9/2024 for progressive weakness of three days. He was admitted to the ICU for hyponatremia with a sodium of 113 on admission, as well as acute encephalopathy and alcohol withdrawal.     1. Hyponatremia likely secondary to beer potomania and poor oral intake  1/10/2024: Patient received 1L of NS in the ED.  Nephrology was consulted.  Most recent Na level was 115 at 8 am.   Continue to trend sodium every 4 hours.  Do not believe the patient has symptomatic hyponatremia as his altered mental status may be secondary to his acute alcohol withdrawal, therefore no need for hypertonic saline.  Serum and urine osmolality labs pending.  Goal correction rate is no more than 6 mEq/L in 24 hours.  Fluid restriction to 1.5L per day.  1/11/2024: Sodium improved to 119 this morning.  Check sodium every 6 hours.  Discontinued fluids for now since urine osmolality is high indicating a component of SIADH and the patient is retaining free water and has hypertonic urine.  Started cardiac diet to encourage oral intake.  Goal correction rate is no more than 6 mEq/L in 24 hours.   Okay to correct sodium to a maximum of 125 until 2pm today 1/11/2024 and up to 131 by 2pm on 1/12/2024.  Fluid restriction to 1.5L per day.  1/12/2024: Sodium improved to 125 this morning. Okay to correct up to 131 by 2pm today.   Fluid restriction 1.5L per day.   1/13/2024: Sodium up to 132 this morning. Patient stable, continue to encourage patient to eat and maintain fluid restriction. Patient is has been step down status since yesterday, we will sign off.     2. Acute metabolic encephalopathy likely secondary to alcohol withdrawal  1/10/2024: CT scan of the head  revealed no acute pathology.  Suspect patient's altered mental status is due to him being in acute alcohol withdrawal.   He is at high risk for serious withdrawal and therefore has been placed on phenobarbital taper. 1/11/2024: Patient remains disoriented and is actively going through withdrawal.  Suspect mental status to improve over the next couple of days.  Continue phenobarbital taper.  Ordered ammonia level.  1/12/2024: Patient is oriented x 4 but is lethargic and takes a while to wake up and respond to questioning likely due to the strength of the phenobarbital.  We will discontinue the phenobarbital taper and start Librium 25 mg q8 with parameters to hold for lethargy as well as Ativan prn per CIWA protocol.   1/13/2024: Continue scheduled Librium and as needed Ativan per CIWA protocol, hold for lethargy.     3. Acute alcohol withdrawal and alcohol use disorder  1/10/2024: Continue CIWA Protocol and Phenobarbital taper.  Thiamine, folic acid, multivitamin.  Currently in restraints due to agitation.  1/12/2024: Patient having some tachycardia but no other symptoms of withdrawal.  We will discontinue the phenobarbital taper and start Librium 25 mg q8 with parameters to hold for lethargy as well as Ativan prn per CIWA protocol.   1/13/2024: Continue scheduled Librium and as needed Ativan per CIWA protocol, hold for lethargy.     4. Alcoholic hepatitis  1/10/2024: CT scan indicated hepatic steatosis and GI was consulted.  Right upper quadrant ultrasound was ordered by GI and did not reveal any acute pathology.  No indication for prednisolone as MDF score is 18.7.  Full hepatic panel pending.  Encourage strict abstinence.  Social work consulted.     5.  Alcohol induced gastritis  1/10/2024: CT scan of abdomen showed evidence of gastritis  Continue IV Protonix  GI was consulted and is following    MEDHAT Almanzar IV    Patient is step down status since yesterday, we will sign off. Contact us if there are any  questions.

## 2024-01-13 NOTE — PROGRESS NOTES
Selvin Ochoa is a 53 y.o. male on day 3 of admission presenting with Hyponatremia.    Subjective   Selvin Ochoa is a 53 y.o. male with a PMH of HTN, HLD and EtOH use disorder presenting with weakness.      He reports progressive weakness for the slat three days. The patient reports that he has not been able to keep any solid food down the past 3 days as well.  He has been able to maintain fluids.  The patient drinks 8-10 tall boys per day.  He states that he has cut down.  The patient has had seizure activity when he has tried to quit drinking.  The patient denies any fevers, chills, night cough, or diarrhea.  He reports normal urination.  He noted yesterday that his eyes and chest were turning yellow.  The patient denies any diagnosis of cirrhosis or liver dysfunction.  The patient reports that his last drink was last night but then changed and stated that his last drink was this morning.  He denies any contacts.  He is not having any chest pain, shortness of breath, dizziness, palpitations, paresthesias, focal weakness.  Denies any abdominal pain.       1/10: Patient is now in barb EtOH withdrawal. He required phenobarbital this AM.   1/11: Patient was seen and examined.  Continues to be in alcohol withdrawal.  Sleeping quietly when I saw after getting phenobarb this morning.  Requiring 2 L nasal cannula oxygen to maintain saturation.  Hyponatremia improving with sodium of 119 this morning.  Continue IV LR for now.  Nephrologist on board.  1/12:Patient was seen and examined. Still drowsy but arousable and confused. Saturating well on room air today. Hyponatremia continues to improve 125 today.    Bilirubin slightly elevated today with AST ALT trending down.  Discussed with patient's brother at bedside.  GI and nephrologist recommendations appreciated.  Continue to trend CMP daily.  1/13: Patient was seen and examined.  More awake and interactive today.  Sodium is improved to 132 today.  Bilirubin still elevated  "but AST ALT trending down.  Trend CMP daily.  Potential discharge extended-care facility within 48 to 72 hours.  Objective     Physical Exam  Constitutional:       General: He is in acute distress.      Appearance: He is ill-appearing and diaphoretic.   HENT:      Head: Normocephalic and atraumatic.      Mouth/Throat:      Mouth: Mucous membranes are dry.   Eyes:      Extraocular Movements: Extraocular movements intact.      Pupils: Pupils are equal, round, and reactive to light.   Cardiovascular:      Rate and Rhythm: Tachycardia present.      Heart sounds: No murmur heard.     No friction rub. No gallop.   Pulmonary:      Effort: Pulmonary effort is normal. No respiratory distress.      Breath sounds: Rales present. No wheezing or rhonchi.   Abdominal:      General: Abdomen is flat. There is no distension.      Palpations: Abdomen is soft.      Tenderness: There is no abdominal tenderness.   Musculoskeletal:         General: No swelling.   Skin:     General: Skin is warm.   Neurological:      Mental Status: He is disoriented.         Last Recorded Vitals  Blood pressure 107/73, pulse 74, temperature 36.6 °C (97.9 °F), temperature source Tympanic, resp. rate 21, height 1.854 m (6' 1\"), weight 109 kg (240 lb 4.8 oz), SpO2 99 %.  Intake/Output last 3 Shifts:  I/O last 3 completed shifts:  In: 1480 (13.6 mL/kg) [P.O.:480; I.V.:1000 (9.2 mL/kg)]  Out: 2150 (19.7 mL/kg) [Urine:2150 (0.5 mL/kg/hr)]  Weight: 109 kg     Relevant Results                This patient has a urinary catheter   Reason for the urinary catheter remaining today? critically ill patient who need accurate urinary output measurements               Assessment/Plan   Hypovolemic Hyponatremia (likely beer potomania as well)   -Na 113 on admit   -IV fluid LR for now  -Trend Na Q4, goal correction of 6 to 8 mEq/24hrs  -Continue fluid restriction  -Intensivist and nephrology on board     Alcohol Use Disorder with Risk for withdrawal   -he is now experiencing " complex withdrawal   -Continue phenobarbital taper in addition to   -Continue lorazepam via the CIWA protocol.     Alcoholic Hepatitis   -DF 22.4 on admit, no indication for steroids at this time   -Hepatitis panel negative  -trend CMP and INR   -additional labs per GI pending   -Imaging with hepatic steatosis   -GI on board    Gastritis   -start PPI      Possible Ileus   -CLD for now   -Will consult surgery if he worsens       YOLANDA   -prerenal in the setting of above   -repleting volume      HTN  -holding home meds with normal BP      DVT ppx   -SCDs    I spent 30 minutes in the follow-up management of this patient    Tripp Justice MD

## 2024-01-13 NOTE — PROGRESS NOTES
"[facility] Gastroenterology Progress Note    ASSESSMENT and PLAN:       Selvin Ochoa is a 53 y.o. male with a significant past medical history of hypertension, hyperlipidemia and EtOH abuse who presented with weakness. GI was consulted for \" alcohol hepatitis\".        Etoh Hepatitis   MDF  22.3 indicating good prognosis with no current indication for prednisolone. Acute hepatitis panel negative. Additional liver labs pending at this time. RUQ US showed hepatomegaly and a fatty liver.   - Daily LFTs and INR  - Supportive care      2. EtOH abuse  Actively drinking prior to admissionrStrict abstinence is needed.  - agree with monitoring for EtOH withdrawal and medicating based on CIWA scale  - continue with thiamine and folate  - abstinence is essential and social work should be involved to provide resources for quitting including AA     3. Hyponatremia   - per Raisa Baeza DO   Gastroenterology     Subjective        Overnight   Patient alert and oriented this a.m. he is tolerating oral intake.  He has no complaints this a.m.  Recognizes provider from the previous day          PAST HISTORIES:       Past Medical History:  He has a past medical history of Personal history of other diseases of the circulatory system.    Past Surgical History:  He has a past surgical history that includes Other surgical history (11/10/2021).      Social History:  He reports that he has quit smoking. His smoking use included cigarettes. His smokeless tobacco use includes chew. He reports current alcohol use. He reports that he does not use drugs.    Family History:  No known GI disease, specifically denies pancreatitis, Crohn's, colon cancer, gastroesophageal cancer, or ulcerative colitis.    Family History   Problem Relation Name Age of Onset    Heart failure Father          Allergies:  Patient has no known allergies.      OBJECTIVE:       Last Recorded Vitals:  Vitals:    01/13/24 0600 01/13/24 0700 01/13/24 0727 " "01/13/24 0844   BP: 123/88      Pulse: 75 75     Resp: 10 (!) 28     Temp:    36.5 °C (97.7 °F)   TempSrc:       SpO2: 96% 95% 99%    Weight: 109 kg (240 lb 4.8 oz)      Height:         /88   Pulse 75   Temp 36.5 °C (97.7 °F)   Resp (!) 28   Ht 1.854 m (6' 1\")   Wt 109 kg (240 lb 4.8 oz)   SpO2 99%   BMI 31.70 kg/m²      Physical Exam:    Physical Exam  Constitutional:       Appearance: Normal appearance.   HENT:      Mouth/Throat:      Mouth: Mucous membranes are moist.   Eyes:      Extraocular Movements: Extraocular movements intact.   Cardiovascular:      Rate and Rhythm: Normal rate and regular rhythm.      Heart sounds: Normal heart sounds.   Pulmonary:      Effort: Pulmonary effort is normal. No respiratory distress.      Breath sounds: Normal breath sounds. No wheezing.   Abdominal:      General: Abdomen is flat. Bowel sounds are normal. There is no distension.      Palpations: Abdomen is soft.      Tenderness: There is no abdominal tenderness. There is no rebound.   Musculoskeletal:         General: Normal range of motion.   Skin:     General: Skin is warm and dry.   Neurological:      General: No focal deficit present.      Mental Status: He is alert. Mental status is at baseline. He is disoriented.   Psychiatric:         Mood and Affect: Mood normal.         Behavior: Behavior normal.             Inpatient Medications:  chlordiazePOXIDE, 25 mg, oral, q8h  folic acid, 1 mg, oral, Daily  metoprolol succinate XL, 50 mg, oral, Daily  multivitamin with minerals, 1 tablet, oral, Daily  pantoprazole, 40 mg, intravenous, Daily  thiamine, 100 mg, oral, Daily      PRN medications: dextrose 10 % in water (D10W), dextrose, glucagon, LORazepam **OR** LORazepam **OR** LORazepam, ondansetron, oxygen    Outpatient Medications:  Prior to Admission medications    Medication Sig Start Date End Date Taking? Authorizing Provider   amLODIPine (Norvasc) 5 mg tablet TAKE ONE TABLET BY MOUTH ONCE DAILY 12/23/23   " Pankaj Rios MD   atorvastatin (Lipitor) 10 mg tablet Take 1 tablet (10 mg) by mouth once daily. 6/15/23   Pankaj Rios MD   lisinopril 40 mg tablet TAKE ONE TABLET BY MOUTH EVERY DAY 12/26/23   Pankaj Rios MD   metoprolol succinate XL (Toprol-XL) 50 mg 24 hr tablet TAKE ONE TABLET BY MOUTH ONCE DAILY. DO NOT CRUSH OR CHEW. 12/26/23   Pankaj Rios MD       LABS AND IMAGING:     Labs:  Recent labs reviewed in the EMR.    Results for orders placed or performed during the hospital encounter of 01/09/24 (from the past 96 hour(s))   CBC and Auto Differential   Result Value Ref Range    WBC 10.8 4.4 - 11.3 x10*3/uL    nRBC 0.3 (H) 0.0 - 0.0 /100 WBCs    RBC 3.70 (L) 4.50 - 5.90 x10*6/uL    Hemoglobin 12.5 (L) 13.5 - 17.5 g/dL    Hematocrit 33.3 (L) 41.0 - 52.0 %    MCV 90 80 - 100 fL    MCH 33.8 26.0 - 34.0 pg    MCHC 37.5 (H) 32.0 - 36.0 g/dL    RDW 15.0 (H) 11.5 - 14.5 %    Platelets 184 150 - 450 x10*3/uL    Immature Granulocytes %, Automated 0.6 0.0 - 0.9 %    Immature Granulocytes Absolute, Automated 0.06 0.00 - 0.70 x10*3/uL   Comprehensive metabolic panel   Result Value Ref Range    Glucose 105 (H) 74 - 99 mg/dL    Sodium 113 (LL) 136 - 145 mmol/L    Potassium 4.7 3.5 - 5.3 mmol/L    Chloride 80 (L) 98 - 107 mmol/L    Bicarbonate 23 21 - 32 mmol/L    Anion Gap 15 10 - 20 mmol/L    Urea Nitrogen 11 6 - 23 mg/dL    Creatinine 1.31 (H) 0.50 - 1.30 mg/dL    eGFR 65 >60 mL/min/1.73m*2    Calcium 8.0 (L) 8.6 - 10.3 mg/dL    Albumin 2.8 (L) 3.4 - 5.0 g/dL    Alkaline Phosphatase 227 (H) 33 - 120 U/L    Total Protein 5.5 (L) 6.4 - 8.2 g/dL     (H) 9 - 39 U/L    Bilirubin, Total 14.3 (H) 0.0 - 1.2 mg/dL     (H) 10 - 52 U/L   Lactate   Result Value Ref Range    Lactate 1.5 0.4 - 2.0 mmol/L   Lipase   Result Value Ref Range    Lipase 24 9 - 82 U/L   Protime-INR   Result Value Ref Range    Protime 13.4 (H) 9.8 - 12.8 seconds    INR 1.2 (H) 0.9 - 1.1   Manual Differential   Result Value Ref Range     Neutrophils %, Manual 88.0 40.0 - 80.0 %    Bands %, Manual 1.0 0.0 - 5.0 %    Lymphocytes %, Manual 3.0 13.0 - 44.0 %    Monocytes %, Manual 8.0 2.0 - 10.0 %    Eosinophils %, Manual 0.0 0.0 - 6.0 %    Basophils %, Manual 0.0 0.0 - 2.0 %    Seg Neutrophils Absolute, Manual 9.50 (H) 1.20 - 7.00 x10*3/uL    Bands Absolute, Manual 0.11 0.00 - 0.70 x10*3/uL    Lymphocytes Absolute, Manual 0.32 (L) 1.20 - 4.80 x10*3/uL    Monocytes Absolute, Manual 0.86 0.10 - 1.00 x10*3/uL    Eosinophils Absolute, Manual 0.00 0.00 - 0.70 x10*3/uL    Basophils Absolute, Manual 0.00 0.00 - 0.10 x10*3/uL    Total Cells Counted 100     Neutrophils Absolute, Manual 9.61 (H) 1.20 - 7.70 x10*3/uL    RBC Morphology See Below     Hypochromia Mild     Target Cells Many    Lavender Top   Result Value Ref Range    Extra Tube Hold for add-ons.    PST Top   Result Value Ref Range    Extra Tube Hold for add-ons.    Phosphorus   Result Value Ref Range    Phosphorus 2.3 (L) 2.5 - 4.9 mg/dL   ECG 12 lead   Result Value Ref Range    Ventricular Rate 65 BPM    Atrial Rate 65 BPM    AZ Interval 188 ms    QRS Duration 127 ms    QT Interval 404 ms    QTC Calculation(Bazett) 421 ms    P Axis 64 degrees    R Axis 12 degrees    T Axis -29 degrees    QRS Count 11 beats    Q Onset 252 ms    T Offset 454 ms    QTC Fredericia 414 ms   Urinalysis with Reflex Culture and Microscopic   Result Value Ref Range    Color, Urine Zehra (N) Straw, Yellow    Appearance, Urine Clear Clear    Specific Gravity, Urine 1.060 (N) 1.005 - 1.035    pH, Urine 6.0 5.0, 5.5, 6.0, 6.5, 7.0, 7.5, 8.0    Protein, Urine 30 (1+) (N) NEGATIVE mg/dL    Glucose, Urine NEGATIVE NEGATIVE mg/dL    Blood, Urine NEGATIVE NEGATIVE    Ketones, Urine 5 (TRACE) (A) NEGATIVE mg/dL    Bilirubin, Urine MODERATE (2+) (A) NEGATIVE    Urobilinogen, Urine 4.0 (N) <2.0 mg/dL    Nitrite, Urine NEGATIVE NEGATIVE    Leukocyte Esterase, Urine NEGATIVE NEGATIVE   Extra Urine Gray Tube   Result Value Ref Range     Extra Tube Hold for add-ons.    Sodium   Result Value Ref Range    Sodium 116 (LL) 136 - 145 mmol/L   Hepatitis panel, acute   Result Value Ref Range    Hepatitis B Surface AG Nonreactive Nonreactive    Hepatitis A  AB- IgM Nonreactive Nonreactive    Hepatitis B Core AB; IgM Nonreactive Nonreactive    Hepatitis C AB Nonreactive Nonreactive   Urinalysis Microscopic   Result Value Ref Range    WBC, Urine 1-5 1-5, NONE /HPF    RBC, Urine 3-5 NONE, 1-2, 3-5 /HPF    Mucus, Urine 3+ Reference range not established. /LPF    Hyaline Casts, Urine 1+ (A) NONE /LPF   Sodium, Urine Random   Result Value Ref Range    Sodium, Urine Random <10 mmol/L    Creatinine, Urine Random 176.3 20.0 - 370.0 mg/dL    Sodium/Creatinine Ratio     Osmolality, Urine   Result Value Ref Range    Osmolality, Urine Random 504 200 - 1,200 mOsm/kg   Osmolality   Result Value Ref Range    Osmolality, Serum 241 (L) 280 - 300 mOsm/kg   Sodium   Result Value Ref Range    Sodium 113 (LL) 136 - 145 mmol/L   Sodium   Result Value Ref Range    Sodium 113 (LL) 136 - 145 mmol/L   Sodium   Result Value Ref Range    Sodium 115 (LL) 136 - 145 mmol/L   Comprehensive Metabolic Panel   Result Value Ref Range    Glucose 95 74 - 99 mg/dL    Sodium 115 (LL) 136 - 145 mmol/L    Potassium 4.3 3.5 - 5.3 mmol/L    Chloride 84 (L) 98 - 107 mmol/L    Bicarbonate 24 21 - 32 mmol/L    Anion Gap 11 10 - 20 mmol/L    Urea Nitrogen 12 6 - 23 mg/dL    Creatinine 1.31 (H) 0.50 - 1.30 mg/dL    eGFR 65 >60 mL/min/1.73m*2    Calcium 8.2 (L) 8.6 - 10.3 mg/dL    Albumin 2.3 (L) 3.4 - 5.0 g/dL    Alkaline Phosphatase 203 (H) 33 - 120 U/L    Total Protein 5.0 (L) 6.4 - 8.2 g/dL     (H) 9 - 39 U/L    Bilirubin, Total 15.5 (H) 0.0 - 1.2 mg/dL     (H) 10 - 52 U/L   Protime-INR   Result Value Ref Range    Protime 13.5 (H) 9.8 - 12.8 seconds    INR 1.2 (H) 0.9 - 1.1   Magnesium   Result Value Ref Range    Magnesium 1.35 (L) 1.60 - 2.40 mg/dL   Phosphorus   Result Value Ref Range     Phosphorus 2.3 (L) 2.5 - 4.9 mg/dL   Sodium   Result Value Ref Range    Sodium 115 (LL) 136 - 145 mmol/L   Sodium   Result Value Ref Range    Sodium 115 (LL) 136 - 145 mmol/L   Blood Gas Venous Full Panel   Result Value Ref Range    POCT pH, Venous 7.40 7.33 - 7.43 pH    POCT pCO2, Venous 40 (L) 41 - 51 mm Hg    POCT pO2, Venous 61 (H) 35 - 45 mm Hg    POCT SO2, Venous 90 (H) 45 - 75 %    POCT Oxy Hemoglobin, Venous 87.1 (H) 45.0 - 75.0 %    POCT Hematocrit Calculated, Venous 33.0 (L) 41.0 - 52.0 %    POCT Sodium, Venous 112 (LL) 136 - 145 mmol/L    POCT Potassium, Venous 4.6 3.5 - 5.3 mmol/L    POCT Chloride, Venous 85 (L) 98 - 107 mmol/L    POCT Ionized Calicum, Venous 1.08 (L) 1.10 - 1.33 mmol/L    POCT Glucose, Venous 80 74 - 99 mg/dL    POCT Lactate, Venous 1.3 0.4 - 2.0 mmol/L    POCT Base Excess, Venous 0.0 -2.0 - 3.0 mmol/L    POCT HCO3 Calculated, Venous 24.8 22.0 - 26.0 mmol/L    POCT Hemoglobin, Venous 10.9 (L) 13.5 - 17.5 g/dL    POCT Anion Gap, Venous 7.0 (L) 10.0 - 25.0 mmol/L    Patient Temperature 37.0 degrees Celsius    FiO2 21 %   Sodium   Result Value Ref Range    Sodium 117 (LL) 136 - 145 mmol/L   Sodium   Result Value Ref Range    Sodium 116 (LL) 136 - 145 mmol/L   Ceruloplasmin   Result Value Ref Range    Ceruloplasmin 29.1 20.0 - 60.0 mg/dL   Liver/Kidney Microsome Type 1 Antibodies, Serum   Result Value Ref Range    Liver/Kidney Microsome Type 1 Antibodies <1:20 <1:20   IDRIS with Reflex to LISSETT   Result Value Ref Range    IDRIS Positive (A) Negative    IDRIS Pattern Speckled     IDRIS Titer 1:80    Anti-Mitochondrial Antibody   Result Value Ref Range    Anti-Mitochondrial Antibody Negative Negative   Immunoglobulins (IgG, IgA, IgM)   Result Value Ref Range    IgG 1,090 700 - 1,600 mg/dL    IgA 519 (H) 70 - 400 mg/dL    IgM 215 40 - 230 mg/dL   Sodium   Result Value Ref Range    Sodium 118 (LL) 136 - 145 mmol/L   LISSETT Panel   Result Value Ref Range    Anti-SM <0.2 <1.0 AI    Anti-RNP <0.2 <1.0 AI     Anti-SM/RNP <0.2 <1.0 AI    Anti-SSA <0.2 <1.0 AI    Anti-SSB <0.2 <1.0 AI    Anti-SCL-70 <0.2 <1.0 AI    Anti-MALINDA-1 IgG <0.2 <1.0 AI    Anti-Chromatin <0.2 <1.0 AI    Anti-Centromere <0.2 <1.0 AI    ANTI-RIBOSOMAL P <0.2 <1.0 AI    Anti-DNA (DS) <1.0 <5.0 IU/mL   Comprehensive Metabolic Panel   Result Value Ref Range    Glucose 73 (L) 74 - 99 mg/dL    Sodium 119 (LL) 136 - 145 mmol/L    Potassium 4.5 3.5 - 5.3 mmol/L    Chloride 88 (L) 98 - 107 mmol/L    Bicarbonate 25 21 - 32 mmol/L    Anion Gap 11 10 - 20 mmol/L    Urea Nitrogen 16 6 - 23 mg/dL    Creatinine 1.26 0.50 - 1.30 mg/dL    eGFR 69 >60 mL/min/1.73m*2    Calcium 7.6 (L) 8.6 - 10.3 mg/dL    Albumin 2.3 (L) 3.4 - 5.0 g/dL    Alkaline Phosphatase 182 (H) 33 - 120 U/L    Total Protein 4.5 (L) 6.4 - 8.2 g/dL     (H) 9 - 39 U/L    Bilirubin, Total 16.3 (H) 0.0 - 1.2 mg/dL     (H) 10 - 52 U/L   Protime-INR   Result Value Ref Range    Protime 12.2 9.8 - 12.8 seconds    INR 1.1 0.9 - 1.1   Magnesium   Result Value Ref Range    Magnesium 2.01 1.60 - 2.40 mg/dL   Phosphorus   Result Value Ref Range    Phosphorus 4.8 2.5 - 4.9 mg/dL   CBC   Result Value Ref Range    WBC 7.3 4.4 - 11.3 x10*3/uL    nRBC 1.4 (H) 0.0 - 0.0 /100 WBCs    RBC 2.99 (L) 4.50 - 5.90 x10*6/uL    Hemoglobin 10.0 (L) 13.5 - 17.5 g/dL    Hematocrit 27.2 (L) 41.0 - 52.0 %    MCV 91 80 - 100 fL    MCH 33.4 26.0 - 34.0 pg    MCHC 36.8 (H) 32.0 - 36.0 g/dL    RDW 15.7 (H) 11.5 - 14.5 %    Platelets 178 150 - 450 x10*3/uL   Basic Metabolic Panel   Result Value Ref Range    Glucose 80 74 - 99 mg/dL    Sodium 118 (LL) 136 - 145 mmol/L    Potassium 4.6 3.5 - 5.3 mmol/L    Chloride 88 (L) 98 - 107 mmol/L    Bicarbonate 22 21 - 32 mmol/L    Anion Gap 13 10 - 20 mmol/L    Urea Nitrogen 15 6 - 23 mg/dL    Creatinine 1.20 0.50 - 1.30 mg/dL    eGFR 73 >60 mL/min/1.73m*2    Calcium 7.5 (L) 8.6 - 10.3 mg/dL   Ammonia   Result Value Ref Range    Ammonia 79 (H) 16 - 53 umol/L   Basic Metabolic Panel    Result Value Ref Range    Glucose 96 74 - 99 mg/dL    Sodium 122 (L) 136 - 145 mmol/L    Potassium 4.4 3.5 - 5.3 mmol/L    Chloride 92 (L) 98 - 107 mmol/L    Bicarbonate 25 21 - 32 mmol/L    Anion Gap 9 (L) 10 - 20 mmol/L    Urea Nitrogen 15 6 - 23 mg/dL    Creatinine 1.08 0.50 - 1.30 mg/dL    eGFR 83 >60 mL/min/1.73m*2    Calcium 7.2 (L) 8.6 - 10.3 mg/dL   Protime-INR   Result Value Ref Range    Protime 12.3 9.8 - 12.8 seconds    INR 1.1 0.9 - 1.1   Magnesium   Result Value Ref Range    Magnesium 2.03 1.60 - 2.40 mg/dL   Phosphorus   Result Value Ref Range    Phosphorus 3.6 2.5 - 4.9 mg/dL   Comprehensive metabolic panel   Result Value Ref Range    Glucose 86 74 - 99 mg/dL    Sodium 125 (L) 136 - 145 mmol/L    Potassium 4.5 3.5 - 5.3 mmol/L    Chloride 91 (L) 98 - 107 mmol/L    Bicarbonate 29 21 - 32 mmol/L    Anion Gap 10 10 - 20 mmol/L    Urea Nitrogen 14 6 - 23 mg/dL    Creatinine 1.11 0.50 - 1.30 mg/dL    eGFR 80 >60 mL/min/1.73m*2    Calcium 7.7 (L) 8.6 - 10.3 mg/dL    Albumin 2.4 (L) 3.4 - 5.0 g/dL    Alkaline Phosphatase 201 (H) 33 - 120 U/L    Total Protein 4.5 (L) 6.4 - 8.2 g/dL     (H) 9 - 39 U/L    Bilirubin, Total 16.3 (H) 0.0 - 1.2 mg/dL     (H) 10 - 52 U/L   CBC and Auto Differential   Result Value Ref Range    WBC 6.2 4.4 - 11.3 x10*3/uL    nRBC 3.7 (H) 0.0 - 0.0 /100 WBCs    RBC 3.18 (L) 4.50 - 5.90 x10*6/uL    Hemoglobin 10.4 (L) 13.5 - 17.5 g/dL    Hematocrit 29.7 (L) 41.0 - 52.0 %    MCV 93 80 - 100 fL    MCH 32.7 26.0 - 34.0 pg    MCHC 35.0 32.0 - 36.0 g/dL    RDW 15.9 (H) 11.5 - 14.5 %    Platelets 215 150 - 450 x10*3/uL    Neutrophils % 46.2 40.0 - 80.0 %    Immature Granulocytes %, Automated 6.6 (H) 0.0 - 0.9 %    Lymphocytes % 21.7 13.0 - 44.0 %    Monocytes % 21.0 2.0 - 10.0 %    Eosinophils % 2.4 0.0 - 6.0 %    Basophils % 2.1 0.0 - 2.0 %    Neutrophils Absolute 2.88 1.20 - 7.70 x10*3/uL    Immature Granulocytes Absolute, Automated 0.41 0.00 - 0.70 x10*3/uL    Lymphocytes  Absolute 1.35 1.20 - 4.80 x10*3/uL    Monocytes Absolute 1.31 (H) 0.10 - 1.00 x10*3/uL    Eosinophils Absolute 0.15 0.00 - 0.70 x10*3/uL    Basophils Absolute 0.13 (H) 0.00 - 0.10 x10*3/uL   Bilirubin, Direct   Result Value Ref Range    Bilirubin, Direct 9.2 (H) 0.0 - 0.3 mg/dL   Morphology   Result Value Ref Range    RBC Morphology See Below     Hypochromia Mild     Target Cells Few    Basic Metabolic Panel   Result Value Ref Range    Glucose 100 (H) 74 - 99 mg/dL    Sodium 127 (L) 136 - 145 mmol/L    Potassium 3.9 3.5 - 5.3 mmol/L    Chloride 93 (L) 98 - 107 mmol/L    Bicarbonate 29 21 - 32 mmol/L    Anion Gap 9 (L) 10 - 20 mmol/L    Urea Nitrogen 13 6 - 23 mg/dL    Creatinine 1.02 0.50 - 1.30 mg/dL    eGFR 88 >60 mL/min/1.73m*2    Calcium 7.6 (L) 8.6 - 10.3 mg/dL   CBC   Result Value Ref Range    WBC 7.7 4.4 - 11.3 x10*3/uL    nRBC 2.5 (H) 0.0 - 0.0 /100 WBCs    RBC 3.19 (L) 4.50 - 5.90 x10*6/uL    Hemoglobin 10.7 (L) 13.5 - 17.5 g/dL    Hematocrit 29.9 (L) 41.0 - 52.0 %    MCV 94 80 - 100 fL    MCH 33.5 26.0 - 34.0 pg    MCHC 35.8 32.0 - 36.0 g/dL    RDW 17.0 (H) 11.5 - 14.5 %    Platelets 219 150 - 450 x10*3/uL   Basic Metabolic Panel   Result Value Ref Range    Glucose 77 74 - 99 mg/dL    Sodium 132 (L) 136 - 145 mmol/L    Potassium 3.9 3.5 - 5.3 mmol/L    Chloride 96 (L) 98 - 107 mmol/L    Bicarbonate 31 21 - 32 mmol/L    Anion Gap 9 (L) 10 - 20 mmol/L    Urea Nitrogen 11 6 - 23 mg/dL    Creatinine 0.96 0.50 - 1.30 mg/dL    eGFR >90 >60 mL/min/1.73m*2    Calcium 7.2 (L) 8.6 - 10.3 mg/dL         Imaging:  XR chest 1 view    Result Date: 1/12/2024  Interpreted By:  Maritza Solis, STUDY: XR CHEST 1 VIEW;  1/12/2024 2:14 am   INDICATION: Signs/Symptoms:hypoxemia.   COMPARISON: 02/04/2021   ACCESSION NUMBER(S): [id number]   ORDERING CLINICIAN: ORION LIANG   FINDINGS:     CARDIOMEDIASTINAL SILHOUETTE: Cardiomediastinal silhouette is normal in size and configuration.   LUNGS: No pulmonary consolidation,  pleural effusion or pneumothorax.   ABDOMEN: No remarkable upper abdominal findings.   BONES: No acute osseous abnormality.       No acute cardiopulmonary process.   MACRO: None   Signed by: Maritza Solis 1/12/2024 3:08 AM Dictation workstation:   [id number]    ECG 12 lead    Result Date: 1/10/2024  Sinus rhythm Probable left atrial enlargement Nonspecific intraventricular conduction delay Borderline repolarization abnormality See ED provider note for full interpretation and clinical correlation Confirmed by Dutch Weston ([id number]) on 1/10/2024 2:07:13 PM    CT head wo IV contrast    Result Date: 1/10/2024  Interpreted By:  Dipesh Hallman, STUDY: CT HEAD WO IV CONTRAST;  1/10/2024 11:57 am   INDICATION: Signs/Symptoms:Encephalopathy, severe hyponatremia.   COMPARISON: Comparison study is from 03/06/2011..   ACCESSION NUMBER(S): [id number]   ORDERING CLINICIAN: ALO BELLE   TECHNIQUE: Routine axial images were obtained from the skull base through the vertex.  Sagittal and coronal reconstruction images were generated. Brain, subdural, and bone windows were reviewed.   FINDINGS: INTRACRANIAL: Mild prominence of ventricles and sulci. There is mild patchy hypodensity throughout the deep periventricular white matter. No acute intracranial bleed, midline shift, or focal mass effect. No destructive bone lesion. No depressed skull fracture.     EXTRACRANIAL: Moderate fluid in the right maxillary sinus. Small retention cyst in the left maxillary sinus. Ethmoid, frontal, and sphenoid sinuses were clear. Visualized mastoid air cells were clear.       No acute intracranial bleed or focal mass effect.   Mild volume loss.   Mild chronic white matter ischemic disease in the deep periventricular regions.   Acute right maxillary sinusitis. Small left maxillary sinus retention cyst.   MACRO: None   Signed by: Dipesh Hallman 1/10/2024 12:27 PM Dictation workstation:   [id number]    US liver with doppler    Result Date:  1/10/2024  Interpreted By:  Dipesh Hallman, STUDY: US LIVER WITH DOPPLER  1/10/2024 11:54 am   INDICATION: 54 y/o   M with  Signs/Symptoms:liver disease; perform with liver doppler.   COMPARISON: Correlation with CT scan from 01/09/2024. Correlation with renal ultrasound from 03/06/2011.   ACCESSION NUMBER(S): [id number]   ORDERING CLINICIAN: ANABELLE MADERA   TECHNIQUE: Ultrasound of the abdomen was performed using grayscale imaging, color Doppler, and spectral Doppler.   The patient was in restraints and was combative. This did limit the exam somewhat.   FINDINGS: LIVER: Cranialcaudal length:  19.7cm, enlarged. Echogenicity:  Diffusely increased. Smooth liver margins. Mass: None.   GALLBLADDER: No shadowing stone, gallbladder wall thickening, adjacent edema, or sonographic Bergman sign.   BILE DUCTS: No intrahepatic biliary ductal dilatation. Common bile duct measured 4 mm in diameter. This is within the limits of normal.   PANCREAS: The pancreas was obscured by bowel gas..   PERITONEAL FLUID: No ascites.   SPLEEN: Measures  11.7cm in craniocaudal dimension, which is within normal limits.  No focal splenic lesion identified.   RIGHT KIDNEY: The right kidney measured  12.0 cm in length. It  was sonographically normal for size and echogenicity. There was no shadowing stone, hydronephrosis, or perinephric collection.   LEFT KIDNEY: The left  kidney measured  11.3 cm in length. It was sonographically normal for size and echogenicity. There was no shadowing stone, hydronephrosis, or perinephric collection. Upper pole parapelvic cyst measures 38 x 21 x 31 mm. This is grossly stable.   IVC AND ABDOMINAL AORTA: Not well seen.   HEPATIC ARTERIES: The following demonstrate patency and appropriate direction of flow: Main hepatic artery RI  0.82 which is mildly elevated; Right hepatic artery was not well seen; Left hepatic artery was not well seen;   SPLENIC VEIN: Splenic vein was not well seen   HEPATIC AND PORTAL VENOUS  BRANCHES: Right, middle and left hepatic veins were not well seen in this exam. Main portal vein measured  12 mm in diameter. Main portal vein flow was at 10.2   cm/sec. Direction of flow was grossly normal. The left portal vein was not well seen. Anterior branch and posterior branch of the right portal vein were reasonably well seen. Direction of flow was grossly normal. No recanalized periumbilical vein or splenorenal shunt identified.       The patient was in restraints and combative. This did result in a suboptimal exam. Because of this, the right and left hepatic arteries, splenic vein, middle, right, and left hepatic veins, and left portal vein were not well seen. Also, the pancreas was not well seen. Finally, the abdominal aorta and IVC were not well seen.   Blood flow in the main portal vein and the right portal veins was grossly normal in direction.   Main hepatic artery resistive index was mildly elevated. Significance of this is uncertain.   Hepatomegaly.  Nonspecific increased echogenicity throughout the liver, most likely fatty infiltration. Liver margins were smooth. No focal hepatic mass in this limited exam.   No splenomegaly. No ascites.   Grossly stable upper pole parapelvic left renal cyst.       MACRO: None   Signed by: Dipesh Hallman 1/10/2024 12:25 PM Dictation workstation:   [id number]    CT abdomen pelvis w IV contrast    Result Date: 1/9/2024  STUDY: CT Abdomen and Pelvis with IV Contrast; 1/9/2024 at 4:47 PM. INDICATION: Abdominal pain, jaundice. COMPARISON: None available. ACCESSION NUMBER(S): [id number] ORDERING CLINICIAN: NURY HERNANDEZ TECHNIQUE: CT of the abdomen and pelvis was performed.  Contiguous axial images were obtained at 3 mm slice thickness through the abdomen and pelvis. Coronal and sagittal reconstructions at 3 mm slice thickness were performed.  Omnipaque 350 100 mL was administered intravenously.  FINDINGS: LOWER CHEST: No cardiomegaly.  No pericardial effusion.  Lung  bases are clear.  ABDOMEN:  LIVER: No hepatomegaly.  Smooth surface contour.  Severe fatty infiltration of the liver.  BILE DUCTS: No intrahepatic or extrahepatic biliary ductal dilatation.  GALLBLADDER: The gallbladder is present without gallstones. STOMACH: Mild gastric wall thickening. PANCREAS: No masses or ductal dilatation.  SPLEEN: No splenomegaly or focal splenic lesion.  ADRENAL GLANDS: No thickening or nodules.  KIDNEYS AND URETERS: Kidneys are normal in size and location.  No renal or ureteral calculi.  Renal cysts measuring up to 3 cm on the left.  PELVIS:  BLADDER: No abnormalities identified.  REPRODUCTIVE ORGANS: No abnormalities identified.  BOWEL: Mildly dilated fluid-filled loops of small bowel centrally measuring up to 3.5 cm without a sharp transition point  VESSELS: No abnormalities identified.  Abdominal aorta is normal in caliber.  PERITONEUM/RETROPERITONEUM/LYMPH NODES: No free fluid.  No pneumoperitoneum. No lymphadenopathy.  ABDOMINAL WALL: No abnormalities identified. SOFT TISSUES: No abnormalities identified.  BONES: No acute fracture or aggressive osseous lesion.    1. Mild gastric wall thickening.  Correlate clinically for gastritis. 2. Mildly dilated fluid-filled loops of small bowel centrally measuring up to 3.5 cm without a sharp transition point. Findings suggestive of a mild ileus.  If symptoms do not improve/resolve, recommend repeat CT with oral contrast to assess transit of contrast through the dilated small bowel loops. 3. Severe hepatic steatosis.  No other CT findings to account for the patient's reported jaundice.  No biliary dilatation. Signed by Luis Bhardwaj MD

## 2024-01-14 LAB
ALBUMIN SERPL BCP-MCNC: 2.4 G/DL (ref 3.4–5)
ALP SERPL-CCNC: 176 U/L (ref 33–120)
ALT SERPL W P-5'-P-CCNC: 117 U/L (ref 10–52)
ANION GAP SERPL CALC-SCNC: 9 MMOL/L (ref 10–20)
AST SERPL W P-5'-P-CCNC: 147 U/L (ref 9–39)
BASOPHILS # BLD MANUAL: 0 X10*3/UL (ref 0–0.1)
BASOPHILS NFR BLD MANUAL: 0 %
BILIRUB SERPL-MCNC: 9.3 MG/DL (ref 0–1.2)
BUN SERPL-MCNC: 16 MG/DL (ref 6–23)
CALCIUM SERPL-MCNC: 7.6 MG/DL (ref 8.6–10.3)
CHLORIDE SERPL-SCNC: 93 MMOL/L (ref 98–107)
CO2 SERPL-SCNC: 30 MMOL/L (ref 21–32)
CREAT SERPL-MCNC: 1.16 MG/DL (ref 0.5–1.3)
EGFRCR SERPLBLD CKD-EPI 2021: 76 ML/MIN/1.73M*2
EOSINOPHIL # BLD MANUAL: 0 X10*3/UL (ref 0–0.7)
EOSINOPHIL NFR BLD MANUAL: 0 %
ERYTHROCYTE [DISTWIDTH] IN BLOOD BY AUTOMATED COUNT: 17.5 % (ref 11.5–14.5)
GLUCOSE BLD MANUAL STRIP-MCNC: 147 MG/DL (ref 74–99)
GLUCOSE SERPL-MCNC: 99 MG/DL (ref 74–99)
HCT VFR BLD AUTO: 30.3 % (ref 41–52)
HGB BLD-MCNC: 10.5 G/DL (ref 13.5–17.5)
IMM GRANULOCYTES # BLD AUTO: 0.96 X10*3/UL (ref 0–0.7)
IMM GRANULOCYTES NFR BLD AUTO: 11.7 % (ref 0–0.9)
LYMPHOCYTES # BLD MANUAL: 1.89 X10*3/UL (ref 1.2–4.8)
LYMPHOCYTES NFR BLD MANUAL: 23 %
MCH RBC QN AUTO: 33.4 PG (ref 26–34)
MCHC RBC AUTO-ENTMCNC: 34.7 G/DL (ref 32–36)
MCV RBC AUTO: 97 FL (ref 80–100)
METAMYELOCYTES # BLD MANUAL: 0.82 X10*3/UL
METAMYELOCYTES NFR BLD MANUAL: 10 %
MONOCYTES # BLD MANUAL: 1.64 X10*3/UL (ref 0.1–1)
MONOCYTES NFR BLD MANUAL: 20 %
NEUTROPHILS # BLD MANUAL: 3.85 X10*3/UL (ref 1.2–7.7)
NEUTS BAND # BLD MANUAL: 0.49 X10*3/UL (ref 0–0.7)
NEUTS BAND NFR BLD MANUAL: 6 %
NEUTS SEG # BLD MANUAL: 3.36 X10*3/UL (ref 1.2–7)
NEUTS SEG NFR BLD MANUAL: 41 %
NRBC BLD-RTO: 1.6 /100 WBCS (ref 0–0)
PLATELET # BLD AUTO: 236 X10*3/UL (ref 150–450)
POLYCHROMASIA BLD QL SMEAR: ABNORMAL
POTASSIUM SERPL-SCNC: 4 MMOL/L (ref 3.5–5.3)
PROT SERPL-MCNC: 4.8 G/DL (ref 6.4–8.2)
RBC # BLD AUTO: 3.14 X10*6/UL (ref 4.5–5.9)
RBC MORPH BLD: ABNORMAL
SODIUM SERPL-SCNC: 128 MMOL/L (ref 136–145)
SOLUBLE LIVER IGG SER IA-ACNC: 1.8 U (ref 0–24.9)
TARGETS BLD QL SMEAR: ABNORMAL
TOTAL CELLS COUNTED BLD: 100
WBC # BLD AUTO: 8.2 X10*3/UL (ref 4.4–11.3)

## 2024-01-14 PROCEDURE — 1100000001 HC PRIVATE ROOM DAILY

## 2024-01-14 PROCEDURE — 99231 SBSQ HOSP IP/OBS SF/LOW 25: CPT | Performed by: INTERNAL MEDICINE

## 2024-01-14 PROCEDURE — 2500000001 HC RX 250 WO HCPCS SELF ADMINISTERED DRUGS (ALT 637 FOR MEDICARE OP): Performed by: INTERNAL MEDICINE

## 2024-01-14 PROCEDURE — 84075 ASSAY ALKALINE PHOSPHATASE: CPT | Performed by: INTERNAL MEDICINE

## 2024-01-14 PROCEDURE — 36415 COLL VENOUS BLD VENIPUNCTURE: CPT | Performed by: INTERNAL MEDICINE

## 2024-01-14 PROCEDURE — 85027 COMPLETE CBC AUTOMATED: CPT | Performed by: INTERNAL MEDICINE

## 2024-01-14 PROCEDURE — 97116 GAIT TRAINING THERAPY: CPT | Mod: CQ,GP

## 2024-01-14 PROCEDURE — 2500000004 HC RX 250 GENERAL PHARMACY W/ HCPCS (ALT 636 FOR OP/ED): Performed by: INTERNAL MEDICINE

## 2024-01-14 PROCEDURE — 82947 ASSAY GLUCOSE BLOOD QUANT: CPT

## 2024-01-14 PROCEDURE — 85007 BL SMEAR W/DIFF WBC COUNT: CPT | Performed by: INTERNAL MEDICINE

## 2024-01-14 PROCEDURE — 99233 SBSQ HOSP IP/OBS HIGH 50: CPT | Performed by: INTERNAL MEDICINE

## 2024-01-14 PROCEDURE — C9113 INJ PANTOPRAZOLE SODIUM, VIA: HCPCS | Performed by: INTERNAL MEDICINE

## 2024-01-14 RX ADMIN — PANTOPRAZOLE SODIUM 40 MG: 40 INJECTION, POWDER, FOR SOLUTION INTRAVENOUS at 10:18

## 2024-01-14 RX ADMIN — LACTULOSE 20 G: 20 SOLUTION ORAL at 20:27

## 2024-01-14 RX ADMIN — METOPROLOL SUCCINATE 50 MG: 50 TABLET, EXTENDED RELEASE ORAL at 10:17

## 2024-01-14 RX ADMIN — LACTULOSE 20 G: 20 SOLUTION ORAL at 10:16

## 2024-01-14 RX ADMIN — THIAMINE HCL TAB 100 MG 100 MG: 100 TAB at 10:18

## 2024-01-14 RX ADMIN — FOLIC ACID 1 MG: 1 TABLET ORAL at 10:17

## 2024-01-14 RX ADMIN — Medication 1 TABLET: at 10:17

## 2024-01-14 ASSESSMENT — COGNITIVE AND FUNCTIONAL STATUS - GENERAL
HELP NEEDED FOR BATHING: A LITTLE
MOVING TO AND FROM BED TO CHAIR: A LOT
MOVING TO AND FROM BED TO CHAIR: A LOT
WALKING IN HOSPITAL ROOM: A LOT
TOILETING: A LOT
TOILETING: A LOT
MOVING FROM LYING ON BACK TO SITTING ON SIDE OF FLAT BED WITH BEDRAILS: A LITTLE
DAILY ACTIVITIY SCORE: 16
TURNING FROM BACK TO SIDE WHILE IN FLAT BAD: A LITTLE
STANDING UP FROM CHAIR USING ARMS: A LOT
WALKING IN HOSPITAL ROOM: A LOT
MOVING TO AND FROM BED TO CHAIR: A LOT
DAILY ACTIVITIY SCORE: 16
MOBILITY SCORE: 13
MOVING FROM LYING ON BACK TO SITTING ON SIDE OF FLAT BED WITH BEDRAILS: A LITTLE
CLIMB 3 TO 5 STEPS WITH RAILING: TOTAL
DRESSING REGULAR UPPER BODY CLOTHING: A LITTLE
DRESSING REGULAR LOWER BODY CLOTHING: A LOT
MOBILITY SCORE: 13
PERSONAL GROOMING: A LITTLE
STANDING UP FROM CHAIR USING ARMS: A LOT
WALKING IN HOSPITAL ROOM: A LOT
EATING MEALS: A LITTLE
STANDING UP FROM CHAIR USING ARMS: A LOT
TURNING FROM BACK TO SIDE WHILE IN FLAT BAD: A LITTLE
MOBILITY SCORE: 13
DRESSING REGULAR LOWER BODY CLOTHING: A LOT
DRESSING REGULAR UPPER BODY CLOTHING: A LITTLE
EATING MEALS: A LITTLE
HELP NEEDED FOR BATHING: A LITTLE
TURNING FROM BACK TO SIDE WHILE IN FLAT BAD: A LITTLE
CLIMB 3 TO 5 STEPS WITH RAILING: TOTAL
PERSONAL GROOMING: A LITTLE
MOVING FROM LYING ON BACK TO SITTING ON SIDE OF FLAT BED WITH BEDRAILS: A LITTLE
CLIMB 3 TO 5 STEPS WITH RAILING: TOTAL

## 2024-01-14 ASSESSMENT — PAIN SCALES - GENERAL
PAINLEVEL_OUTOF10: 0 - NO PAIN
PAINLEVEL_OUTOF10: 0 - NO PAIN

## 2024-01-14 ASSESSMENT — LIFESTYLE VARIABLES
TREMOR: 3
TOTAL SCORE: 4
TOTAL SCORE: 3
ORIENTATION AND CLOUDING OF SENSORIUM: ORIENTED AND CAN DO SERIAL ADDITIONS
HEADACHE, FULLNESS IN HEAD: NOT PRESENT
NAUSEA AND VOMITING: NO NAUSEA AND NO VOMITING
HEADACHE, FULLNESS IN HEAD: NOT PRESENT
VISUAL DISTURBANCES: NOT PRESENT
AGITATION: NORMAL ACTIVITY
NAUSEA AND VOMITING: NO NAUSEA AND NO VOMITING
VISUAL DISTURBANCES: NOT PRESENT
ANXIETY: NO ANXIETY, AT EASE
TREMOR: MODERATE, WITH PATIENT'S ARMS EXTENDED
ANXIETY: NO ANXIETY, AT EASE
PAROXYSMAL SWEATS: NO SWEAT VISIBLE
AUDITORY DISTURBANCES: NOT PRESENT
AGITATION: NORMAL ACTIVITY
PAROXYSMAL SWEATS: NO SWEAT VISIBLE
ORIENTATION AND CLOUDING OF SENSORIUM: ORIENTED AND CAN DO SERIAL ADDITIONS
PULSE: 72
AUDITORY DISTURBANCES: NOT PRESENT

## 2024-01-14 ASSESSMENT — PAIN - FUNCTIONAL ASSESSMENT: PAIN_FUNCTIONAL_ASSESSMENT: 0-10

## 2024-01-14 NOTE — PROGRESS NOTES
"      Nephrology Progress Note      Nephrology following for hypopatient's natremia.   Events over night: none  brother is at bedside     Patient has puréed diet and is not eating much.  He is still slow to answer questions at times not back to normal mental status    /73 (BP Location: Left arm)   Pulse 74   Temp 36.6 °C (97.9 °F) (Temporal)   Resp 21   Ht 1.854 m (6' 1\")   Wt 109 kg (240 lb 4.8 oz)   SpO2 99%   BMI 31.70 kg/m²     Input / Output:  24 HR:   Intake/Output Summary (Last 24 hours) at 1/13/2024 1907  Last data filed at 1/13/2024 0700  Gross per 24 hour   Intake --   Output 1250 ml   Net -1250 ml         Physical Exam   Alert and oriented x 2 NAD  Neck: no JVD  CV: RRR  Lungs: CTA bilaterally  Abd: soft, NT, ND   Ext: trace lower extremity edema    Scheduled medications  chlordiazePOXIDE, 25 mg, oral, q8h  folic acid, 1 mg, oral, Daily  lactulose, 20 g, oral, BID  metoprolol succinate XL, 50 mg, oral, Daily  multivitamin with minerals, 1 tablet, oral, Daily  pantoprazole, 40 mg, intravenous, Daily  thiamine, 100 mg, oral, Daily      Continuous medications     PRN medications  PRN medications: dextrose 10 % in water (D10W), dextrose, glucagon, LORazepam **OR** LORazepam **OR** LORazepam, ondansetron, oxygen   Results from last 7 days   Lab Units 01/13/24  0505 01/12/24  1632 01/12/24  0531   SODIUM mmol/L 132*   < > 125*   POTASSIUM mmol/L 3.9   < > 4.5   CHLORIDE mmol/L 96*   < > 91*   CO2 mmol/L 31   < > 29   BUN mg/dL 11   < > 14   CREATININE mg/dL 0.96   < > 1.11   CALCIUM mg/dL 7.2*   < > 7.7*   PROTEIN TOTAL g/dL  --   --  4.5*   BILIRUBIN TOTAL mg/dL  --   --  16.3*   ALK PHOS U/L  --   --  201*   ALT U/L  --   --  145*   AST U/L  --   --  214*   GLUCOSE mg/dL 77   < > 86    < > = values in this interval not displayed.        Results from last 7 days   Lab Units 01/12/24  0531 01/11/24  0624 01/10/24  0428   MAGNESIUM mg/dL 2.03 2.01 1.35*        Results from last 7 days   Lab Units " 01/13/24  0505 01/12/24  0531 01/11/24  0624   WBC AUTO x10*3/uL 7.7 6.2 7.3   HEMOGLOBIN g/dL 10.7* 10.4* 10.0*   HEMATOCRIT % 29.9* 29.7* 27.2*   PLATELETS AUTO x10*3/uL 219 215 178          Assessment & Plan:     Patient is 53 y.o. male with PMHx of AUD, HTN and HLD presented to the ED on 1/9/24 with complaints of progressive weakness is admitted to hospital for alcoholic hepatitis and hyponatremia. Nephrology consulted in view of hyponatremia.     Hypotonic Hyponatremia  -Suspect chronic issue with baseline serum sodium in the high 120s  -Beer Potomania but he was having poor intake last week with nausea and vomiting  -Na was 113 on admission  -patient received a bolus of NS on 1/9  -started on  mL/hr which ran for approximately 4 hours  -given D5W for the following 7 hours, infusion stopped at 0100 on 1/10/24  -LR set at 75 mL/hr was started yesterday and serum sodium up to 119  -Level did start to drop again off LR now 118   -Sosm 241,  Uosm 504 Mary < 10  -hypovolemic hyponatremia but this is also could be the acute liver injury playing a role  -Serum sodium corrected to 132 appropriately over admission     Hypomagnesemia   -1.35 on 1/10/24  -Now 2.03     Hypophosphatemia  -in the setting of alcohol use disorder and decreased food intake  -improved     Recommendations:   -Can just check to q24 hours sodium now  -1500 mL fluid restriction    Please message me through EPIC chat with any questions or concerns.     Nandini Agee DO  1/13/2024  7:07 PM     [facility]    [address], [address]   [place] [address]  Office: [phone]

## 2024-01-14 NOTE — PROGRESS NOTES
Physical Therapy  Physical Therapy Treatment    Patient Name: Selvin Ochoa  MRN: [id number]  Today's Date: 1/14/2024  Time Calculation  Start Time: 0910  Stop Time: 0934  Time Calculation (min): 24 min     Assessment/Plan   PT Plan  Treatment/Interventions: Bed mobility, Transfer training, Gait training, Endurance training, Strengthening  PT Plan: Skilled PT  PT Frequency: 4 times per week  PT Discharge Recommendations: Moderate intensity level of continued care  Equipment Recommended upon Discharge:  (TBD)  PT Recommended Transfer Status: Assist x2  PT - OK to Discharge: Yes (WHEN MEDICALLY CLEARED)    General Visit Information:   PT  Visit  PT Received On: 01/14/24  Response to Previous Treatment: Patient with no complaints from previous session.  Reason for Referral: IMPAIRED MOBILITY GAIT TRAINING  Room: [facility]    Subjective   Precautions:  Falls     Objective   Pain:  Pain Score: 0 - No pain    Cognition:  Disoriented to time  Disoriented to situation    Activity Tolerance:  Activity Tolerance  Endurance: Tolerates 10 - 20 min exercise with multiple rests    Treatments:  Bed Mobility  Supine to sitting: Tim  Sitting to supine: Tim  Scooting: Tim  Log roll: Tim    Ambulation/Gait Training  12 feet x2 reps with RW, modA  Wide SHANTE, decreased step length, leaning heavily on FWW    Transfers  Sit to stand: modA  Stand to sit: modA  Commode transfer: modA  Transfer Device: Rolling Walker      Other Activity  Other Activity Performed:  (pt in bed pre/post tx)    Outcome Measures:  [facility] Basic Mobility  Turning from your back to your side while in a flat bed without using bedrails: A little  Moving from lying on your back to sitting on the side of a flat bed without using bedrails: A little  Moving to and from bed to chair (including a wheelchair): A lot  Standing up from a chair using your arms (e.g. wheelchair or bedside chair): A lot  To walk in hospital room: A lot  Climbing 3-5 steps with railing: Total  Basic  Mobility - Total Score: 13    Education Documentation  Mobility Training, taught by Maximino Sawyer PTA at 1/14/2024 12:59 PM.  Learner: Patient  Readiness: Acceptance  Method: Explanation, Demonstration  Response: Needs Reinforcement    Education Comments  No comments found.        OP EDUCATION:       Encounter Problems       Encounter Problems (Active)       Mobility       STG - Patient will ambulate (Progressing)       Start:  01/12/24    Expected End:  02/02/24       FWW MIN X2 75 FT         STRENGTHENING (Progressing)       Start:  01/12/24    Expected End:  02/02/24       20+ REPS EX INCREASING STRENGTH TO PROGRESS TO OOB ACTIVITIES            Transfers       STG - Transfer from bed to chair (Progressing)       Start:  01/12/24    Expected End:  01/26/24       FWW MIN X1-2A         STG - Patient to transfer to and from sit to supine (Progressing)       Start:  01/12/24    Expected End:  01/19/24       MIN A X1 USING RAILS HOB FLAT         STG - Patient will transfer sit to and from stand (Progressing)       Start:  01/12/24    Expected End:  01/22/24       FWW MIN 1-2 A USING PROPER TECHNIQUE

## 2024-01-14 NOTE — PROGRESS NOTES
Selvin Ochoa is a 53 y.o. male on day 4 of admission presenting with Hyponatremia.    Subjective   Selvin Ochoa is a 53 y.o. male with a PMH of HTN, HLD and EtOH use disorder presenting with weakness.      He reports progressive weakness for the slat three days. The patient reports that he has not been able to keep any solid food down the past 3 days as well.  He has been able to maintain fluids.  The patient drinks 8-10 tall boys per day.  He states that he has cut down.  The patient has had seizure activity when he has tried to quit drinking.  The patient denies any fevers, chills, night cough, or diarrhea.  He reports normal urination.  He noted yesterday that his eyes and chest were turning yellow.  The patient denies any diagnosis of cirrhosis or liver dysfunction.  The patient reports that his last drink was last night but then changed and stated that his last drink was this morning.  He denies any contacts.  He is not having any chest pain, shortness of breath, dizziness, palpitations, paresthesias, focal weakness.  Denies any abdominal pain.       1/10: Patient is now in barb EtOH withdrawal. He required phenobarbital this AM.   1/11: Patient was seen and examined.  Continues to be in alcohol withdrawal.  Sleeping quietly when I saw after getting phenobarb this morning.  Requiring 2 L nasal cannula oxygen to maintain saturation.  Hyponatremia improving with sodium of 119 this morning.  Continue IV LR for now.  Nephrologist on board.  1/12:Patient was seen and examined. Still drowsy but arousable and confused. Saturating well on room air today. Hyponatremia continues to improve 125 today.    Bilirubin slightly elevated today with AST ALT trending down.  Discussed with patient's brother at bedside.  GI and nephrologist recommendations appreciated.  Continue to trend CMP daily.  1/13: Patient was seen and examined.  More awake and interactive today.  Sodium is improved to 132 today.  Bilirubin still elevated  "but AST ALT trending down.  Trend CMP daily.  Potential discharge extended-care facility within 48 to 72 hours.    1/14: Patient was seen and examined.  Sodium is now 128 today which could be around his baseline in the setting of advancing liver disease.  AST ALT and bilirubin trending down.    Objective     Physical Exam  Constitutional:       General: He is in acute distress.      Appearance: He is ill-appearing and diaphoretic.   HENT:      Head: Normocephalic and atraumatic.      Mouth/Throat:      Mouth: Mucous membranes are dry.   Eyes:      Extraocular Movements: Extraocular movements intact.      Pupils: Pupils are equal, round, and reactive to light.   Cardiovascular:      Rate and Rhythm: Tachycardia present.      Heart sounds: No murmur heard.     No friction rub. No gallop.   Pulmonary:      Effort: Pulmonary effort is normal. No respiratory distress.      Breath sounds: Rales present. No wheezing or rhonchi.   Abdominal:      General: Abdomen is flat. There is no distension.      Palpations: Abdomen is soft.      Tenderness: There is no abdominal tenderness.   Musculoskeletal:         General: No swelling.   Skin:     General: Skin is warm.   Neurological:      Mental Status: He is disoriented.         Last Recorded Vitals  Blood pressure 88/60, pulse 91, temperature 36.1 °C (97 °F), temperature source Temporal, resp. rate 18, height 1.854 m (6' 1\"), weight 109 kg (239 lb 3.2 oz), SpO2 96 %.  Intake/Output last 3 Shifts:  I/O last 3 completed shifts:  In: 590 (5.4 mL/kg) [P.O.:590]  Out: 2150 (19.8 mL/kg) [Urine:2045 (0.5 mL/kg/hr); Stool:105]  Weight: 108.5 kg     Relevant Results                This patient has a urinary catheter   Reason for the urinary catheter remaining today? critically ill patient who need accurate urinary output measurements               Assessment/Plan   Hypovolemic Hyponatremia (likely beer potomania as well)   -Na 113 on admit   -IV fluid LR for now  -Trend Na Q4, goal " correction of 6 to 8 mEq/24hrs  -Continue fluid restriction  -Intensivist and nephrology on board     Alcohol Use Disorder with Risk for withdrawal   -he is now experiencing complex withdrawal   -Continue phenobarbital taper in addition to   -Continue lorazepam via the [facility] protocol.     Alcoholic Hepatitis   -DF 22.4 on admit, no indication for steroids at this time   -Hepatitis panel negative  -trend CMP and INR   -additional labs per GI pending   -Imaging with hepatic steatosis   -GI on board    Gastritis   -start PPI      Possible Ileus   -CLD for now   -Will consult surgery if he worsens       YOLANDA   -prerenal in the setting of above   -repleting volume      HTN  -holding home meds with normal BP      DVT ppx   -SCDs    I spent 30 minutes in the follow-up management of this patient    Tripp Justice MD

## 2024-01-14 NOTE — PROGRESS NOTES
"      Nephrology Progress Note      Nephrology following for hypopatient's natremia.   Events over night: none  Patient has puréed diet and is not eating much.  He is still slow to answer questions at times     /62 (BP Location: Left arm, Patient Position: Lying)   Pulse 72   Temp 36.9 °C (98.5 °F) (Temporal)   Resp 20   Ht 1.854 m (6' 1\")   Wt 109 kg (239 lb 3.2 oz)   SpO2 94%   BMI 31.56 kg/m²     Input / Output:  24 HR:   Intake/Output Summary (Last 24 hours) at 1/14/2024 1156  Last data filed at 1/14/2024 0636  Gross per 24 hour   Intake 590 ml   Output 900 ml   Net -310 ml         Physical Exam   Alert and oriented x 2 NAD  Neck: no JVD  CV: RRR  Lungs: CTA bilaterally  Abd: soft, NT, ND   Ext: trace lower extremity edema    Scheduled medications  [MAR Hold] chlordiazePOXIDE, 25 mg, oral, q8h  folic acid, 1 mg, oral, Daily  lactulose, 20 g, oral, BID  metoprolol succinate XL, 50 mg, oral, Daily  multivitamin with minerals, 1 tablet, oral, Daily  pantoprazole, 40 mg, intravenous, Daily  thiamine, 100 mg, oral, Daily      Continuous medications     PRN medications  PRN medications: dextrose 10 % in water (D10W), dextrose, glucagon, LORazepam **OR** LORazepam **OR** LORazepam, ondansetron, oxygen   Results from last 7 days   Lab Units 01/14/24  0354   SODIUM mmol/L 128*   POTASSIUM mmol/L 4.0   CHLORIDE mmol/L 93*   CO2 mmol/L 30   BUN mg/dL 16   CREATININE mg/dL 1.16   CALCIUM mg/dL 7.6*   PROTEIN TOTAL g/dL 4.8*   BILIRUBIN TOTAL mg/dL 9.3*   ALK PHOS U/L 176*   ALT U/L 117*   AST U/L 147*   GLUCOSE mg/dL 99        Results from last 7 days   Lab Units 01/12/24  0531 01/11/24  0624 01/10/24  0428   MAGNESIUM mg/dL 2.03 2.01 1.35*        Results from last 7 days   Lab Units 01/14/24  0354 01/13/24  0505 01/12/24  0531   WBC AUTO x10*3/uL 8.2 7.7 6.2   HEMOGLOBIN g/dL 10.5* 10.7* 10.4*   HEMATOCRIT % 30.3* 29.9* 29.7*   PLATELETS AUTO x10*3/uL 236 219 215          Assessment & Plan:     Patient is 53 " y.o. male with PMHx of AUD, HTN and HLD presented to the ED on 1/9/24 with complaints of progressive weakness is admitted to hospital for alcoholic hepatitis and hyponatremia. Nephrology consulted in view of hyponatremia.     Hypotonic Hyponatremia  -Suspect chronic issue with baseline serum sodium in the high 120s  -Beer Potomania but he was having poor intake last week with nausea and vomiting  -Na was 113 on admission  -patient received a bolus of NS on 1/9  -started on  mL/hr which ran for approximately 4 hours  -given D5W for the following 7 hours, infusion stopped at 0100 on 1/10/24  -LR set at 75 mL/hr was started yesterday and serum sodium up to 119  -Level did start to drop again off LR now 118   -Sosm 241,  Uosm 504 Mary < 10  -hypovolemic hyponatremia but this is also could be the acute liver injury playing a role  -Serum sodium corrected to 132 appropriately over admission  Now 128 past 24 hours which may still be his baseline     Hypomagnesemia   -1.35 on 1/10/24  -Now 2.03     Hypophosphatemia  -in the setting of alcohol use disorder and decreased food intake  -improved     Recommendations:   -Can just check to q24 hours sodium now  -1500 mL fluid restriction    Please message me through EPIC chat with any questions or concerns.     Nandini Agee DO  1/14/2024  11:56 AM     [facility]    [address] [place] [address], [address]   [place] [address]  Office: [phone]

## 2024-01-14 NOTE — PROGRESS NOTES
"[facility] Gastroenterology Progress Note    ASSESSMENT and PLAN:       Selvin Ochoa is a 53 y.o. male with a significant past medical history of hypertension, hyperlipidemia and EtOH abuse who presented with weakness. GI was consulted for \" alcohol hepatitis\".        Etoh Hepatitis   MDF  22.3 indicating good prognosis with no current indication for prednisolone. Acute hepatitis panel negative. Additional liver labs pending at this time. RUQ US showed hepatomegaly and a fatty liver.   - Daily LFTs and INR  - Supportive care      2. EtOH abuse  Actively drinking prior to admissionrStrict abstinence is needed.  - agree with monitoring for EtOH withdrawal and medicating based on CIWA scale  - continue with thiamine and folate  - abstinence is essential and social work should be involved to provide resources for quitting including AA     3. Hyponatremia   - per Raisa Baeza DO   Gastroenterology     Subjective        Overnight   Patient alert and oriented this a.m. he is tolerating oral intake.                PAST HISTORIES:       Past Medical History:  He has a past medical history of Personal history of other diseases of the circulatory system.    Past Surgical History:  He has a past surgical history that includes Other surgical history (11/10/2021).      Social History:  He reports that he has quit smoking. His smoking use included cigarettes. His smokeless tobacco use includes chew. He reports current alcohol use. He reports that he does not use drugs.    Family History:  No known GI disease, specifically denies pancreatitis, Crohn's, colon cancer, gastroesophageal cancer, or ulcerative colitis.    Family History   Problem Relation Name Age of Onset    Heart failure Father          Allergies:  Patient has no known allergies.      OBJECTIVE:       Last Recorded Vitals:  Vitals:    01/14/24 0220 01/14/24 0539 01/14/24 0600 01/14/24 0911   BP: 112/71 106/62     BP Location: Left arm Left arm   " "  Patient Position: Lying Lying     Pulse: 68 72  72   Resp: 20 20     Temp: 37.1 °C (98.8 °F) 36.9 °C (98.5 °F)     TempSrc: Temporal Temporal     SpO2: 96% 94%     Weight:   109 kg (239 lb 3.2 oz)    Height:         /62 (BP Location: Left arm, Patient Position: Lying)   Pulse 72   Temp 36.9 °C (98.5 °F) (Temporal)   Resp 20   Ht 1.854 m (6' 1\")   Wt 109 kg (239 lb 3.2 oz)   SpO2 94%   BMI 31.56 kg/m²      Physical Exam:    Physical Exam  Constitutional:       Appearance: Normal appearance.   HENT:      Mouth/Throat:      Mouth: Mucous membranes are moist.   Eyes:      Extraocular Movements: Extraocular movements intact.   Cardiovascular:      Rate and Rhythm: Normal rate and regular rhythm.      Heart sounds: Normal heart sounds.   Pulmonary:      Effort: Pulmonary effort is normal. No respiratory distress.      Breath sounds: Normal breath sounds. No wheezing.   Abdominal:      General: Abdomen is flat. Bowel sounds are normal. There is no distension.      Palpations: Abdomen is soft.      Tenderness: There is no abdominal tenderness. There is no rebound.   Musculoskeletal:         General: Normal range of motion.   Skin:     General: Skin is warm and dry.   Neurological:      General: No focal deficit present.      Mental Status: He is alert. Mental status is at baseline. He is disoriented.   Psychiatric:         Mood and Affect: Mood normal.         Behavior: Behavior normal.             Inpatient Medications:  [MAR Hold] chlordiazePOXIDE, 25 mg, oral, q8h  folic acid, 1 mg, oral, Daily  lactulose, 20 g, oral, BID  metoprolol succinate XL, 50 mg, oral, Daily  multivitamin with minerals, 1 tablet, oral, Daily  pantoprazole, 40 mg, intravenous, Daily  thiamine, 100 mg, oral, Daily      PRN medications: dextrose 10 % in water (D10W), dextrose, glucagon, LORazepam **OR** LORazepam **OR** LORazepam, ondansetron, oxygen    Outpatient Medications:  Prior to Admission medications    Medication Sig Start " Date End Date Taking? Authorizing Provider   amLODIPine (Norvasc) 5 mg tablet TAKE ONE TABLET BY MOUTH ONCE DAILY 12/23/23   Pankaj Rios MD   atorvastatin (Lipitor) 10 mg tablet Take 1 tablet (10 mg) by mouth once daily. 6/15/23   Pankaj Rios MD   lisinopril 40 mg tablet TAKE ONE TABLET BY MOUTH EVERY DAY 12/26/23   Pankaj Rios MD   metoprolol succinate XL (Toprol-XL) 50 mg 24 hr tablet TAKE ONE TABLET BY MOUTH ONCE DAILY. DO NOT CRUSH OR CHEW. 12/26/23   Pankaj Rios MD       LABS AND IMAGING:     Labs:  Recent labs reviewed in the EMR.    Results for orders placed or performed during the hospital encounter of 01/09/24 (from the past 96 hour(s))   Sodium   Result Value Ref Range    Sodium 115 (LL) 136 - 145 mmol/L   Blood Gas Venous Full Panel   Result Value Ref Range    POCT pH, Venous 7.40 7.33 - 7.43 pH    POCT pCO2, Venous 40 (L) 41 - 51 mm Hg    POCT pO2, Venous 61 (H) 35 - 45 mm Hg    POCT SO2, Venous 90 (H) 45 - 75 %    POCT Oxy Hemoglobin, Venous 87.1 (H) 45.0 - 75.0 %    POCT Hematocrit Calculated, Venous 33.0 (L) 41.0 - 52.0 %    POCT Sodium, Venous 112 (LL) 136 - 145 mmol/L    POCT Potassium, Venous 4.6 3.5 - 5.3 mmol/L    POCT Chloride, Venous 85 (L) 98 - 107 mmol/L    POCT Ionized Calicum, Venous 1.08 (L) 1.10 - 1.33 mmol/L    POCT Glucose, Venous 80 74 - 99 mg/dL    POCT Lactate, Venous 1.3 0.4 - 2.0 mmol/L    POCT Base Excess, Venous 0.0 -2.0 - 3.0 mmol/L    POCT HCO3 Calculated, Venous 24.8 22.0 - 26.0 mmol/L    POCT Hemoglobin, Venous 10.9 (L) 13.5 - 17.5 g/dL    POCT Anion Gap, Venous 7.0 (L) 10.0 - 25.0 mmol/L    Patient Temperature 37.0 degrees Celsius    FiO2 21 %   Sodium   Result Value Ref Range    Sodium 117 (LL) 136 - 145 mmol/L   Sodium   Result Value Ref Range    Sodium 116 (LL) 136 - 145 mmol/L   Ceruloplasmin   Result Value Ref Range    Ceruloplasmin 29.1 20.0 - 60.0 mg/dL   Liver/Kidney Microsome Type 1 Antibodies, Serum   Result Value Ref Range    Liver/Kidney  Microsome Type 1 Antibodies <1:20 <1:20   IDRIS with Reflex to LISSETT   Result Value Ref Range    IDRIS Positive (A) Negative    IDRIS Pattern Speckled     IDRIS Titer 1:80    Anti-Mitochondrial Antibody   Result Value Ref Range    Anti-Mitochondrial Antibody Negative Negative   Alpha-1 Antitrypsin Phenotype   Result Value Ref Range    Alpha-1 Antitrypsin Phenotype M1M2     Alpha-1-Antitrypsin 211 (H) 90 - 200 mg/dL   Immunoglobulins (IgG, IgA, IgM)   Result Value Ref Range    IgG 1,090 700 - 1,600 mg/dL    IgA 519 (H) 70 - 400 mg/dL    IgM 215 40 - 230 mg/dL   Soluble Liver Ag Abs   Result Value Ref Range    Soluble Liver Antigen Antibody, IgG 1.8 0.0 - 24.9 U   Sodium   Result Value Ref Range    Sodium 118 (LL) 136 - 145 mmol/L   LISSETT Panel   Result Value Ref Range    Anti-SM <0.2 <1.0 AI    Anti-RNP <0.2 <1.0 AI    Anti-SM/RNP <0.2 <1.0 AI    Anti-SSA <0.2 <1.0 AI    Anti-SSB <0.2 <1.0 AI    Anti-SCL-70 <0.2 <1.0 AI    Anti-MALINDA-1 IgG <0.2 <1.0 AI    Anti-Chromatin <0.2 <1.0 AI    Anti-Centromere <0.2 <1.0 AI    ANTI-RIBOSOMAL P <0.2 <1.0 AI    Anti-DNA (DS) <1.0 <5.0 IU/mL   Comprehensive Metabolic Panel   Result Value Ref Range    Glucose 73 (L) 74 - 99 mg/dL    Sodium 119 (LL) 136 - 145 mmol/L    Potassium 4.5 3.5 - 5.3 mmol/L    Chloride 88 (L) 98 - 107 mmol/L    Bicarbonate 25 21 - 32 mmol/L    Anion Gap 11 10 - 20 mmol/L    Urea Nitrogen 16 6 - 23 mg/dL    Creatinine 1.26 0.50 - 1.30 mg/dL    eGFR 69 >60 mL/min/1.73m*2    Calcium 7.6 (L) 8.6 - 10.3 mg/dL    Albumin 2.3 (L) 3.4 - 5.0 g/dL    Alkaline Phosphatase 182 (H) 33 - 120 U/L    Total Protein 4.5 (L) 6.4 - 8.2 g/dL     (H) 9 - 39 U/L    Bilirubin, Total 16.3 (H) 0.0 - 1.2 mg/dL     (H) 10 - 52 U/L   Protime-INR   Result Value Ref Range    Protime 12.2 9.8 - 12.8 seconds    INR 1.1 0.9 - 1.1   Magnesium   Result Value Ref Range    Magnesium 2.01 1.60 - 2.40 mg/dL   Phosphorus   Result Value Ref Range    Phosphorus 4.8 2.5 - 4.9 mg/dL   CBC   Result  Value Ref Range    WBC 7.3 4.4 - 11.3 x10*3/uL    nRBC 1.4 (H) 0.0 - 0.0 /100 WBCs    RBC 2.99 (L) 4.50 - 5.90 x10*6/uL    Hemoglobin 10.0 (L) 13.5 - 17.5 g/dL    Hematocrit 27.2 (L) 41.0 - 52.0 %    MCV 91 80 - 100 fL    MCH 33.4 26.0 - 34.0 pg    MCHC 36.8 (H) 32.0 - 36.0 g/dL    RDW 15.7 (H) 11.5 - 14.5 %    Platelets 178 150 - 450 x10*3/uL   Basic Metabolic Panel   Result Value Ref Range    Glucose 80 74 - 99 mg/dL    Sodium 118 (LL) 136 - 145 mmol/L    Potassium 4.6 3.5 - 5.3 mmol/L    Chloride 88 (L) 98 - 107 mmol/L    Bicarbonate 22 21 - 32 mmol/L    Anion Gap 13 10 - 20 mmol/L    Urea Nitrogen 15 6 - 23 mg/dL    Creatinine 1.20 0.50 - 1.30 mg/dL    eGFR 73 >60 mL/min/1.73m*2    Calcium 7.5 (L) 8.6 - 10.3 mg/dL   Ammonia   Result Value Ref Range    Ammonia 79 (H) 16 - 53 umol/L   Basic Metabolic Panel   Result Value Ref Range    Glucose 96 74 - 99 mg/dL    Sodium 122 (L) 136 - 145 mmol/L    Potassium 4.4 3.5 - 5.3 mmol/L    Chloride 92 (L) 98 - 107 mmol/L    Bicarbonate 25 21 - 32 mmol/L    Anion Gap 9 (L) 10 - 20 mmol/L    Urea Nitrogen 15 6 - 23 mg/dL    Creatinine 1.08 0.50 - 1.30 mg/dL    eGFR 83 >60 mL/min/1.73m*2    Calcium 7.2 (L) 8.6 - 10.3 mg/dL   Protime-INR   Result Value Ref Range    Protime 12.3 9.8 - 12.8 seconds    INR 1.1 0.9 - 1.1   Magnesium   Result Value Ref Range    Magnesium 2.03 1.60 - 2.40 mg/dL   Phosphorus   Result Value Ref Range    Phosphorus 3.6 2.5 - 4.9 mg/dL   Comprehensive metabolic panel   Result Value Ref Range    Glucose 86 74 - 99 mg/dL    Sodium 125 (L) 136 - 145 mmol/L    Potassium 4.5 3.5 - 5.3 mmol/L    Chloride 91 (L) 98 - 107 mmol/L    Bicarbonate 29 21 - 32 mmol/L    Anion Gap 10 10 - 20 mmol/L    Urea Nitrogen 14 6 - 23 mg/dL    Creatinine 1.11 0.50 - 1.30 mg/dL    eGFR 80 >60 mL/min/1.73m*2    Calcium 7.7 (L) 8.6 - 10.3 mg/dL    Albumin 2.4 (L) 3.4 - 5.0 g/dL    Alkaline Phosphatase 201 (H) 33 - 120 U/L    Total Protein 4.5 (L) 6.4 - 8.2 g/dL     (H) 9 - 39  U/L    Bilirubin, Total 16.3 (H) 0.0 - 1.2 mg/dL     (H) 10 - 52 U/L   CBC and Auto Differential   Result Value Ref Range    WBC 6.2 4.4 - 11.3 x10*3/uL    nRBC 3.7 (H) 0.0 - 0.0 /100 WBCs    RBC 3.18 (L) 4.50 - 5.90 x10*6/uL    Hemoglobin 10.4 (L) 13.5 - 17.5 g/dL    Hematocrit 29.7 (L) 41.0 - 52.0 %    MCV 93 80 - 100 fL    MCH 32.7 26.0 - 34.0 pg    MCHC 35.0 32.0 - 36.0 g/dL    RDW 15.9 (H) 11.5 - 14.5 %    Platelets 215 150 - 450 x10*3/uL    Neutrophils % 46.2 40.0 - 80.0 %    Immature Granulocytes %, Automated 6.6 (H) 0.0 - 0.9 %    Lymphocytes % 21.7 13.0 - 44.0 %    Monocytes % 21.0 2.0 - 10.0 %    Eosinophils % 2.4 0.0 - 6.0 %    Basophils % 2.1 0.0 - 2.0 %    Neutrophils Absolute 2.88 1.20 - 7.70 x10*3/uL    Immature Granulocytes Absolute, Automated 0.41 0.00 - 0.70 x10*3/uL    Lymphocytes Absolute 1.35 1.20 - 4.80 x10*3/uL    Monocytes Absolute 1.31 (H) 0.10 - 1.00 x10*3/uL    Eosinophils Absolute 0.15 0.00 - 0.70 x10*3/uL    Basophils Absolute 0.13 (H) 0.00 - 0.10 x10*3/uL   Bilirubin, Direct   Result Value Ref Range    Bilirubin, Direct 9.2 (H) 0.0 - 0.3 mg/dL   Morphology   Result Value Ref Range    RBC Morphology See Below     Hypochromia Mild     Target Cells Few    Basic Metabolic Panel   Result Value Ref Range    Glucose 100 (H) 74 - 99 mg/dL    Sodium 127 (L) 136 - 145 mmol/L    Potassium 3.9 3.5 - 5.3 mmol/L    Chloride 93 (L) 98 - 107 mmol/L    Bicarbonate 29 21 - 32 mmol/L    Anion Gap 9 (L) 10 - 20 mmol/L    Urea Nitrogen 13 6 - 23 mg/dL    Creatinine 1.02 0.50 - 1.30 mg/dL    eGFR 88 >60 mL/min/1.73m*2    Calcium 7.6 (L) 8.6 - 10.3 mg/dL   CBC   Result Value Ref Range    WBC 7.7 4.4 - 11.3 x10*3/uL    nRBC 2.5 (H) 0.0 - 0.0 /100 WBCs    RBC 3.19 (L) 4.50 - 5.90 x10*6/uL    Hemoglobin 10.7 (L) 13.5 - 17.5 g/dL    Hematocrit 29.9 (L) 41.0 - 52.0 %    MCV 94 80 - 100 fL    MCH 33.5 26.0 - 34.0 pg    MCHC 35.8 32.0 - 36.0 g/dL    RDW 17.0 (H) 11.5 - 14.5 %    Platelets 219 150 - 450  x10*3/uL   Basic Metabolic Panel   Result Value Ref Range    Glucose 77 74 - 99 mg/dL    Sodium 132 (L) 136 - 145 mmol/L    Potassium 3.9 3.5 - 5.3 mmol/L    Chloride 96 (L) 98 - 107 mmol/L    Bicarbonate 31 21 - 32 mmol/L    Anion Gap 9 (L) 10 - 20 mmol/L    Urea Nitrogen 11 6 - 23 mg/dL    Creatinine 0.96 0.50 - 1.30 mg/dL    eGFR >90 >60 mL/min/1.73m*2    Calcium 7.2 (L) 8.6 - 10.3 mg/dL   POCT GLUCOSE   Result Value Ref Range    POCT Glucose 144 (H) 74 - 99 mg/dL   Comprehensive metabolic panel   Result Value Ref Range    Glucose 99 74 - 99 mg/dL    Sodium 128 (L) 136 - 145 mmol/L    Potassium 4.0 3.5 - 5.3 mmol/L    Chloride 93 (L) 98 - 107 mmol/L    Bicarbonate 30 21 - 32 mmol/L    Anion Gap 9 (L) 10 - 20 mmol/L    Urea Nitrogen 16 6 - 23 mg/dL    Creatinine 1.16 0.50 - 1.30 mg/dL    eGFR 76 >60 mL/min/1.73m*2    Calcium 7.6 (L) 8.6 - 10.3 mg/dL    Albumin 2.4 (L) 3.4 - 5.0 g/dL    Alkaline Phosphatase 176 (H) 33 - 120 U/L    Total Protein 4.8 (L) 6.4 - 8.2 g/dL     (H) 9 - 39 U/L    Bilirubin, Total 9.3 (H) 0.0 - 1.2 mg/dL     (H) 10 - 52 U/L   CBC and Auto Differential   Result Value Ref Range    WBC 8.2 4.4 - 11.3 x10*3/uL    nRBC 1.6 (H) 0.0 - 0.0 /100 WBCs    RBC 3.14 (L) 4.50 - 5.90 x10*6/uL    Hemoglobin 10.5 (L) 13.5 - 17.5 g/dL    Hematocrit 30.3 (L) 41.0 - 52.0 %    MCV 97 80 - 100 fL    MCH 33.4 26.0 - 34.0 pg    MCHC 34.7 32.0 - 36.0 g/dL    RDW 17.5 (H) 11.5 - 14.5 %    Platelets 236 150 - 450 x10*3/uL    Immature Granulocytes %, Automated 11.7 (H) 0.0 - 0.9 %    Immature Granulocytes Absolute, Automated 0.96 (H) 0.00 - 0.70 x10*3/uL   Manual Differential   Result Value Ref Range    Neutrophils %, Manual 41.0 40.0 - 80.0 %    Bands %, Manual 6.0 0.0 - 5.0 %    Lymphocytes %, Manual 23.0 13.0 - 44.0 %    Monocytes %, Manual 20.0 2.0 - 10.0 %    Eosinophils %, Manual 0.0 0.0 - 6.0 %    Basophils %, Manual 0.0 0.0 - 2.0 %    Metamyelocytes %, Manual 10.0 0.0 - 0.0 %    Seg Neutrophils  Absolute, Manual 3.36 1.20 - 7.00 x10*3/uL    Bands Absolute, Manual 0.49 0.00 - 0.70 x10*3/uL    Lymphocytes Absolute, Manual 1.89 1.20 - 4.80 x10*3/uL    Monocytes Absolute, Manual 1.64 (H) 0.10 - 1.00 x10*3/uL    Eosinophils Absolute, Manual 0.00 0.00 - 0.70 x10*3/uL    Basophils Absolute, Manual 0.00 0.00 - 0.10 x10*3/uL    Metamyelocytes Absolute, Manual 0.82 0.00 - 0.00 x10*3/uL    Total Cells Counted 100     Neutrophils Absolute, Manual 3.85 1.20 - 7.70 x10*3/uL    RBC Morphology See Below     Polychromasia Mild     Target Cells Few          Imaging:  XR chest 1 view    Result Date: 1/12/2024  Interpreted By:  Maritza Solis, STUDY: XR CHEST 1 VIEW;  1/12/2024 2:14 am   INDICATION: Signs/Symptoms:hypoxemia.   COMPARISON: 02/04/2021   ACCESSION NUMBER(S): [id number]   ORDERING CLINICIAN: ORION LIANG   FINDINGS:     CARDIOMEDIASTINAL SILHOUETTE: Cardiomediastinal silhouette is normal in size and configuration.   LUNGS: No pulmonary consolidation, pleural effusion or pneumothorax.   ABDOMEN: No remarkable upper abdominal findings.   BONES: No acute osseous abnormality.       No acute cardiopulmonary process.   MACRO: None   Signed by: Maritza Solis 1/12/2024 3:08 AM Dictation workstation:   [id number]    ECG 12 lead    Result Date: 1/10/2024  Sinus rhythm Probable left atrial enlargement Nonspecific intraventricular conduction delay Borderline repolarization abnormality See ED provider note for full interpretation and clinical correlation Confirmed by Dutch Weston ([id number]) on 1/10/2024 2:07:13 PM    CT head wo IV contrast    Result Date: 1/10/2024  Interpreted By:  Dipesh Hallman, STUDY: CT HEAD WO IV CONTRAST;  1/10/2024 11:57 am   INDICATION: Signs/Symptoms:Encephalopathy, severe hyponatremia.   COMPARISON: Comparison study is from 03/06/2011..   ACCESSION NUMBER(S): [id number]   ORDERING CLINICIAN: ALO BELLE   TECHNIQUE: Routine axial images were obtained from the skull base through the  vertex.  Sagittal and coronal reconstruction images were generated. Brain, subdural, and bone windows were reviewed.   FINDINGS: INTRACRANIAL: Mild prominence of ventricles and sulci. There is mild patchy hypodensity throughout the deep periventricular white matter. No acute intracranial bleed, midline shift, or focal mass effect. No destructive bone lesion. No depressed skull fracture.     EXTRACRANIAL: Moderate fluid in the right maxillary sinus. Small retention cyst in the left maxillary sinus. Ethmoid, frontal, and sphenoid sinuses were clear. Visualized mastoid air cells were clear.       No acute intracranial bleed or focal mass effect.   Mild volume loss.   Mild chronic white matter ischemic disease in the deep periventricular regions.   Acute right maxillary sinusitis. Small left maxillary sinus retention cyst.   MACRO: None   Signed by: Dipesh Hallman 1/10/2024 12:27 PM Dictation workstation:   [id number]    US liver with doppler    Result Date: 1/10/2024  Interpreted By:  Dipesh Hallman, STUDY: US LIVER WITH DOPPLER  1/10/2024 11:54 am   INDICATION: 54 y/o   M with  Signs/Symptoms:liver disease; perform with liver doppler.   COMPARISON: Correlation with CT scan from 01/09/2024. Correlation with renal ultrasound from 03/06/2011.   ACCESSION NUMBER(S): [id number]   ORDERING CLINICIAN: ANABELLE MADERA   TECHNIQUE: Ultrasound of the abdomen was performed using grayscale imaging, color Doppler, and spectral Doppler.   The patient was in restraints and was combative. This did limit the exam somewhat.   FINDINGS: LIVER: Cranialcaudal length:  19.7cm, enlarged. Echogenicity:  Diffusely increased. Smooth liver margins. Mass: None.   GALLBLADDER: No shadowing stone, gallbladder wall thickening, adjacent edema, or sonographic Bergman sign.   BILE DUCTS: No intrahepatic biliary ductal dilatation. Common bile duct measured 4 mm in diameter. This is within the limits of normal.   PANCREAS: The pancreas was obscured by  bowel gas..   PERITONEAL FLUID: No ascites.   SPLEEN: Measures  11.7cm in craniocaudal dimension, which is within normal limits.  No focal splenic lesion identified.   RIGHT KIDNEY: The right kidney measured  12.0 cm in length. It  was sonographically normal for size and echogenicity. There was no shadowing stone, hydronephrosis, or perinephric collection.   LEFT KIDNEY: The left  kidney measured  11.3 cm in length. It was sonographically normal for size and echogenicity. There was no shadowing stone, hydronephrosis, or perinephric collection. Upper pole parapelvic cyst measures 38 x 21 x 31 mm. This is grossly stable.   IVC AND ABDOMINAL AORTA: Not well seen.   HEPATIC ARTERIES: The following demonstrate patency and appropriate direction of flow: Main hepatic artery RI  0.82 which is mildly elevated; Right hepatic artery was not well seen; Left hepatic artery was not well seen;   SPLENIC VEIN: Splenic vein was not well seen   HEPATIC AND PORTAL VENOUS BRANCHES: Right, middle and left hepatic veins were not well seen in this exam. Main portal vein measured  12 mm in diameter. Main portal vein flow was at 10.2   cm/sec. Direction of flow was grossly normal. The left portal vein was not well seen. Anterior branch and posterior branch of the right portal vein were reasonably well seen. Direction of flow was grossly normal. No recanalized periumbilical vein or splenorenal shunt identified.       The patient was in restraints and combative. This did result in a suboptimal exam. Because of this, the right and left hepatic arteries, splenic vein, middle, right, and left hepatic veins, and left portal vein were not well seen. Also, the pancreas was not well seen. Finally, the abdominal aorta and IVC were not well seen.   Blood flow in the main portal vein and the right portal veins was grossly normal in direction.   Main hepatic artery resistive index was mildly elevated. Significance of this is uncertain.   Hepatomegaly.   Nonspecific increased echogenicity throughout the liver, most likely fatty infiltration. Liver margins were smooth. No focal hepatic mass in this limited exam.   No splenomegaly. No ascites.   Grossly stable upper pole parapelvic left renal cyst.       MACRO: None   Signed by: Dipesh Hallman 1/10/2024 12:25 PM Dictation workstation:   [id number]    CT abdomen pelvis w IV contrast    Result Date: 1/9/2024  STUDY: CT Abdomen and Pelvis with IV Contrast; 1/9/2024 at 4:47 PM. INDICATION: Abdominal pain, jaundice. COMPARISON: None available. ACCESSION NUMBER(S): [id number] ORDERING CLINICIAN: NURY HERNANDEZ TECHNIQUE: CT of the abdomen and pelvis was performed.  Contiguous axial images were obtained at 3 mm slice thickness through the abdomen and pelvis. Coronal and sagittal reconstructions at 3 mm slice thickness were performed.  Omnipaque 350 100 mL was administered intravenously.  FINDINGS: LOWER CHEST: No cardiomegaly.  No pericardial effusion.  Lung bases are clear.  ABDOMEN:  LIVER: No hepatomegaly.  Smooth surface contour.  Severe fatty infiltration of the liver.  BILE DUCTS: No intrahepatic or extrahepatic biliary ductal dilatation.  GALLBLADDER: The gallbladder is present without gallstones. STOMACH: Mild gastric wall thickening. PANCREAS: No masses or ductal dilatation.  SPLEEN: No splenomegaly or focal splenic lesion.  ADRENAL GLANDS: No thickening or nodules.  KIDNEYS AND URETERS: Kidneys are normal in size and location.  No renal or ureteral calculi.  Renal cysts measuring up to 3 cm on the left.  PELVIS:  BLADDER: No abnormalities identified.  REPRODUCTIVE ORGANS: No abnormalities identified.  BOWEL: Mildly dilated fluid-filled loops of small bowel centrally measuring up to 3.5 cm without a sharp transition point  VESSELS: No abnormalities identified.  Abdominal aorta is normal in caliber.  PERITONEUM/RETROPERITONEUM/LYMPH NODES: No free fluid.  No pneumoperitoneum. No lymphadenopathy.  ABDOMINAL WALL:  No abnormalities identified. SOFT TISSUES: No abnormalities identified.  BONES: No acute fracture or aggressive osseous lesion.    1. Mild gastric wall thickening.  Correlate clinically for gastritis. 2. Mildly dilated fluid-filled loops of small bowel centrally measuring up to 3.5 cm without a sharp transition point. Findings suggestive of a mild ileus.  If symptoms do not improve/resolve, recommend repeat CT with oral contrast to assess transit of contrast through the dilated small bowel loops. 3. Severe hepatic steatosis.  No other CT findings to account for the patient's reported jaundice.  No biliary dilatation. Signed by Luis Bhardwaj MD

## 2024-01-14 NOTE — CARE PLAN
The patient's goals for the shift include      The clinical goals for the shift include normal electrolyte levels    Over the shift, the patient did not make progress toward the following goals. Barriers to progression include n/a. Recommendations to address these barriers include n/a.

## 2024-01-15 LAB
ALBUMIN SERPL BCP-MCNC: 2.2 G/DL (ref 3.4–5)
ALP SERPL-CCNC: 171 U/L (ref 33–120)
ALT SERPL W P-5'-P-CCNC: 108 U/L (ref 10–52)
ANION GAP SERPL CALC-SCNC: 11 MMOL/L (ref 10–20)
AST SERPL W P-5'-P-CCNC: 130 U/L (ref 9–39)
BILIRUB SERPL-MCNC: 7.7 MG/DL (ref 0–1.2)
BUN SERPL-MCNC: 15 MG/DL (ref 6–23)
CALCIUM SERPL-MCNC: 7.4 MG/DL (ref 8.6–10.3)
CHLORIDE SERPL-SCNC: 98 MMOL/L (ref 98–107)
CO2 SERPL-SCNC: 29 MMOL/L (ref 21–32)
CREAT SERPL-MCNC: 1.07 MG/DL (ref 0.5–1.3)
EGFRCR SERPLBLD CKD-EPI 2021: 83 ML/MIN/1.73M*2
ERYTHROCYTE [DISTWIDTH] IN BLOOD BY AUTOMATED COUNT: 18.1 % (ref 11.5–14.5)
GLUCOSE BLD MANUAL STRIP-MCNC: 100 MG/DL (ref 74–99)
GLUCOSE BLD MANUAL STRIP-MCNC: 102 MG/DL (ref 74–99)
GLUCOSE BLD MANUAL STRIP-MCNC: 104 MG/DL (ref 74–99)
GLUCOSE BLD MANUAL STRIP-MCNC: 105 MG/DL (ref 74–99)
GLUCOSE SERPL-MCNC: 92 MG/DL (ref 74–99)
HCT VFR BLD AUTO: 30.2 % (ref 41–52)
HGB BLD-MCNC: 10.2 G/DL (ref 13.5–17.5)
INR PPP: 1 (ref 0.9–1.1)
MCH RBC QN AUTO: 33.3 PG (ref 26–34)
MCHC RBC AUTO-ENTMCNC: 33.8 G/DL (ref 32–36)
MCV RBC AUTO: 99 FL (ref 80–100)
NRBC BLD-RTO: 0.7 /100 WBCS (ref 0–0)
PLATELET # BLD AUTO: 245 X10*3/UL (ref 150–450)
POTASSIUM SERPL-SCNC: 3.7 MMOL/L (ref 3.5–5.3)
PROT SERPL-MCNC: 4.7 G/DL (ref 6.4–8.2)
PROTHROMBIN TIME: 11.6 SECONDS (ref 9.8–12.8)
RBC # BLD AUTO: 3.06 X10*6/UL (ref 4.5–5.9)
SMOOTH MUSCLE AB SER QL IF: ABNORMAL
SODIUM SERPL-SCNC: 134 MMOL/L (ref 136–145)
WBC # BLD AUTO: 10.4 X10*3/UL (ref 4.4–11.3)

## 2024-01-15 PROCEDURE — 36415 COLL VENOUS BLD VENIPUNCTURE: CPT | Performed by: INTERNAL MEDICINE

## 2024-01-15 PROCEDURE — 99232 SBSQ HOSP IP/OBS MODERATE 35: CPT | Performed by: PHYSICIAN ASSISTANT

## 2024-01-15 PROCEDURE — 1100000001 HC PRIVATE ROOM DAILY

## 2024-01-15 PROCEDURE — 97112 NEUROMUSCULAR REEDUCATION: CPT | Mod: GP,CQ | Performed by: PHYSICAL THERAPY ASSISTANT

## 2024-01-15 PROCEDURE — 2500000001 HC RX 250 WO HCPCS SELF ADMINISTERED DRUGS (ALT 637 FOR MEDICARE OP): Performed by: INTERNAL MEDICINE

## 2024-01-15 PROCEDURE — 36415 COLL VENOUS BLD VENIPUNCTURE: CPT | Performed by: PHYSICIAN ASSISTANT

## 2024-01-15 PROCEDURE — 92526 ORAL FUNCTION THERAPY: CPT | Mod: GN

## 2024-01-15 PROCEDURE — 84075 ASSAY ALKALINE PHOSPHATASE: CPT | Performed by: INTERNAL MEDICINE

## 2024-01-15 PROCEDURE — C9113 INJ PANTOPRAZOLE SODIUM, VIA: HCPCS | Performed by: INTERNAL MEDICINE

## 2024-01-15 PROCEDURE — 97116 GAIT TRAINING THERAPY: CPT | Mod: GP,CQ | Performed by: PHYSICAL THERAPY ASSISTANT

## 2024-01-15 PROCEDURE — 97530 THERAPEUTIC ACTIVITIES: CPT | Mod: GP,CQ | Performed by: PHYSICAL THERAPY ASSISTANT

## 2024-01-15 PROCEDURE — 2500000004 HC RX 250 GENERAL PHARMACY W/ HCPCS (ALT 636 FOR OP/ED): Performed by: INTERNAL MEDICINE

## 2024-01-15 PROCEDURE — 85610 PROTHROMBIN TIME: CPT | Performed by: PHYSICIAN ASSISTANT

## 2024-01-15 PROCEDURE — 82947 ASSAY GLUCOSE BLOOD QUANT: CPT

## 2024-01-15 PROCEDURE — 85027 COMPLETE CBC AUTOMATED: CPT | Performed by: INTERNAL MEDICINE

## 2024-01-15 PROCEDURE — 99233 SBSQ HOSP IP/OBS HIGH 50: CPT | Performed by: INTERNAL MEDICINE

## 2024-01-15 RX ADMIN — THIAMINE HCL TAB 100 MG 100 MG: 100 TAB at 08:21

## 2024-01-15 RX ADMIN — Medication 1 TABLET: at 08:21

## 2024-01-15 RX ADMIN — METOPROLOL SUCCINATE 50 MG: 50 TABLET, EXTENDED RELEASE ORAL at 08:20

## 2024-01-15 RX ADMIN — PANTOPRAZOLE SODIUM 40 MG: 40 INJECTION, POWDER, FOR SOLUTION INTRAVENOUS at 08:21

## 2024-01-15 RX ADMIN — FOLIC ACID 1 MG: 1 TABLET ORAL at 08:20

## 2024-01-15 ASSESSMENT — LIFESTYLE VARIABLES
AGITATION: NORMAL ACTIVITY
ANXIETY: NO ANXIETY, AT EASE
TOTAL SCORE: 1
ANXIETY: NO ANXIETY, AT EASE
TOTAL SCORE: 0
VISUAL DISTURBANCES: NOT PRESENT
AUDITORY DISTURBANCES: NOT PRESENT
PAROXYSMAL SWEATS: NO SWEAT VISIBLE
ORIENTATION AND CLOUDING OF SENSORIUM: ORIENTED AND CAN DO SERIAL ADDITIONS
AUDITORY DISTURBANCES: NOT PRESENT
TOTAL SCORE: 1
AGITATION: NORMAL ACTIVITY
ANXIETY: NO ANXIETY, AT EASE
ORIENTATION AND CLOUDING OF SENSORIUM: ORIENTED AND CAN DO SERIAL ADDITIONS
HEADACHE, FULLNESS IN HEAD: NOT PRESENT
PAROXYSMAL SWEATS: NO SWEAT VISIBLE
HEADACHE, FULLNESS IN HEAD: NOT PRESENT
AGITATION: NORMAL ACTIVITY
TREMOR: NO TREMOR
AUDITORY DISTURBANCES: NOT PRESENT
NAUSEA AND VOMITING: NO NAUSEA AND NO VOMITING
HEADACHE, FULLNESS IN HEAD: NOT PRESENT
VISUAL DISTURBANCES: NOT PRESENT
ORIENTATION AND CLOUDING OF SENSORIUM: ORIENTED AND CAN DO SERIAL ADDITIONS
TREMOR: NOT VISIBLE, BUT CAN BE FELT FINGERTIP TO FINGERTIP
TREMOR: NOT VISIBLE, BUT CAN BE FELT FINGERTIP TO FINGERTIP
VISUAL DISTURBANCES: NOT PRESENT
NAUSEA AND VOMITING: NO NAUSEA AND NO VOMITING
NAUSEA AND VOMITING: NO NAUSEA AND NO VOMITING
PAROXYSMAL SWEATS: NO SWEAT VISIBLE

## 2024-01-15 ASSESSMENT — COGNITIVE AND FUNCTIONAL STATUS - GENERAL
DRESSING REGULAR UPPER BODY CLOTHING: A LOT
EATING MEALS: A LITTLE
CLIMB 3 TO 5 STEPS WITH RAILING: TOTAL
DAILY ACTIVITIY SCORE: 13
MOBILITY SCORE: 12
DAILY ACTIVITIY SCORE: 13
PERSONAL GROOMING: A LOT
MOBILITY SCORE: 11
DRESSING REGULAR UPPER BODY CLOTHING: A LOT
CLIMB 3 TO 5 STEPS WITH RAILING: TOTAL
HELP NEEDED FOR BATHING: A LOT
EATING MEALS: A LITTLE
DRESSING REGULAR LOWER BODY CLOTHING: A LOT
MOVING FROM LYING ON BACK TO SITTING ON SIDE OF FLAT BED WITH BEDRAILS: A LOT
CLIMB 3 TO 5 STEPS WITH RAILING: A LOT
MOVING TO AND FROM BED TO CHAIR: A LOT
TURNING FROM BACK TO SIDE WHILE IN FLAT BAD: A LOT
MOBILITY SCORE: 11
TOILETING: A LOT
PERSONAL GROOMING: A LOT
DRESSING REGULAR LOWER BODY CLOTHING: A LOT
WALKING IN HOSPITAL ROOM: A LOT
MOVING FROM LYING ON BACK TO SITTING ON SIDE OF FLAT BED WITH BEDRAILS: A LOT
WALKING IN HOSPITAL ROOM: A LOT
TURNING FROM BACK TO SIDE WHILE IN FLAT BAD: A LOT
STANDING UP FROM CHAIR USING ARMS: A LOT
TURNING FROM BACK TO SIDE WHILE IN FLAT BAD: A LOT
WALKING IN HOSPITAL ROOM: A LOT
MOVING TO AND FROM BED TO CHAIR: A LOT
MOVING TO AND FROM BED TO CHAIR: A LOT
TOILETING: A LOT
MOVING FROM LYING ON BACK TO SITTING ON SIDE OF FLAT BED WITH BEDRAILS: A LOT
STANDING UP FROM CHAIR USING ARMS: A LOT
HELP NEEDED FOR BATHING: A LOT
STANDING UP FROM CHAIR USING ARMS: A LOT

## 2024-01-15 ASSESSMENT — PAIN - FUNCTIONAL ASSESSMENT
PAIN_FUNCTIONAL_ASSESSMENT: 0-10
PAIN_FUNCTIONAL_ASSESSMENT: 0-10

## 2024-01-15 ASSESSMENT — PAIN SCALES - GENERAL
PAINLEVEL_OUTOF10: 0 - NO PAIN
PAINLEVEL_OUTOF10: 0 - NO PAIN

## 2024-01-15 ASSESSMENT — ACTIVITIES OF DAILY LIVING (ADL): LACK_OF_TRANSPORTATION: PATIENT UNABLE TO ANSWER

## 2024-01-15 NOTE — PROGRESS NOTES
Selvin Ochoa is a 53 y.o. male on day 5 of admission presenting with Hyponatremia.    Subjective   Selvin Ochoa is a 53 y.o. male with a PMH of HTN, HLD and EtOH use disorder presenting with weakness.      He reports progressive weakness for the slat three days. The patient reports that he has not been able to keep any solid food down the past 3 days as well.  He has been able to maintain fluids.  The patient drinks 8-10 tall boys per day.  He states that he has cut down.  The patient has had seizure activity when he has tried to quit drinking.  The patient denies any fevers, chills, night cough, or diarrhea.  He reports normal urination.  He noted yesterday that his eyes and chest were turning yellow.  The patient denies any diagnosis of cirrhosis or liver dysfunction.  The patient reports that his last drink was last night but then changed and stated that his last drink was this morning.  He denies any contacts.  He is not having any chest pain, shortness of breath, dizziness, palpitations, paresthesias, focal weakness.  Denies any abdominal pain.       1/10: Patient is now in barb EtOH withdrawal. He required phenobarbital this AM.   1/11: Patient was seen and examined.  Continues to be in alcohol withdrawal.  Sleeping quietly when I saw after getting phenobarb this morning.  Requiring 2 L nasal cannula oxygen to maintain saturation.  Hyponatremia improving with sodium of 119 this morning.  Continue IV LR for now.  Nephrologist on board.  1/12:Patient was seen and examined. Still drowsy but arousable and confused. Saturating well on room air today. Hyponatremia continues to improve 125 today.    Bilirubin slightly elevated today with AST ALT trending down.  Discussed with patient's brother at bedside.  GI and nephrologist recommendations appreciated.  Continue to trend CMP daily.  1/13: Patient was seen and examined.  More awake and interactive today.  Sodium is improved to 132 today.  Bilirubin still elevated  "but AST ALT trending down.  Trend CMP daily.  Potential discharge extended-care facility within 48 to 72 hours.    1/14: Patient was seen and examined.  Sodium is now 128 today which could be around his baseline in the setting of advancing liver disease.  AST ALT and bilirubin trending down.  1/15: Patient was seen and examined.  Sodium is now 134.  AST ALT AST ALT and bilirubin continue to trend down.  GI signed off.  Awaiting placement in extended-care facility.      Objective     Physical Exam  Constitutional:       General: He is in acute distress.      Appearance: He is ill-appearing and diaphoretic.   HENT:      Head: Normocephalic and atraumatic.      Mouth/Throat:      Mouth: Mucous membranes are dry.   Eyes:      Extraocular Movements: Extraocular movements intact.      Pupils: Pupils are equal, round, and reactive to light.   Cardiovascular:      Rate and Rhythm: Tachycardia present.      Heart sounds: No murmur heard.     No friction rub. No gallop.   Pulmonary:      Effort: Pulmonary effort is normal. No respiratory distress.      Breath sounds: Rales present. No wheezing or rhonchi.   Abdominal:      General: Abdomen is flat. There is no distension.      Palpations: Abdomen is soft.      Tenderness: There is no abdominal tenderness.   Musculoskeletal:         General: No swelling.   Skin:     General: Skin is warm.   Neurological:      Mental Status: He is disoriented.         Last Recorded Vitals  Blood pressure 114/74, pulse 68, temperature 36.4 °C (97.6 °F), temperature source Temporal, resp. rate 16, height 1.854 m (6' 1\"), weight 107 kg (236 lb 1.8 oz), SpO2 96 %.  Intake/Output last 3 Shifts:  I/O last 3 completed shifts:  In: 1790 (16.7 mL/kg) [P.O.:1790]  Out: 2070 (19.3 mL/kg) [Urine:1965 (0.5 mL/kg/hr); Stool:105]  Weight: 107.1 kg     Relevant Results                This patient has a urinary catheter   Reason for the urinary catheter remaining today? critically ill patient who need accurate " urinary output measurements               Assessment/Plan   Hypovolemic Hyponatremia (likely beer potomania as well)   -Na 113 on admit   -IV fluid LR for now  -Trend Na Q4, goal correction of 6 to 8 mEq/24hrs  -Continue fluid restriction  -Intensivist and nephrology on board     Alcohol Use Disorder with Risk for withdrawal   -he is now experiencing complex withdrawal   -Continue phenobarbital taper in addition to   -Continue lorazepam via the [facility] protocol.     Alcoholic Hepatitis   -DF 22.4 on admit, no indication for steroids at this time   -AST ALT and bilirubin are trending down  -Hepatitis panel negative  -trend CMP and INR   -additional labs per GI reviewed  -Imaging with hepatic steatosis   -GI signed off    Gastritis   -start PPI      Possible Ileus   -CLD for now   -Will consult surgery if he worsens       YOLANDA   -prerenal in the setting of above   -repleting volume      HTN  -holding home meds with normal BP      DVT ppx   -SCDs    I spent 30 minutes in the follow-up management of this patient    Tripp Justice MD

## 2024-01-15 NOTE — CARE PLAN
The patient's goals for the shift include          Problem: Skin  Goal: Participates in plan/prevention/treatment measures  1/15/2024 1115 by Nisha Vargas RN  Outcome: Progressing  Flowsheets (Taken 1/15/2024 1115)  Participates in plan/prevention/treatment measures: Discuss with provider PT/OT consult  1/15/2024 0750 by Nisha Vargas RN  Outcome: Progressing  Goal: Decreased wound size/increased tissue granulation at next dressing change  1/15/2024 1115 by Nisha Vargas RN  Outcome: Progressing  Flowsheets (Taken 1/15/2024 1115)  Decreased wound size/increased tissue granulation at next dressing change: Promote sleep for wound healing  1/15/2024 0750 by Nisha Vargas RN  Outcome: Progressing  Goal: Prevent/manage excess moisture  1/15/2024 1115 by Nisha Vargas RN  Outcome: Progressing  Flowsheets (Taken 1/15/2024 1115)  Prevent/manage excess moisture:   Cleanse incontinence/protect with barrier cream   Moisturize dry skin   Monitor for/manage infection if present  1/15/2024 0750 by Nisha Vargas RN  Outcome: Progressing  Goal: Prevent/minimize sheer/friction injuries  1/15/2024 1115 by Nisha Vargas RN  Outcome: Progressing  Flowsheets (Taken 1/15/2024 1115)  Prevent/minimize sheer/friction injuries:   Increase activity/out of bed for meals   Turn/reposition every 2 hours/use positioning/transfer devices   Use pull sheet  1/15/2024 0750 by Nisha Vargas RN  Outcome: Progressing  Goal: Promote/optimize nutrition  1/15/2024 1115 by Nisha Vargas RN  Outcome: Progressing  Flowsheets (Taken 1/15/2024 1115)  Promote/optimize nutrition:   Consume > 50% meals/supplements   Monitor/record intake including meals  1/15/2024 0750 by Nisha Vargas RN  Outcome: Progressing  Goal: Promote skin healing  1/15/2024 1115 by Nisha Vargas RN  Outcome: Progressing  Flowsheets (Taken 1/15/2024 1115)  Promote skin healing:   Assess  skin/pad under line(s)/device(s)   Ensure correct size (line/device) and apply per  instructions   Protective dressings over bony prominences   Rotate device position/do not position patient on device   Turn/reposition every 2 hours/use positioning/transfer devices  1/15/2024 0750 by Nisha Vargas RN  Outcome: Progressing

## 2024-01-15 NOTE — PROGRESS NOTES
"        Nephrology Progress Note      Nephrology following for hypopatient's natremia.   Events over night: none  Patient able to converse well today. No complaints at this time.    /72 (BP Location: Left arm, Patient Position: Lying)   Pulse 67   Temp 36.6 °C (97.8 °F) (Temporal)   Resp 16   Ht 1.854 m (6' 1\")   Wt 107 kg (236 lb 1.8 oz)   SpO2 96%   BMI 31.15 kg/m²     Input / Output:  24 HR:   Intake/Output Summary (Last 24 hours) at 1/15/2024 0903  Last data filed at 1/15/2024 0903  Gross per 24 hour   Intake 1554 ml   Output 1620 ml   Net -66 ml         Physical Exam   Alert and oriented x 3 NAD  Neck: no JVD  CV: RRR  Lungs: CTA bilaterally  Abd: soft, NT, ND   Ext: trace lower extremity edema    Scheduled medications  folic acid, 1 mg, oral, Daily  metoprolol succinate XL, 50 mg, oral, Daily  multivitamin with minerals, 1 tablet, oral, Daily  pantoprazole, 40 mg, intravenous, Daily  thiamine, 100 mg, oral, Daily      Continuous medications     PRN medications  PRN medications: dextrose 10 % in water (D10W), dextrose, glucagon, LORazepam **OR** LORazepam **OR** LORazepam, ondansetron, oxygen   Results from last 7 days   Lab Units 01/15/24  0343   SODIUM mmol/L 134*   POTASSIUM mmol/L 3.7   CHLORIDE mmol/L 98   CO2 mmol/L 29   BUN mg/dL 15   CREATININE mg/dL 1.07   CALCIUM mg/dL 7.4*   PROTEIN TOTAL g/dL 4.7*   BILIRUBIN TOTAL mg/dL 7.7*   ALK PHOS U/L 171*   ALT U/L 108*   AST U/L 130*   GLUCOSE mg/dL 92        Results from last 7 days   Lab Units 01/12/24  0531 01/11/24  0624 01/10/24  0428   MAGNESIUM mg/dL 2.03 2.01 1.35*        Results from last 7 days   Lab Units 01/15/24  0343 01/14/24  0354 01/13/24  0505   WBC AUTO x10*3/uL 10.4 8.2 7.7   HEMOGLOBIN g/dL 10.2* 10.5* 10.7*   HEMATOCRIT % 30.2* 30.3* 29.9*   PLATELETS AUTO x10*3/uL 245 236 219          Assessment & Plan:     Patient is 53 y.o. male with PMHx of AUD, HTN and HLD presented to the ED on 1/9/24 with complaints of progressive " weakness is admitted to hospital for alcoholic hepatitis and hyponatremia. Nephrology consulted in view of hyponatremia.     Hypotonic Hyponatremia  -Suspect chronic issue with baseline serum sodium in the high 120s  -Beer Potomania but he was having poor intake last week with nausea and vomiting  -Na was 113 on admission  -patient received a bolus of NS on 1/9  -started on  mL/hr which ran for approximately 4 hours  -given D5W for the following 7 hours, infusion stopped at 0100 on 1/10/24  -LR set at 75 mL/hr was started yesterday and serum sodium up to 119  -Level did start to drop again off LR now 118   -Sosm 241,  Uosm 504 Mary < 10  -hypovolemic hyponatremia but this is also could be the acute liver injury playing a role  -Serum sodium corrected to 134 appropriately over admission    Hypomagnesemia   -1.35 on 1/10/24  -Now 2.03     Hypophosphatemia  -in the setting of alcohol use disorder and decreased food intake  -improved     Recommendations:   -q24 hours sodium checks  -1500 mL fluid restriction    Please message me through [employer] chat with any questions or concerns.     Bernard Banegas  1/15/2024  9:03 AM     [facility]    [address], [address]   [place] [address]  Office: [phone]

## 2024-01-15 NOTE — PROGRESS NOTES
"Selvin Ochoa is a 53 y.o. male on day 5 of admission presenting with Hyponatremia.    Subjective   Patient seen and examined. This morning, he is awake and alert. He denies abdominal pain, N/V. +BM without bleeding. Sodium improved.    10 point ROS obtained and negative other than discussed above.       Objective     Physical Exam  Vitals reviewed.   Constitutional:       General: He is not in acute distress.     Appearance: Normal appearance.   Cardiovascular:      Rate and Rhythm: Normal rate and regular rhythm.   Pulmonary:      Effort: Pulmonary effort is normal.      Breath sounds: Normal breath sounds.   Abdominal:      General: Bowel sounds are normal. There is no distension.      Palpations: Abdomen is soft.      Tenderness: There is no abdominal tenderness. There is no guarding or rebound.   Skin:     Coloration: Skin is jaundiced.   Neurological:      General: No focal deficit present.      Mental Status: He is alert and oriented to person, place, and time.   Psychiatric:         Mood and Affect: Mood normal.         Behavior: Behavior normal.         Last Recorded Vitals  Blood pressure 114/74, pulse 68, temperature 36.4 °C (97.6 °F), temperature source Temporal, resp. rate 16, height 1.854 m (6' 1\"), weight 107 kg (236 lb 1.8 oz), SpO2 97 %.  Intake/Output last 3 Shifts:  I/O last 3 completed shifts:  In: 1790 (16.7 mL/kg) [P.O.:1790]  Out: 2070 (19.3 mL/kg) [Urine:1965 (0.5 mL/kg/hr); Stool:105]  Weight: 107.1 kg     Relevant Results      Scheduled medications  folic acid, 1 mg, oral, Daily  metoprolol succinate XL, 50 mg, oral, Daily  multivitamin with minerals, 1 tablet, oral, Daily  pantoprazole, 40 mg, intravenous, Daily  thiamine, 100 mg, oral, Daily      Continuous medications     PRN medications  PRN medications: dextrose 10 % in water (D10W), dextrose, glucagon, LORazepam **OR** LORazepam **OR** LORazepam, ondansetron, oxygen    Results for orders placed or performed during the hospital " "encounter of 01/09/24 (from the past 24 hour(s))   POCT GLUCOSE   Result Value Ref Range    POCT Glucose 147 (H) 74 - 99 mg/dL   Comprehensive metabolic panel   Result Value Ref Range    Glucose 92 74 - 99 mg/dL    Sodium 134 (L) 136 - 145 mmol/L    Potassium 3.7 3.5 - 5.3 mmol/L    Chloride 98 98 - 107 mmol/L    Bicarbonate 29 21 - 32 mmol/L    Anion Gap 11 10 - 20 mmol/L    Urea Nitrogen 15 6 - 23 mg/dL    Creatinine 1.07 0.50 - 1.30 mg/dL    eGFR 83 >60 mL/min/1.73m*2    Calcium 7.4 (L) 8.6 - 10.3 mg/dL    Albumin 2.2 (L) 3.4 - 5.0 g/dL    Alkaline Phosphatase 171 (H) 33 - 120 U/L    Total Protein 4.7 (L) 6.4 - 8.2 g/dL     (H) 9 - 39 U/L    Bilirubin, Total 7.7 (H) 0.0 - 1.2 mg/dL     (H) 10 - 52 U/L   CBC   Result Value Ref Range    WBC 10.4 4.4 - 11.3 x10*3/uL    nRBC 0.7 (H) 0.0 - 0.0 /100 WBCs    RBC 3.06 (L) 4.50 - 5.90 x10*6/uL    Hemoglobin 10.2 (L) 13.5 - 17.5 g/dL    Hematocrit 30.2 (L) 41.0 - 52.0 %    MCV 99 80 - 100 fL    MCH 33.3 26.0 - 34.0 pg    MCHC 33.8 32.0 - 36.0 g/dL    RDW 18.1 (H) 11.5 - 14.5 %    Platelets 245 150 - 450 x10*3/uL   POCT GLUCOSE   Result Value Ref Range    POCT Glucose 105 (H) 74 - 99 mg/dL   Protime-INR   Result Value Ref Range    Protime 11.6 9.8 - 12.8 seconds    INR 1.0 0.9 - 1.1              Assessment/Plan   Principal Problem:    Hyponatremia    Selvin Ochoa is a 53 y.o. male with a significant past medical history of hypertension, hyperlipidemia and EtOH abuse who presented with weakness. GI was consulted for \" alcohol hepatitis\".        Etoh Hepatitis   DF 22 from time of admission indicating good prognosis with no current indication for prednisolone. LFTs trending down and ALT/AST in the range that would be expected for alcohol hepatitis. Acute viral hepatitis panel negative and additional labs to evaluate for other underlying causes of liver disease negative except for positive IDRIS at 1:80. Other labs for the evaluation of autoimmune liver disease " have been normal. Previous LFTs have been normal which would also make AIH less likely. No evidence of vascular process on US. Steatohepatitis on US likely the sequelae of alcohol abuse.  - Daily LFTs and INR while admitted       2. EtOH abuse  Actively drinking prior to admission. Strict abstinence is needed. Discussed in detail with patient again today.  - agree with monitoring for EtOH withdrawal and medicating based on CIWA scale  - continue with thiamine and folate  - abstinence is essential and social work should be involved to provide resources for quitting including AA         GI will sign off at this time, but please call with questions.      Case was reviewed with Dr. Rogers.        Luana Roca PA-C

## 2024-01-15 NOTE — PROGRESS NOTES
"Speech-Language Pathology    Inpatient  Speech-Language Pathology Treatment     Patient Name: Selvin Ochoa  MRN: [id number]  Today's Date: 1/15/2024  Time Calculation  Start Time: 1201  Stop Time: 1222  Time Calculation (min): 21 min         Current Problem:   1. Hyponatremia        2. Oropharyngeal dysphagia [R13.12]            SLP Assessment:  SLP TX Intervention Outcome: Making Progress Towards Goals  Treatment Tolerance: Patient tolerated treatment well       Plan:  Inpatient/Swing Bed or Outpatient: Inpatient  SLP TX Plan: Continue Plan of Care  SLP Plan: Skilled SLP  SLP Frequency: 2x per week  Duration: 2 weeks  Next Treatment Priority: Continue to assess tolerance of current diet to ensure safety with diet upgrade  SLP - OK to Discharge: Yes (when medically ready, per physician)      Subjective   Current Problem:  Pt is awake and alert, sitting upright in bed upon SLP arrival. Pt is agreeable to therapy. Pt is oriented x4.    Most Recent Visit:  SLP Received On: 01/15/24    General Visit Information:   Prior to Session Communication: Bedside nurse      Pain Assessment:   Pain Assessment: 0-10  Pain Score: 0 - No pain      Objective       Therapeutic Swallow:  Therapeutic Swallow Intervention : PO Trials  Solid Diet Recommendations: Regular (IDDSI Level 7)  Liquid Diet Recommendations: Thin (IDDSI Level 0)    Swallow Comments: Pt previously on diet of minced/moist solids and thin liquids. After pt's dentures were placed using adhesive, he consumed x3 bites of applesauce, x5 bites of pears (3-4 pears per bite), and x1 jacobo cracker in 2 bites. No overt s/sx of aspiration or penetration were observed with any consistency. Pt educated on SLP's recommendation for advancing diet to regular solids with use of dentures, as pt reports he is used to \"just gumming it\".     At this time, a MBSS is not recommended. Pt consumed all PO trials this date without over s/sx of aspiration or penetration, his most recent CXR " "(01/12/2024) results indicated \"No pulmonary consolidation, pleural effusion or pneumothorax\", and his most recent WBC count (01/15/2024) of 10.4 is WNL. SLP to follow to ensure safety and tolerance of upgraded diet.          "

## 2024-01-15 NOTE — CARE PLAN
The patient's goals for the shift include      Problem: Fall/Injury  Goal: Not fall by end of shift  Outcome: Progressing  Goal: Be free from injury by end of the shift  Outcome: Progressing  Goal: Verbalize understanding of personal risk factors for fall in the hospital  Outcome: Progressing  Goal: Verbalize understanding of risk factor reduction measures to prevent injury from fall in the home  Outcome: Progressing  Goal: Use assistive devices by end of the shift  Outcome: Progressing  Goal: Pace activities to prevent fatigue by end of the shift  Outcome: Progressing     Problem: Skin  Goal: Participates in plan/prevention/treatment measures  Outcome: Progressing  Goal: Decreased wound size/increased tissue granulation at next dressing change  Outcome: Progressing  Goal: Prevent/manage excess moisture  Outcome: Progressing  Goal: Prevent/minimize sheer/friction injuries  Outcome: Progressing  Goal: Promote/optimize nutrition  Outcome: Progressing  Goal: Promote skin healing  Outcome: Progressing     Problem: Nutrition  Goal: Oral intake greater than 50%  Outcome: Progressing  Goal: Lab values WNL  Outcome: Progressing  Goal: Electrolytes WNL  Outcome: Progressing     Problem: Hyponatremia  Goal: improvement in sodium by discharge  Outcome: Progressing

## 2024-01-15 NOTE — CARE PLAN
The patient's goals for the shift include      The clinical goals for the shift include pt will demonstrate improvement in liver labs by end of this shift.    Over the shift, the patient did not make progress toward the following goals. Barriers to progression include n/a. Recommendations to address these barriers include n/a.

## 2024-01-15 NOTE — PROGRESS NOTES
Physical Therapy    Physical Therapy Treatment    Patient Name: Selvin Ochoa  MRN: [id number]  Today's Date: 1/15/2024  Time Calculation  Start Time: 1616  Stop Time: 1654  Time Calculation (min): 38 min       Assessment/Plan   PT Assessment  End of Session Communication: PCT/NA/CTA, Bedside nurse  Assessment Comment: pt demonstrating good motivation and compliance with PT. HE is MOD ax1 with all transfers and gait. He would benefit from further skilled treatment to improve safety and functional mobility.  End of Session Patient Position:  (pt on BSC with PCA present.)     PT Plan  Treatment/Interventions: Bed mobility, Transfer training, Gait training, Endurance training, Strengthening  PT Plan: Skilled PT  PT Frequency: 4 times per week  PT Discharge Recommendations: Moderate intensity level of continued care  Equipment Recommended upon Discharge:  (TBD)  PT Recommended Transfer Status: Assist x2  PT - OK to Discharge: Yes (WHEN MEDICALLY CLEARED)      General Visit Information:   PT  Visit  PT Received On: 01/15/24  Response to Previous Treatment: Patient with no complaints from previous session.  General  Reason for Referral: IMPAIRED MOBILITY GAIT TRAINING  Referred By: ASHVIN  Past Medical History Relevant to Rehab: WEAK; DX: HYPONATREMIA, ETOH WD AND HEPATITIS, R/O ILEUS, YOLANDA, ENCEPHALOPATHY; HX: HTN ETOH  Prior to Session Communication: Bedside nurse  Patient Position Received: Alarm on, Up in chair  Preferred Learning Style: verbal  General Comment: pt agreeable to PT. pt reported he is feeling some better and that it feels good to get OOB      Precautions:  Precautions  Medical Precautions: Fall precautions, Seizure precautions  Precautions Comment: Falls                Cognition:  Cognition  Overall Cognitive Status: Within Functional Limits       Treatments:  Therapeutic Exercise  Therapeutic Exercise Performed: Yes (education provided for complete ROM and good technique)  Therapeutic Exercise Activity  1: ankle pumps x20  Therapeutic Exercise Activity 2: marches x15  Therapeutic Exercise Activity 3: LAQs x15  Therapeutic Exercise Activity 4: hip ab/adduction x 15    Balance/Neuromuscular Re-Education  Balance/Neuromuscular Re-Education Activity Performed: Yes  Balance/Neuromuscular Re-Education Activity 1: pt performed multiple stands ranging from 1>3 minutes with MIN Ax1 to maintain and cues for safety. pt with no overt LOB but is unsteady and impulsive which increased fall risk    Ambulation/Gait Training  Ambulation/Gait Training Performed: Yes  Ambulation/Gait Training 1  Surface 1: Level tile  Device 1: Rolling walker  Assistance 1: Moderate assistance  Quality of Gait 1: Decreased step length, Inconsistent stride length  Comments/Distance (ft) 1: 25'x2 with cues for walker safety and improved  gait pattern. pt is impulsive times which increases fall risk.  Transfers  Transfer: Yes  Transfer 1  Technique 1: Sit to stand, Stand to sit  Transfer Device 1: Walker  Transfer Level of Assistance 1: Moderate assistance  Trials/Comments 1: cues for proper hand placement and safety to decrease fall risk.  Transfers 2  Technique 2: Stand pivot  Transfer Device 2: Walker  Transfer Level of Assistance 2: Moderate assistance, Moderate verbal cues  Trials/Comments 2: cues fror improved walker safety and technique to decrease risk of falls.    Outcome Measures:  [facility] Basic Mobility  Turning from your back to your side while in a flat bed without using bedrails: A lot  Moving from lying on your back to sitting on the side of a flat bed without using bedrails: A lot  Moving to and from bed to chair (including a wheelchair): A lot  Standing up from a chair using your arms (e.g. wheelchair or bedside chair): A lot  To walk in hospital room: A lot  Climbing 3-5 steps with railing: Total  Basic Mobility - Total Score: 11         Encounter Problems       Encounter Problems (Active)       Mobility       STG - Patient will ambulate  (Progressing)       Start:  01/12/24    Expected End:  02/02/24       FWW MIN X2 75 FT         STRENGTHENING (Progressing)       Start:  01/12/24    Expected End:  02/02/24       20+ REPS EX INCREASING STRENGTH TO PROGRESS TO OOB ACTIVITIES            Transfers       STG - Transfer from bed to chair (Progressing)       Start:  01/12/24    Expected End:  01/26/24       FWW MIN X1-2A         STG - Patient to transfer to and from sit to supine (Progressing)       Start:  01/12/24    Expected End:  01/19/24       MIN A X1 USING RAILS HOB FLAT         STG - Patient will transfer sit to and from stand (Progressing)       Start:  01/12/24    Expected End:  01/22/24       FWW MIN 1-2 A USING PROPER TECHNIQUE

## 2024-01-15 NOTE — PROGRESS NOTES
Patient is recommended for MOD intensity therapies at discharge. He is currently listed as self-pay. SW did place a referral to [facility] last week and will follow up with [facility] for their determination.

## 2024-01-16 LAB — GLUCOSE BLD MANUAL STRIP-MCNC: 87 MG/DL (ref 74–99)

## 2024-01-16 PROCEDURE — 1100000001 HC PRIVATE ROOM DAILY

## 2024-01-16 PROCEDURE — 97116 GAIT TRAINING THERAPY: CPT | Mod: GP,CQ | Performed by: PHYSICAL THERAPY ASSISTANT

## 2024-01-16 PROCEDURE — 99233 SBSQ HOSP IP/OBS HIGH 50: CPT | Performed by: INTERNAL MEDICINE

## 2024-01-16 PROCEDURE — 82947 ASSAY GLUCOSE BLOOD QUANT: CPT

## 2024-01-16 PROCEDURE — 2500000004 HC RX 250 GENERAL PHARMACY W/ HCPCS (ALT 636 FOR OP/ED): Performed by: INTERNAL MEDICINE

## 2024-01-16 PROCEDURE — 2500000001 HC RX 250 WO HCPCS SELF ADMINISTERED DRUGS (ALT 637 FOR MEDICARE OP): Performed by: INTERNAL MEDICINE

## 2024-01-16 PROCEDURE — C9113 INJ PANTOPRAZOLE SODIUM, VIA: HCPCS | Performed by: INTERNAL MEDICINE

## 2024-01-16 PROCEDURE — 94760 N-INVAS EAR/PLS OXIMETRY 1: CPT

## 2024-01-16 PROCEDURE — 97530 THERAPEUTIC ACTIVITIES: CPT | Mod: GP,CQ | Performed by: PHYSICAL THERAPY ASSISTANT

## 2024-01-16 PROCEDURE — 97530 THERAPEUTIC ACTIVITIES: CPT | Mod: GO,CO

## 2024-01-16 PROCEDURE — 97535 SELF CARE MNGMENT TRAINING: CPT | Mod: GO,CO

## 2024-01-16 RX ADMIN — FOLIC ACID 1 MG: 1 TABLET ORAL at 08:50

## 2024-01-16 RX ADMIN — PANTOPRAZOLE SODIUM 40 MG: 40 INJECTION, POWDER, FOR SOLUTION INTRAVENOUS at 08:50

## 2024-01-16 RX ADMIN — Medication 1 TABLET: at 08:50

## 2024-01-16 RX ADMIN — THIAMINE HCL TAB 100 MG 100 MG: 100 TAB at 08:50

## 2024-01-16 RX ADMIN — METOPROLOL SUCCINATE 50 MG: 50 TABLET, EXTENDED RELEASE ORAL at 08:50

## 2024-01-16 ASSESSMENT — PAIN - FUNCTIONAL ASSESSMENT
PAIN_FUNCTIONAL_ASSESSMENT: 0-10
PAIN_FUNCTIONAL_ASSESSMENT: 0-10

## 2024-01-16 ASSESSMENT — COGNITIVE AND FUNCTIONAL STATUS - GENERAL
TURNING FROM BACK TO SIDE WHILE IN FLAT BAD: A LITTLE
EATING MEALS: A LITTLE
CLIMB 3 TO 5 STEPS WITH RAILING: TOTAL
STANDING UP FROM CHAIR USING ARMS: A LOT
HELP NEEDED FOR BATHING: A LOT
DRESSING REGULAR UPPER BODY CLOTHING: A LOT
DRESSING REGULAR UPPER BODY CLOTHING: A LOT
TURNING FROM BACK TO SIDE WHILE IN FLAT BAD: A LITTLE
TOILETING: A LOT
PERSONAL GROOMING: A LOT
DAILY ACTIVITIY SCORE: 14
MOBILITY SCORE: 15
MOBILITY SCORE: 14
STANDING UP FROM CHAIR USING ARMS: A LITTLE
MOVING TO AND FROM BED TO CHAIR: A LOT
DRESSING REGULAR LOWER BODY CLOTHING: A LOT
TOILETING: A LOT
DRESSING REGULAR LOWER BODY CLOTHING: A LOT
WALKING IN HOSPITAL ROOM: A LOT
HELP NEEDED FOR BATHING: A LOT
MOVING FROM LYING ON BACK TO SITTING ON SIDE OF FLAT BED WITH BEDRAILS: A LITTLE
DRESSING REGULAR UPPER BODY CLOTHING: A LOT
DRESSING REGULAR LOWER BODY CLOTHING: A LOT
WALKING IN HOSPITAL ROOM: A LOT
DAILY ACTIVITIY SCORE: 14
MOVING TO AND FROM BED TO CHAIR: A LITTLE
WALKING IN HOSPITAL ROOM: A LOT
PERSONAL GROOMING: A LITTLE
CLIMB 3 TO 5 STEPS WITH RAILING: TOTAL
HELP NEEDED FOR BATHING: A LOT
STANDING UP FROM CHAIR USING ARMS: A LOT
MOBILITY SCORE: 14
TOILETING: A LOT
CLIMB 3 TO 5 STEPS WITH RAILING: TOTAL
TURNING FROM BACK TO SIDE WHILE IN FLAT BAD: A LITTLE
MOVING TO AND FROM BED TO CHAIR: A LOT
PERSONAL GROOMING: A LOT
DAILY ACTIVITIY SCORE: 14

## 2024-01-16 ASSESSMENT — LIFESTYLE VARIABLES
AUDITORY DISTURBANCES: NOT PRESENT
ANXIETY: NO ANXIETY, AT EASE
PAROXYSMAL SWEATS: NO SWEAT VISIBLE
TOTAL SCORE: 1
AUDITORY DISTURBANCES: NOT PRESENT
ORIENTATION AND CLOUDING OF SENSORIUM: ORIENTED AND CAN DO SERIAL ADDITIONS
TOTAL SCORE: 0
ANXIETY: NO ANXIETY, AT EASE
NAUSEA AND VOMITING: NO NAUSEA AND NO VOMITING
AUDITORY DISTURBANCES: NOT PRESENT
HEADACHE, FULLNESS IN HEAD: NOT PRESENT
AGITATION: NORMAL ACTIVITY
ORIENTATION AND CLOUDING OF SENSORIUM: ORIENTED AND CAN DO SERIAL ADDITIONS
ORIENTATION AND CLOUDING OF SENSORIUM: ORIENTED AND CAN DO SERIAL ADDITIONS
TREMOR: NOT VISIBLE, BUT CAN BE FELT FINGERTIP TO FINGERTIP
HEADACHE, FULLNESS IN HEAD: NOT PRESENT
NAUSEA AND VOMITING: NO NAUSEA AND NO VOMITING
ORIENTATION AND CLOUDING OF SENSORIUM: ORIENTED AND CAN DO SERIAL ADDITIONS
PAROXYSMAL SWEATS: NO SWEAT VISIBLE
VISUAL DISTURBANCES: NOT PRESENT
PAROXYSMAL SWEATS: NO SWEAT VISIBLE
VISUAL DISTURBANCES: NOT PRESENT
AGITATION: NORMAL ACTIVITY
NAUSEA AND VOMITING: NO NAUSEA AND NO VOMITING
TOTAL SCORE: 0
VISUAL DISTURBANCES: NOT PRESENT
AGITATION: NORMAL ACTIVITY
TREMOR: NO TREMOR
NAUSEA AND VOMITING: NO NAUSEA AND NO VOMITING
AUDITORY DISTURBANCES: NOT PRESENT
AGITATION: NORMAL ACTIVITY
TREMOR: NO TREMOR
TOTAL SCORE: 0
HEADACHE, FULLNESS IN HEAD: NOT PRESENT
HEADACHE, FULLNESS IN HEAD: NOT PRESENT
ANXIETY: NO ANXIETY, AT EASE
VISUAL DISTURBANCES: NOT PRESENT
PAROXYSMAL SWEATS: NO SWEAT VISIBLE
ANXIETY: NO ANXIETY, AT EASE
TREMOR: NO TREMOR

## 2024-01-16 ASSESSMENT — ACTIVITIES OF DAILY LIVING (ADL): HOME_MANAGEMENT_TIME_ENTRY: 8

## 2024-01-16 ASSESSMENT — PAIN SCALES - GENERAL
PAINLEVEL_OUTOF10: 0 - NO PAIN

## 2024-01-16 NOTE — CARE PLAN
The patient's goals for the shift include      Problem: Fall/Injury  Goal: Not fall by end of shift  Outcome: Progressing  Goal: Be free from injury by end of the shift  Outcome: Progressing  Goal: Verbalize understanding of personal risk factors for fall in the hospital  Outcome: Progressing  Goal: Verbalize understanding of risk factor reduction measures to prevent injury from fall in the home  Outcome: Progressing  Goal: Use assistive devices by end of the shift  Outcome: Progressing  Goal: Pace activities to prevent fatigue by end of the shift  Outcome: Progressing     Problem: Skin  Goal: Participates in plan/prevention/treatment measures  Outcome: Progressing  Flowsheets (Taken 1/16/2024 0736)  Participates in plan/prevention/treatment measures:   Discuss with provider PT/OT consult   Elevate heels   Increase activity/out of bed for meals  Goal: Decreased wound size/increased tissue granulation at next dressing change  Outcome: Progressing  Flowsheets (Taken 1/16/2024 0736)  Decreased wound size/increased tissue granulation at next dressing change:   Promote sleep for wound healing   Protective dressings over bony prominences   Utilize specialty bed per algorithm  Goal: Prevent/manage excess moisture  Outcome: Progressing  Flowsheets (Taken 1/16/2024 0736)  Prevent/manage excess moisture:   Cleanse incontinence/protect with barrier cream   Follow provider orders for dressing changes   Monitor for/manage infection if present   Moisturize dry skin  Goal: Prevent/minimize sheer/friction injuries  Outcome: Progressing  Flowsheets (Taken 1/16/2024 0736)  Prevent/minimize sheer/friction injuries:   Turn/reposition every 2 hours/use positioning/transfer devices   Utilize specialty bed per algorithm   Use pull sheet  Goal: Promote/optimize nutrition  Outcome: Progressing  Flowsheets (Taken 1/16/2024 0736)  Promote/optimize nutrition:   Discuss with provider if NPO > 2 days   Consume > 50% meals/supplements    Monitor/record intake including meals  Goal: Promote skin healing  Outcome: Progressing  Flowsheets (Taken 1/16/2024 3077)  Promote skin healing:   Assess skin/pad under line(s)/device(s)   Protective dressings over bony prominences   Rotate device position/do not position patient on device   Turn/reposition every 2 hours/use positioning/transfer devices     Problem: Nutrition  Goal: Oral intake greater than 50%  Outcome: Progressing  Goal: Lab values WNL  Outcome: Progressing  Goal: Electrolytes WNL  Outcome: Progressing     Problem: Hyponatremia  Goal: improvement in sodium by discharge  Outcome: Progressing

## 2024-01-16 NOTE — PROGRESS NOTES
Nutrition Follow Up Assessment:   Nutrition Assessment         Medical history per chart:   HTN, HLD and EtOH abuse     HPI:  Patient presents with AMS, weakness, N/V    1/16:  Patient just awoke at time of visit, reports feeling well.  Appetite is improving, patient reports tolerating meals with no further N/V, does report inability to finish meal d/t feeling full, encouraged to continue to slowly increase intake as able.  Patient reports liking Ensure supplements, will trial once daily to help ensure adequate protein-calorie intake and monitor tolerance.       1/11:  Patient seen in ICU, going through ETOH withdrawal and AMS, history obtained from chart review and IDT rounds.  Patient with poor oral intake PTA, N/V noted.  Cardiac diet with fluid restriction ordered, patient had not yet eaten this morning, will monitor.          Current Diet: Adult diet Cardiac; 70 gm fat; 2 - 3 grams Sodium; 1500 mL fluid  Average meal Intake during admission:  25-75% of meals documented.    Patient reports consuming ~50% of pancake, scrambled eggs, and a banana this morning.       Nutrition Related Findings:   Teeth: Dentures upper, Dentures lower   GI symptoms: early satiety.   BM: Last BM Date: 01/16/24  Wound Type: none (nursing/wound notes provide further details)    Food allergies: NKFA. has No Known Allergies.  Meds/Labs reviewed.  folic acid, 1 mg, oral, Daily  metoprolol succinate XL, 50 mg, oral, Daily  multivitamin with minerals, 1 tablet, oral, Daily  pantoprazole, 40 mg, intravenous, Daily  thiamine, 100 mg, oral, Daily             Nutrition Significant Labs:    Results from last 7 days   Lab Units 01/15/24  0343 01/14/24  0354 01/13/24  0505 01/12/24  1632 01/12/24  0531 01/11/24  1222 01/11/24  0624 01/10/24  0820 01/10/24  0428   GLUCOSE mg/dL 92 99 77   < > 86   < > 73*  --  95   SODIUM mmol/L 134* 128* 132*   < > 125*   < > 119*   < > 115*  115*   POTASSIUM mmol/L 3.7 4.0 3.9   < > 4.5   < > 4.5  --  4.3  "  CHLORIDE mmol/L 98 93* 96*   < > 91*   < > 88*  --  84*   CO2 mmol/L 29 30 31   < > 29   < > 25  --  24   BUN mg/dL 15 16 11   < > 14   < > 16  --  12   CREATININE mg/dL 1.07 1.16 0.96   < > 1.11   < > 1.26  --  1.31*   EGFR mL/min/1.73m*2 83 76 >90   < > 80   < > 69  --  65   CALCIUM mg/dL 7.4* 7.6* 7.2*   < > 7.7*   < > 7.6*  --  8.2*   PHOSPHORUS mg/dL  --   --   --   --  3.6  --  4.8  --  2.3*   MAGNESIUM mg/dL  --   --   --   --  2.03  --  2.01  --  1.35*    < > = values in this interval not displayed.     Lab Results   Component Value Date    HGBA1C 5.9 (A) 11/14/2022    HGBA1C 5.8 02/05/2021     Results from last 7 days   Lab Units 01/16/24  0619 01/15/24  2041 01/15/24  1626 01/15/24  1122 01/15/24  0635 01/14/24  2019 01/13/24  2018   POCT GLUCOSE mg/dL 87 102* 104* 100* 105* 147* 144*       Anthropometrics:  Height: 185.4 cm (6' 1\")   Weight: 109 kg (241 lb 2.9 oz)   BMI (Calculated): 31.83  IBW/kg (Dietitian Calculated): 83.6 kg          Weight History:   Wt Readings from Last 10 Encounters:   01/16/24 109 kg (241 lb 2.9 oz)   06/15/23 109 kg (241 lb)   12/15/22 110 kg (242 lb)   11/16/22 112 kg (246 lb)   08/04/22 110 kg (242 lb)   05/05/22 109 kg (240 lb)   03/24/22 108 kg (239 lb)   03/02/22 109 kg (241 lb)   02/16/22 109 kg (241 lb)   12/08/21 110 kg (242 lb 12.8 oz)        Weight Change %:  Weight History / % Weight Change: Stable weight since admission               Estimated Needs:   Total Energy Estimated Needs (kCal):  (5378-3080)  Method for Estimating Needs: 25-30, IBW  Total Protein Estimated Needs (g):  (100-105)  Method for Estimating Needs: 1.2, IBW  Total Fluid Estimated Needs (mL):  (per physician (hyponatremia))           Nutrition Diagnosis   Nutrition Diagnosis:       Nutrition Diagnosis  Patient has Nutrition Diagnosis: Yes  Diagnosis Status (1): Resolved  Nutrition Diagnosis 1: Predicted inadequate energy intake  Related to (1): AMS, history of ETOH abuse, altered GI function " secondary to N/V  As Evidenced by (1): noted decreased oral intake PTA and poor intake since admission       Nutrition Interventions/Recommendations   Nutrition Interventions and Recommendations:        Nutrition Prescription:  Individualized Nutrition Prescription Provided for : Continue cardiac diet with ordered fluid restriction.  Trial Ensure plus high protein once daily.  Encourage meal intake.        Nutrition Interventions:   Food and/or Nutrient Delivery Interventions  Interventions: Meals and snacks, Medical food supplement  Meals and Snacks: General healthful diet  Goal: >75% intake of meals  Medical Food Supplement: Commercial beverage  Goal: >75% intake of Ensure plus high protein once daily    Coordination of Nutrition Care by a Nutrition Professional  Collaboration and Referral of Nutrition Care: Collaboration by nutrition professional with other providers  Goal: Dr. Justice present during visit    Nutrition Education:   Education Documentation  No documentation found.      Nutrition Counseling  Counseling Theoretical Approach: Other (Comment)  Goal: Reviewed supplement options, meal encouragement       Nutrition Monitoring and Evaluation   Monitoring/Evaluation:   Food/Nutrient Related History Monitoring  Monitoring and Evaluation Plan: Energy intake  Energy Intake: Estimated energy intake  Criteria: meet >75% of estimated needs from diet and ONS    Body Composition/Growth/Weight History  Monitoring and Evaluation Plan: Weight  Weight: Weight change  Criteria: Maintain stable weight    Biochemical Data, Medical Tests and Procedures  Monitoring and Evaluation Plan: Electrolyte/renal panel, Glucose/endocrine profile  Electrolyte and Renal Panel: Sodium  Criteria: WNL  Glucose/Endocrine Profile: Glucose, casual  Criteria: WNL    Nutrition Focused Physical Findings  Monitoring and Evaluation Plan: Skin  Criteria: Maintain skin integrity            Time Spent/Follow-up Reminder:   Follow Up  Time Spent  (min): 30 minutes  Last Date of Nutrition Visit: 01/16/24  Nutrition Follow-Up Needed?: Dietitian to reassess per policy  Follow up Comment: 1/19

## 2024-01-16 NOTE — PROGRESS NOTES
Patient is recommended for MOD intensity therapies at discharge. Per Dr Justice, patient is likely ready for discharge tomorrow. Discussed with STEVE Cota TCC and CT Supervisor, and SW. HRS spoke with pt's children last week. SW is trying to determine if the screening was ever completed or not. I met with patient, explained that without a payor source he will not be able to admit to SNF. We discussed that patient will be ready for discharge in the next day. Patient stated understanding of the circumstances.

## 2024-01-16 NOTE — PROGRESS NOTES
"        Nephrology Progress Note      Nephrology following for hypopatient's natremia.   Events over night: none  Patient states he is doing well this morning and is without complaints. States he is going to go to rehab after discharge.      /71 (BP Location: Left arm, Patient Position: Lying)   Pulse 88   Temp 36.6 °C (97.9 °F) (Temporal)   Resp 16   Ht 1.854 m (6' 1\")   Wt 109 kg (241 lb 2.9 oz)   SpO2 96%   BMI 31.82 kg/m²     Input / Output:  24 HR:   Intake/Output Summary (Last 24 hours) at 1/16/2024 0857  Last data filed at 1/16/2024 0512  Gross per 24 hour   Intake 574 ml   Output 1100 ml   Net -526 ml         Physical Exam   Alert and oriented x 3 NAD  Neck: no JVD  CV: RRR  Lungs: CTA bilaterally  Abd: soft, NT, ND   Ext: trace lower extremity edema    Scheduled medications  folic acid, 1 mg, oral, Daily  metoprolol succinate XL, 50 mg, oral, Daily  multivitamin with minerals, 1 tablet, oral, Daily  pantoprazole, 40 mg, intravenous, Daily  thiamine, 100 mg, oral, Daily      Continuous medications     PRN medications  PRN medications: dextrose 10 % in water (D10W), dextrose, glucagon, LORazepam **OR** LORazepam **OR** LORazepam, ondansetron, oxygen   Results from last 7 days   Lab Units 01/15/24  0343   SODIUM mmol/L 134*   POTASSIUM mmol/L 3.7   CHLORIDE mmol/L 98   CO2 mmol/L 29   BUN mg/dL 15   CREATININE mg/dL 1.07   CALCIUM mg/dL 7.4*   PROTEIN TOTAL g/dL 4.7*   BILIRUBIN TOTAL mg/dL 7.7*   ALK PHOS U/L 171*   ALT U/L 108*   AST U/L 130*   GLUCOSE mg/dL 92        Results from last 7 days   Lab Units 01/12/24  0531 01/11/24  0624 01/10/24  0428   MAGNESIUM mg/dL 2.03 2.01 1.35*        Results from last 7 days   Lab Units 01/15/24  0343 01/14/24  0354 01/13/24  0505   WBC AUTO x10*3/uL 10.4 8.2 7.7   HEMOGLOBIN g/dL 10.2* 10.5* 10.7*   HEMATOCRIT % 30.2* 30.3* 29.9*   PLATELETS AUTO x10*3/uL 245 236 219          Assessment & Plan:     Patient is 53 y.o. male with PMHx of AUD, HTN and HLD " presented to the ED on 1/9/24 with complaints of progressive weakness is admitted to hospital for alcoholic hepatitis and hyponatremia. Nephrology consulted in view of hyponatremia.     Hypotonic Hyponatremia  -Suspect chronic issue with baseline serum sodium in the high 120s  -Beer Potomania but he was having poor intake last week with nausea and vomiting  -Na was 113 on admission  -patient received a bolus of NS on 1/9  -started on  mL/hr which ran for approximately 4 hours  -given D5W for the following 7 hours, infusion stopped at 0100 on 1/10/24  -LR set at 75 mL/hr was started yesterday and serum sodium up to 119  -Level did start to drop again off LR now 118   -Sosm 241,  Uosm 504 Mary < 10  -hypovolemic hyponatremia but this is also could be the acute liver injury playing a role  -Serum sodium corrected to 134 appropriately over admission    Hypomagnesemia   -1.35 on 1/10/24  -resolved     Hypophosphatemia  -in the setting of alcohol use disorder and decreased food intake  -improved     Recommendations:   -Stable from a nephrology stand point  -    Please message me through EPIC chat with any questions or concerns.     *note is not finalized until attending attestation*    Bernard Banegas  1/16/2024  8:58 AM     [facility]    [address], [address]   [place] [id number]  Office: [phone]

## 2024-01-16 NOTE — PROGRESS NOTES
Selvin Ochoa is a 53 y.o. male on day 6 of admission presenting with Hyponatremia.    Subjective   Selvin Ochoa is a 53 y.o. male with a PMH of HTN, HLD and EtOH use disorder presenting with weakness.      He reports progressive weakness for the slat three days. The patient reports that he has not been able to keep any solid food down the past 3 days as well.  He has been able to maintain fluids.  The patient drinks 8-10 tall boys per day.  He states that he has cut down.  The patient has had seizure activity when he has tried to quit drinking.  The patient denies any fevers, chills, night cough, or diarrhea.  He reports normal urination.  He noted yesterday that his eyes and chest were turning yellow.  The patient denies any diagnosis of cirrhosis or liver dysfunction.  The patient reports that his last drink was last night but then changed and stated that his last drink was this morning.  He denies any contacts.  He is not having any chest pain, shortness of breath, dizziness, palpitations, paresthesias, focal weakness.  Denies any abdominal pain.       1/10: Patient is now in barb EtOH withdrawal. He required phenobarbital this AM.   1/11: Patient was seen and examined.  Continues to be in alcohol withdrawal.  Sleeping quietly when I saw after getting phenobarb this morning.  Requiring 2 L nasal cannula oxygen to maintain saturation.  Hyponatremia improving with sodium of 119 this morning.  Continue IV LR for now.  Nephrologist on board.  1/12:Patient was seen and examined. Still drowsy but arousable and confused. Saturating well on room air today. Hyponatremia continues to improve 125 today.    Bilirubin slightly elevated today with AST ALT trending down.  Discussed with patient's brother at bedside.  GI and nephrologist recommendations appreciated.  Continue to trend CMP daily.  1/13: Patient was seen and examined.  More awake and interactive today.  Sodium is improved to 132 today.  Bilirubin still elevated  "but AST ALT trending down.  Trend CMP daily.  Potential discharge extended-care facility within 48 to 72 hours.    1/14: Patient was seen and examined.  Sodium is now 128 today which could be around his baseline in the setting of advancing liver disease.  AST ALT and bilirubin trending down.  1/15: Patient was seen and examined.  Sodium is now 134.  AST ALT AST ALT and bilirubin continue to trend down.  GI signed off.  Awaiting placement in extended-care facility.    1/16: Patient was seen and examined.  He continues to be awake and alert and interactive.  Seen by dietitian who is going to start the patient on protein supplementation today.  Thank you currently awaiting authorization for placement in an extended care facility.      Objective     Physical Exam  Constitutional:       General: He is in acute distress.      Appearance: He is ill-appearing and diaphoretic.   HENT:      Head: Normocephalic and atraumatic.      Mouth/Throat:      Mouth: Mucous membranes are dry.   Eyes:      Extraocular Movements: Extraocular movements intact.      Pupils: Pupils are equal, round, and reactive to light.   Cardiovascular:      Rate and Rhythm: Tachycardia present.      Heart sounds: No murmur heard.     No friction rub. No gallop.   Pulmonary:      Effort: Pulmonary effort is normal. No respiratory distress.      Breath sounds: Rales present. No wheezing or rhonchi.   Abdominal:      General: Abdomen is flat. There is no distension.      Palpations: Abdomen is soft.      Tenderness: There is no abdominal tenderness.   Musculoskeletal:         General: No swelling.   Skin:     General: Skin is warm.   Neurological:      Mental Status: He is disoriented.         Last Recorded Vitals  Blood pressure 103/70, pulse 84, temperature 36 °C (96.8 °F), temperature source Temporal, resp. rate 16, height 1.854 m (6' 1\"), weight 109 kg (241 lb 2.9 oz), SpO2 96 %.  Intake/Output last 3 Shifts:  I/O last 3 completed shifts:  In: 674 (6.2 " mL/kg) [P.O.:674]  Out: 1750 (16 mL/kg) [Urine:1750 (0.4 mL/kg/hr)]  Weight: 109.4 kg     Relevant Results                This patient has a urinary catheter   Reason for the urinary catheter remaining today? critically ill patient who need accurate urinary output measurements               Assessment/Plan   Hypovolemic Hyponatremia (likely beer potomania as well)   -Na 113 on admit   -IV fluid LR for now  -Trend Na Q4, goal correction of 6 to 8 mEq/24hrs  -Continue fluid restriction  -Intensivist and nephrology on board     Alcohol Use Disorder with Risk for withdrawal   -he is now experiencing complex withdrawal   -Continue phenobarbital taper in addition to   -Continue lorazepam via the [facility] protocol.     Alcoholic Hepatitis   -DF 22.4 on admit, no indication for steroids at this time   -AST ALT and bilirubin are trending down  -Hepatitis panel negative  -trend CMP and INR   -additional labs per GI reviewed  -Imaging with hepatic steatosis   -GI signed off    Gastritis   -start PPI      Possible Ileus   -CLD for now   -Will consult surgery if he worsens       YOLANDA   -prerenal in the setting of above   -repleting volume      HTN  -holding home meds with normal BP      DVT ppx   -SCDs    I spent 30 minutes in the follow-up management of this patient    Tripp Justice MD

## 2024-01-16 NOTE — PROGRESS NOTES
Physical Therapy    Physical Therapy Treatment    Patient Name: Selvin Ochoa  MRN: [id number]  Today's Date: 1/16/2024  Time Calculation  Start Time: 1340  Stop Time: 1403  Time Calculation (min): 23 min       Assessment/Plan   PT Assessment  Evaluation/Treatment Tolerance: Patient tolerated treatment well  End of Session Communication: PCT/NA/CTA, Bedside nurse  Assessment Comment: pt is making good progress toward goals set by PT. pt is impulsive and unsteady which increases his risk of falls.  End of Session Patient Position: Bed, 3 rail up, Alarm on     PT Plan  Treatment/Interventions: Bed mobility, Transfer training, Gait training, Endurance training, Strengthening  PT Plan: Skilled PT  PT Frequency: 4 times per week  PT Discharge Recommendations: Moderate intensity level of continued care  Equipment Recommended upon Discharge:  (TBD)  PT Recommended Transfer Status: Assist x 2  PT - OK to Discharge: Yes (WHEN MEDICALLY CLEARED)      General Visit Information:   PT  Visit  PT Received On: 01/16/24  Response to Previous Treatment: Patient with no complaints from previous session.  General  Reason for Referral: IMPAIRED MOBILITY GAIT TRAINING  Referred By: ASHVIN  Past Medical History Relevant to Rehab: WEAK; DX: HYPONATREMIA, ETOH WD AND HEPATITIS, R/O ILEUS, YOLANDA, ENCEPHALOPATHY; HX: HTN ETOH  Prior to Session Communication: Bedside nurse  Patient Position Received: Alarm on, Up in chair  Preferred Learning Style: verbal  General Comment: pt agreeable to PT. pt reported he is feeling some better and that it feels good to get OOB      Precautions:  Precautions  Medical Precautions: Fall precautions, Seizure precautions  Precautions Comment: Falls         Cognition:  Cognition  Overall Cognitive Status: Within Functional Limits    Treatments:  Bed Mobility  Bed Mobility: Yes  Bed Mobility 1  Bed Mobility 1: Supine to sitting  Level of Assistance 1: Minimum assistance  Bed Mobility Comments 1: pt educated on  safety and technique.    Ambulation/Gait Training  Ambulation/Gait Training Performed: Yes  Ambulation/Gait Training 1  Surface 1: Level tile  Device 1: Rolling walker  Assistance 1: Minimum assistance, Minimal verbal cues  Quality of Gait 1: Decreased step length, Inconsistent stride length  Comments/Distance (ft) 1: 60'x1 and 75'x1 with cues for improved safety and technique.  Transfers  Transfer: Yes  Transfer 1  Technique 1: Sit to stand, Stand to sit  Transfer Device 1: Walker  Transfer Level of Assistance 1: Minimum assistance, Moderate verbal cues  Trials/Comments 1: pt educated on proper hand placement and safety.  Transfers 2  Technique 2: Stand pivot  Transfer Device 2: Walker  Transfer Level of Assistance 2: Moderate assistance, Moderate verbal cues  Trials/Comments 2: cues fror improved walker safety and technique to decrease risk of falls.    Outcome Measures:  [facility] Basic Mobility  Turning from your back to your side while in a flat bed without using bedrails: A little  Moving from lying on your back to sitting on the side of a flat bed without using bedrails: A little  Moving to and from bed to chair (including a wheelchair): A little  Standing up from a chair using your arms (e.g. wheelchair or bedside chair): A little  To walk in hospital room: A lot  Climbing 3-5 steps with railing: Total  Basic Mobility - Total Score: 15    Education Documentation  Mobility Training, taught by Elo Kaba PTA at 1/16/2024  2:31 PM.  Learner: Patient  Readiness: Acceptance  Method: Explanation  Response: Verbalizes Understanding, Needs Reinforcement    Education Comments  No comments found.               Encounter Problems       Encounter Problems (Active)       Mobility       STG - Patient will ambulate (Progressing)       Start:  01/12/24    Expected End:  02/02/24       FWW MIN X2 75 FT         STRENGTHENING (Progressing)       Start:  01/12/24    Expected End:  02/02/24       20+ REPS EX INCREASING STRENGTH TO  PROGRESS TO OOB ACTIVITIES            Transfers       STG - Transfer from bed to chair (Progressing)       Start:  01/12/24    Expected End:  01/26/24       FWW MIN X1-2A         STG - Patient to transfer to and from sit to supine (Progressing)       Start:  01/12/24    Expected End:  01/19/24       MIN A X1 USING RAILS HOB FLAT         STG - Patient will transfer sit to and from stand (Progressing)       Start:  01/12/24    Expected End:  01/22/24       FWW MIN 1-2 A USING PROPER TECHNIQUE

## 2024-01-16 NOTE — PROGRESS NOTES
Occupational Therapy    OT Treatment    Patient Name: Selvin Ochoa  MRN: [id number]  Today's Date: 1/16/2024  Time Calculation  Start Time: 1545  Stop Time: 1608  Time Calculation (min): 23 min         Assessment:  End of Session Communication: PCT/NA/CTA  End of Session Patient Position: Bed, 3 rail up, Alarm on     Plan:    Treatment Interventions: ADL retraining, Functional transfer training, UE strengthening/ROM, Endurance training, Cognitive reorientation, Neuromuscular reeducation    Subjective   Previous Visit Info:     General:  General  Prior to Session Communication: Bedside nurse  Patient Position Received: Bed, 3 rail up, Alarm off, not on at start of session  General Comment: pt. agreeable to OT transfers MIN A x1 cueing for walker safety, Bed mobility MIN A, Toileting MAX A for hygiene and clothing management. Standing tolerance up to 2 mins pt. is shaky upon fatigue, pt. returned to bed post transfer.  Precautions:  Precautions Comment: Falls  Vital Signs:     Pain:  Pain Assessment  Pain Score: 0 - No pain    Objective    Cognition:  Cognition  Overall Cognitive Status: Within Functional Limits  Coordination:     Activities of Daily Living:      Toileting  Toileting Level of Assistance: Maximum assistance  Where Assessed: Toilet, Bedside commode (in bathroom)  Toileting Comments: pt. unable to manage clothing over hips need to increase standing balance. Also relied on therpist to assist with hygiene  Functional Standing Tolerance:     Bed Mobility/Transfers: Bed Mobility  Bed Mobility: Yes  Bed Mobility 1  Bed Mobility 1: Supine to sitting, Sitting to supine  Level of Assistance 1: Minimum assistance  Bed Mobility Comments 1: cueing for hand placement i.e use of bed rail    Transfers  Transfer: Yes  Transfer 1  Transfer From 1: Bed to  Transfer to 1:  ([facility])  Technique 1: Sit to stand, Stand to sit  Transfer Device 1: Walker  Transfer Level of Assistance 1: Minimum assistance  Trials/Comments 1: pt.  able to walk into bathroom with BSC over standard toilet. He needs cueing for hand placement for safe rise and sit to commode.    Sitting Balance:     Standing Balance:  Static Standing Balance  Static Standing-Balance Support: Bilateral upper extremity supported  Static Standing-Level of Assistance: Minimum assistance  Static Standing-Comment/Number of Minutes: 2 min s of standing cueing for posture            Outcome Measures:[facility] Daily Activity  Putting on and taking off regular lower body clothing: A lot  Bathing (including washing, rinsing, drying): A lot  Putting on and taking off regular upper body clothing: A lot  Toileting, which includes using toilet, bedpan or urinal: A lot  Taking care of personal grooming such as brushing teeth: A little  Eating Meals: A little  Daily Activity - Total Score: 14        Education Documentation  Body Mechanics, taught by NADJA Wetzel at 1/16/2024  4:28 PM.  Learner: Patient  Readiness: Acceptance  Method: Explanation, Demonstration  Response: Verbalizes Understanding, Needs Reinforcement    Precautions, taught by NADJA Wetzel at 1/16/2024  4:28 PM.  Learner: Patient  Readiness: Acceptance  Method: Explanation, Demonstration  Response: Verbalizes Understanding, Needs Reinforcement    ADL Training, taught by NADJA Wetzel at 1/16/2024  4:28 PM.  Learner: Patient  Readiness: Acceptance  Method: Explanation, Demonstration  Response: Verbalizes Understanding, Needs Reinforcement    Education Comments  No comments found.        OP EDUCATION:       Goals:  Encounter Problems       Encounter Problems (Active)       ADLs       Patient will complete daily grooming tasks brushing teeth and washing face/hair with stand by assist level of assistance and PRN adaptive equipment while standing. (Progressing)       Start:  01/12/24    Expected End:  01/26/24            Patient will complete toileting including hygiene clothing management/hygiene with  minimal assist  level of assistance and raised toilet seat and grab bars. (Progressing)       Start:  01/12/24    Expected End:  01/26/24               COGNITION/SAFETY       Patient will follow 90% Multistep commands to allow improved ADL performance. (Progressing)       Start:  01/12/24    Expected End:  01/26/24            Patient will demonstrated orientation x place, time, situation with minimal or less verbal cues. (Progressing)       Start:  01/12/24    Expected End:  01/26/24       ORIENTATION                    TRANSFERS       Patient will complete functional transfers with least restrictive device with contact guard assist level of assistance. (Progressing)       Start:  01/12/24    Expected End:  01/26/24

## 2024-01-17 LAB
ALBUMIN SERPL BCP-MCNC: 2.3 G/DL (ref 3.4–5)
ALP SERPL-CCNC: 150 U/L (ref 33–120)
ALT SERPL W P-5'-P-CCNC: 102 U/L (ref 10–52)
ANION GAP SERPL CALC-SCNC: 8 MMOL/L
AST SERPL W P-5'-P-CCNC: 120 U/L (ref 9–39)
BILIRUB SERPL-MCNC: 5.4 MG/DL (ref 0–1.2)
BUN SERPL-MCNC: 14 MG/DL (ref 6–23)
CALCIUM SERPL-MCNC: 7.3 MG/DL (ref 8.6–10.3)
CHLORIDE SERPL-SCNC: 102 MMOL/L (ref 98–107)
CO2 SERPL-SCNC: 31 MMOL/L (ref 21–32)
CREAT SERPL-MCNC: 1.07 MG/DL (ref 0.5–1.3)
EGFRCR SERPLBLD CKD-EPI 2021: 83 ML/MIN/1.73M*2
GLUCOSE SERPL-MCNC: 93 MG/DL (ref 74–99)
POTASSIUM SERPL-SCNC: 4.1 MMOL/L (ref 3.5–5.3)
PROT SERPL-MCNC: 4.7 G/DL (ref 6.4–8.2)
SODIUM SERPL-SCNC: 137 MMOL/L (ref 136–145)

## 2024-01-17 PROCEDURE — 97535 SELF CARE MNGMENT TRAINING: CPT | Mod: GO,CO

## 2024-01-17 PROCEDURE — 2500000004 HC RX 250 GENERAL PHARMACY W/ HCPCS (ALT 636 FOR OP/ED): Performed by: INTERNAL MEDICINE

## 2024-01-17 PROCEDURE — 97112 NEUROMUSCULAR REEDUCATION: CPT | Mod: GP,CQ | Performed by: PHYSICAL THERAPY ASSISTANT

## 2024-01-17 PROCEDURE — 99233 SBSQ HOSP IP/OBS HIGH 50: CPT | Performed by: INTERNAL MEDICINE

## 2024-01-17 PROCEDURE — 1100000001 HC PRIVATE ROOM DAILY

## 2024-01-17 PROCEDURE — 36415 COLL VENOUS BLD VENIPUNCTURE: CPT | Performed by: INTERNAL MEDICINE

## 2024-01-17 PROCEDURE — 97530 THERAPEUTIC ACTIVITIES: CPT | Mod: GP,CQ | Performed by: PHYSICAL THERAPY ASSISTANT

## 2024-01-17 PROCEDURE — 80053 COMPREHEN METABOLIC PANEL: CPT | Performed by: INTERNAL MEDICINE

## 2024-01-17 PROCEDURE — C9113 INJ PANTOPRAZOLE SODIUM, VIA: HCPCS | Performed by: INTERNAL MEDICINE

## 2024-01-17 PROCEDURE — 2500000001 HC RX 250 WO HCPCS SELF ADMINISTERED DRUGS (ALT 637 FOR MEDICARE OP): Performed by: INTERNAL MEDICINE

## 2024-01-17 PROCEDURE — 97116 GAIT TRAINING THERAPY: CPT | Mod: GP,CQ | Performed by: PHYSICAL THERAPY ASSISTANT

## 2024-01-17 RX ADMIN — PANTOPRAZOLE SODIUM 40 MG: 40 INJECTION, POWDER, FOR SOLUTION INTRAVENOUS at 08:09

## 2024-01-17 RX ADMIN — FOLIC ACID 1 MG: 1 TABLET ORAL at 08:09

## 2024-01-17 RX ADMIN — THIAMINE HCL TAB 100 MG 100 MG: 100 TAB at 08:09

## 2024-01-17 RX ADMIN — Medication 1 TABLET: at 08:09

## 2024-01-17 RX ADMIN — METOPROLOL SUCCINATE 50 MG: 50 TABLET, EXTENDED RELEASE ORAL at 08:09

## 2024-01-17 ASSESSMENT — COGNITIVE AND FUNCTIONAL STATUS - GENERAL
HELP NEEDED FOR BATHING: A LITTLE
MOBILITY SCORE: 14
MOBILITY SCORE: 19
STANDING UP FROM CHAIR USING ARMS: A LOT
TURNING FROM BACK TO SIDE WHILE IN FLAT BAD: A LITTLE
PERSONAL GROOMING: A LOT
MOVING FROM LYING ON BACK TO SITTING ON SIDE OF FLAT BED WITH BEDRAILS: A LITTLE
TOILETING: A LOT
HELP NEEDED FOR BATHING: A LOT
TOILETING: A LOT
DRESSING REGULAR UPPER BODY CLOTHING: A LOT
WALKING IN HOSPITAL ROOM: A LOT
MOVING TO AND FROM BED TO CHAIR: A LITTLE
DRESSING REGULAR LOWER BODY CLOTHING: A LOT
HELP NEEDED FOR BATHING: A LOT
TURNING FROM BACK TO SIDE WHILE IN FLAT BAD: A LITTLE
MOVING TO AND FROM BED TO CHAIR: A LOT
WALKING IN HOSPITAL ROOM: A LOT
CLIMB 3 TO 5 STEPS WITH RAILING: A LOT
DRESSING REGULAR LOWER BODY CLOTHING: A LOT
PERSONAL GROOMING: A LOT
DAILY ACTIVITIY SCORE: 14
STANDING UP FROM CHAIR USING ARMS: A LITTLE
DRESSING REGULAR UPPER BODY CLOTHING: A LOT
WALKING IN HOSPITAL ROOM: A LITTLE
CLIMB 3 TO 5 STEPS WITH RAILING: TOTAL
DRESSING REGULAR LOWER BODY CLOTHING: A LITTLE
MOBILITY SCORE: 14
DAILY ACTIVITIY SCORE: 14
STANDING UP FROM CHAIR USING ARMS: A LITTLE
CLIMB 3 TO 5 STEPS WITH RAILING: TOTAL
MOVING TO AND FROM BED TO CHAIR: A LOT
DAILY ACTIVITIY SCORE: 22

## 2024-01-17 ASSESSMENT — LIFESTYLE VARIABLES
ORIENTATION AND CLOUDING OF SENSORIUM: ORIENTED AND CAN DO SERIAL ADDITIONS
ANXIETY: NO ANXIETY, AT EASE
PAROXYSMAL SWEATS: NO SWEAT VISIBLE
ORIENTATION AND CLOUDING OF SENSORIUM: ORIENTED AND CAN DO SERIAL ADDITIONS
ORIENTATION AND CLOUDING OF SENSORIUM: ORIENTED AND CAN DO SERIAL ADDITIONS
VISUAL DISTURBANCES: NOT PRESENT
AUDITORY DISTURBANCES: NOT PRESENT
NAUSEA AND VOMITING: NO NAUSEA AND NO VOMITING
PAROXYSMAL SWEATS: NO SWEAT VISIBLE
TREMOR: NO TREMOR
AGITATION: NORMAL ACTIVITY
TOTAL SCORE: 0
PAROXYSMAL SWEATS: NO SWEAT VISIBLE
ANXIETY: NO ANXIETY, AT EASE
TREMOR: NO TREMOR
TOTAL SCORE: 0
AUDITORY DISTURBANCES: NOT PRESENT
VISUAL DISTURBANCES: NOT PRESENT
PAROXYSMAL SWEATS: NO SWEAT VISIBLE
ANXIETY: NO ANXIETY, AT EASE
TOTAL SCORE: 0
NAUSEA AND VOMITING: NO NAUSEA AND NO VOMITING
ANXIETY: NO ANXIETY, AT EASE
VISUAL DISTURBANCES: NOT PRESENT
AUDITORY DISTURBANCES: NOT PRESENT
TOTAL SCORE: 0
AGITATION: NORMAL ACTIVITY
TREMOR: NO TREMOR
HEADACHE, FULLNESS IN HEAD: NOT PRESENT
HEADACHE, FULLNESS IN HEAD: NOT PRESENT
AGITATION: NORMAL ACTIVITY
AUDITORY DISTURBANCES: NOT PRESENT
ORIENTATION AND CLOUDING OF SENSORIUM: ORIENTED AND CAN DO SERIAL ADDITIONS
AGITATION: NORMAL ACTIVITY
NAUSEA AND VOMITING: NO NAUSEA AND NO VOMITING
HEADACHE, FULLNESS IN HEAD: NOT PRESENT
HEADACHE, FULLNESS IN HEAD: NOT PRESENT
TREMOR: NO TREMOR
VISUAL DISTURBANCES: NOT PRESENT
NAUSEA AND VOMITING: NO NAUSEA AND NO VOMITING

## 2024-01-17 ASSESSMENT — PAIN SCALES - GENERAL
PAINLEVEL_OUTOF10: 0 - NO PAIN

## 2024-01-17 ASSESSMENT — ACTIVITIES OF DAILY LIVING (ADL): HOME_MANAGEMENT_TIME_ENTRY: 13

## 2024-01-17 ASSESSMENT — PAIN - FUNCTIONAL ASSESSMENT
PAIN_FUNCTIONAL_ASSESSMENT: 0-10
PAIN_FUNCTIONAL_ASSESSMENT: 0-10

## 2024-01-17 NOTE — PROGRESS NOTES
01/17/24 1031   Discharge Planning   Does the patient need discharge transport arranged? Yes   RoundTrip coordination needed? Yes   Has discharge transport been arranged? No   Patient Choice   Provider Choice list and CMS website (https://medicare.gov/care-compare#search) for post-acute Quality and Resource Measure Data were provided and reviewed with: Patient     Met with patient, explained he met criteria for Medicaid on HRS screening, so can go to SNF. Patient in agreement with plan. [facility] provided a CAREPORT list to patient, and reviewed list with patient. Patient called his sister for assistance with choices; she was unavailable. Patient was calling his brother and will give choices later this morning. I will follow up with patient for his decision. Medicaid pending # is still pending.     11:30 Followed up with patient, and also spoke with pt's sister via speaker on pt's cell phone. Patient is requesting referrals to [facility] and [facility]. I did explain to pt and sister that they may need to make additional choices as we are not sure if these facilities accept Medicaid Pending or have a bed available for patient. We are working with [facility] to obtain the Medicaid Pending #.     1425: Medicaid Pending #[id number]. Referrals were requested to be sent to [facility] and 2. [facility] . Patient will be ready for discharge on Friday.     1530: Neither [facility] or [facility] are able to accept. I met with patient again to rechoice. Patient's sister and brother live in [place], and sister had mentioned earlier that it would be nice to have patient closer to her. I took a Careport list based on the [place] zip [facility] to patient. We looked over the list. Patient is going to call his family tonight to make additional choices.

## 2024-01-17 NOTE — CARE PLAN
Problem: Fall/Injury  Goal: Not fall by end of shift  Outcome: Progressing  Goal: Be free from injury by end of the shift  Outcome: Progressing  Goal: Verbalize understanding of personal risk factors for fall in the hospital  Outcome: Progressing  Goal: Verbalize understanding of risk factor reduction measures to prevent injury from fall in the home  Outcome: Progressing  Goal: Use assistive devices by end of the shift  Outcome: Progressing  Goal: Pace activities to prevent fatigue by end of the shift  Outcome: Progressing     Problem: Skin  Goal: Participates in plan/prevention/treatment measures  Outcome: Progressing  Flowsheets (Taken 1/17/2024 0754)  Participates in plan/prevention/treatment measures:   Discuss with provider PT/OT consult   Elevate heels   Increase activity/out of bed for meals  Goal: Decreased wound size/increased tissue granulation at next dressing change  Outcome: Progressing  Flowsheets (Taken 1/17/2024 0754)  Decreased wound size/increased tissue granulation at next dressing change:   Promote sleep for wound healing   Protective dressings over bony prominences  Goal: Prevent/manage excess moisture  Outcome: Progressing  Flowsheets (Taken 1/17/2024 0754)  Prevent/manage excess moisture:   Cleanse incontinence/protect with barrier cream   Follow provider orders for dressing changes  Goal: Prevent/minimize sheer/friction injuries  Outcome: Progressing  Flowsheets (Taken 1/17/2024 0754)  Prevent/minimize sheer/friction injuries:   Turn/reposition every 2 hours/use positioning/transfer devices   Increase activity/out of bed for meals  Goal: Promote/optimize nutrition  Outcome: Progressing  Flowsheets (Taken 1/17/2024 0754)  Promote/optimize nutrition:   Consume > 50% meals/supplements   Offer water/supplements/favorite foods  Goal: Promote skin healing  Outcome: Progressing  Flowsheets (Taken 1/17/2024 0754)  Promote skin healing:   Assess skin/pad under line(s)/device(s)   Protective  dressings over bony prominences   Turn/reposition every 2 hours/use positioning/transfer devices     Problem: Nutrition  Goal: Oral intake greater than 50%  Outcome: Progressing  Goal: Lab values WNL  Outcome: Progressing  Goal: Electrolytes WNL  Outcome: Progressing     Problem: Hyponatremia  Goal: improvement in sodium by discharge  Outcome: Progressing     Problem: Pain - Adult  Goal: Verbalizes/displays adequate comfort level or baseline comfort level  Outcome: Progressing     Problem: Safety - Adult  Goal: Free from fall injury  Outcome: Progressing     Problem: Discharge Planning  Goal: Discharge to home or other facility with appropriate resources  Outcome: Progressing     Problem: Chronic Conditions and Co-morbidities  Goal: Patient's chronic conditions and co-morbidity symptoms are monitored and maintained or improved  Outcome: Progressing     The patient's goals for the shift include      The clinical goals for the shift include Patient remains free from falls and injury during shift    Over the shift, the patient did not make progress toward the following goals.

## 2024-01-17 NOTE — PROGRESS NOTES
Physical Therapy    Physical Therapy Treatment    Patient Name: Selvin Ochoa  MRN: [id number]  Today's Date: 1/17/2024  Time Calculation  Start Time: 1027  Stop Time: 1105  Time Calculation (min): 38 min       Assessment/Plan   PT Assessment  Evaluation/Treatment Tolerance: Patient tolerated treatment well  End of Session Communication: Bedside nurse  Assessment Comment: pt continues to progress with gait and transfers. He is motivated and compliant and will benefit form further skilled services.  End of Session Patient Position: Up in chair, Alarm on     PT Plan  Treatment/Interventions: Bed mobility, Transfer training, Gait training, Endurance training, Strengthening  PT Plan: Skilled PT  PT Frequency: 4 times per week  PT Discharge Recommendations: Moderate intensity level of continued care  Equipment Recommended upon Discharge:  (TBD)  PT Recommended Transfer Status: Assist x2  PT - OK to Discharge: Yes (WHEN MEDICALLY CLEARED)      General Visit Information:   PT  Visit  PT Received On: 01/17/24  Response to Previous Treatment: Patient with no complaints from previous session.  General  Reason for Referral: IMPAIRED MOBILITY GAIT TRAINING  Referred By: ASHVIN  Past Medical History Relevant to Rehab: WEAK; DX: HYPONATREMIA, ETOH WD AND HEPATITIS, R/O ILEUS, YOLANDA, ENCEPHALOPATHY; HX: HTN ETOH  Prior to Session Communication: Bedside nurse  Patient Position Received: Up in chair, Alarm on  Preferred Learning Style: verbal  General Comment: pt demonstrating good motivation with PT. He is hoping to go to SNF at [place]. He expresses that family wants him to go to rehab for ETOH abuse.      Precautions:  Precautions  Precautions Comment: Falls              Cognition:  Cognition  Overall Cognitive Status: Within Functional Limits    Treatments:  Balance/Neuromuscular Re-Education  Balance/Neuromuscular Re-Education Activity Performed: Yes  Balance/Neuromuscular Re-Education Activity 1: pt was able to perfrom proactive and  reactive stand balacne static and dynamic. pt performed with wheeled walker and Mn Ax1 for safety. He is impulsive occasionally and is unsteady but no overt LOB was noted.         Ambulation/Gait Training  Ambulation/Gait Training Performed: Yes  Ambulation/Gait Training 1  Surface 1: Level tile  Device 1: Rolling walker  Assistance 1: Minimum assistance, Minimal verbal cues  Quality of Gait 1: Decreased step length, Inconsistent stride length  Comments/Distance (ft) 1: 50'x4 with rest breaks in between in standing. Education provided for safety and technique to decrease risk of falls.  Transfers  Transfer: Yes  Transfer 1  Technique 1: Sit to stand, Stand to sit  Transfer Device 1: Walker  Transfer Level of Assistance 1: Minimum assistance  Trials/Comments 1: Cues for safety and technique to decrease risk of falls.  Transfers 2  Technique 2: Stand pivot  Transfer Device 2: Walker  Transfer Level of Assistance 2: Moderate assistance  Trials/Comments 2: cues for walker technique and safety during pivot.    Outcome Measures:  [facility] Basic Mobility  Turning from your back to your side while in a flat bed without using bedrails: A little  Moving from lying on your back to sitting on the side of a flat bed without using bedrails: A little  Moving to and from bed to chair (including a wheelchair): A lot  Standing up from a chair using your arms (e.g. wheelchair or bedside chair): A little  To walk in hospital room: A lot  Climbing 3-5 steps with railing: Total  Basic Mobility - Total Score: 14    Education Documentation  Mobility Training, taught by Elo Kaba PTA at 1/17/2024  2:38 PM.  Learner: Patient  Readiness: Acceptance  Method: Explanation  Response: Verbalizes Understanding, Needs Reinforcement    Education Comments  No comments found.               Encounter Problems       Encounter Problems (Active)       Mobility       STG - Patient will ambulate (Progressing)       Start:  01/12/24    Expected End:  02/02/24        FWW MIN X2 75 FT         STRENGTHENING (Progressing)       Start:  01/12/24    Expected End:  02/02/24       20+ REPS EX INCREASING STRENGTH TO PROGRESS TO OOB ACTIVITIES            Pain - Adult          Transfers       STG - Transfer from bed to chair (Progressing)       Start:  01/12/24    Expected End:  01/26/24       FWW MIN X1-2A         STG - Patient to transfer to and from sit to supine (Progressing)       Start:  01/12/24    Expected End:  01/19/24       MIN A X1 USING RAILS HOB FLAT         STG - Patient will transfer sit to and from stand (Progressing)       Start:  01/12/24    Expected End:  01/22/24       FWW MIN 1-2 A USING PROPER TECHNIQUE

## 2024-01-17 NOTE — PROGRESS NOTES
Selvin Ochoa is a 53 y.o. male on day 7 of admission presenting with Hyponatremia.    Subjective   Selvin Ochoa is a 53 y.o. male with a PMH of HTN, HLD and EtOH use disorder presenting with weakness.      He reports progressive weakness for the slat three days. The patient reports that he has not been able to keep any solid food down the past 3 days as well.  He has been able to maintain fluids.  The patient drinks 8-10 tall boys per day.  He states that he has cut down.  The patient has had seizure activity when he has tried to quit drinking.  The patient denies any fevers, chills, night cough, or diarrhea.  He reports normal urination.  He noted yesterday that his eyes and chest were turning yellow.  The patient denies any diagnosis of cirrhosis or liver dysfunction.  The patient reports that his last drink was last night but then changed and stated that his last drink was this morning.  He denies any contacts.  He is not having any chest pain, shortness of breath, dizziness, palpitations, paresthesias, focal weakness.  Denies any abdominal pain.       1/10: Patient is now in barb EtOH withdrawal. He required phenobarbital this AM.   1/11: Patient was seen and examined.  Continues to be in alcohol withdrawal.  Sleeping quietly when I saw after getting phenobarb this morning.  Requiring 2 L nasal cannula oxygen to maintain saturation.  Hyponatremia improving with sodium of 119 this morning.  Continue IV LR for now.  Nephrologist on board.  1/12:Patient was seen and examined. Still drowsy but arousable and confused. Saturating well on room air today. Hyponatremia continues to improve 125 today.    Bilirubin slightly elevated today with AST ALT trending down.  Discussed with patient's brother at bedside.  GI and nephrologist recommendations appreciated.  Continue to trend CMP daily.  1/13: Patient was seen and examined.  More awake and interactive today.  Sodium is improved to 132 today.  Bilirubin still elevated  but AST ALT trending down.  Trend CMP daily.  Potential discharge extended-care facility within 48 to 72 hours.    1/14: Patient was seen and examined.  Sodium is now 128 today which could be around his baseline in the setting of advancing liver disease.  AST ALT and bilirubin trending down.  1/15: Patient was seen and examined.  Sodium is now 134.  AST ALT AST ALT and bilirubin continue to trend down.  GI signed off.  Awaiting placement in extended-care facility.    1/16: Patient was seen and examined.  He continues to be awake and alert and interactive.  Seen by dietitian who is going to start the patient on protein supplementation today.  Thank you currently awaiting authorization for placement in an extended care facility.  1/17: Patient was seen and examined.  Awake and interactive sitting on a chair at the bedside today when I saw.  Patient has been set up for [facility] pending for potential discharge to extended-care facility once authorization can be obtained.      Objective     Physical Exam  Constitutional:       General: He is in acute distress.      Appearance: He is ill-appearing and diaphoretic.   HENT:      Head: Normocephalic and atraumatic.      Mouth/Throat:      Mouth: Mucous membranes are dry.   Eyes:      Extraocular Movements: Extraocular movements intact.      Pupils: Pupils are equal, round, and reactive to light.   Cardiovascular:      Rate and Rhythm: Tachycardia present.      Heart sounds: No murmur heard.     No friction rub. No gallop.   Pulmonary:      Effort: Pulmonary effort is normal. No respiratory distress.      Breath sounds: Rales present. No wheezing or rhonchi.   Abdominal:      General: Abdomen is flat. There is no distension.      Palpations: Abdomen is soft.      Tenderness: There is no abdominal tenderness.   Musculoskeletal:         General: No swelling.   Skin:     General: Skin is warm.   Neurological:      Mental Status: He is disoriented.         Last Recorded  "Vitals  Blood pressure 100/68, pulse 85, temperature 36.2 °C (97.2 °F), temperature source Temporal, resp. rate 18, height 1.854 m (6' 1\"), weight 107 kg (234 lb 12.8 oz), SpO2 97 %.  Intake/Output last 3 Shifts:  I/O last 3 completed shifts:  In: 1489 (14 mL/kg) [P.O.:1489]  Out: 1600 (15 mL/kg) [Urine:1600 (0.4 mL/kg/hr)]  Weight: 106.5 kg     Relevant Results                This patient has a urinary catheter   Reason for the urinary catheter remaining today? critically ill patient who need accurate urinary output measurements               Assessment/Plan   Hypovolemic Hyponatremia (likely beer potomania as well)   -Na 113 on admit   -IV fluid LR for now  -Trend Na Q4, goal correction of 6 to 8 mEq/24hrs  -Continue fluid restriction  -Intensivist and nephrology on board     Alcohol Use Disorder with Risk for withdrawal   -he is now experiencing complex withdrawal   -Continue phenobarbital taper in addition to   -Continue lorazepam via the [facility] protocol.     Alcoholic Hepatitis   -DF 22.4 on admit, no indication for steroids at this time   -AST ALT and bilirubin are trending down  -Hepatitis panel negative  -trend CMP and INR   -additional labs per GI reviewed  -Imaging with hepatic steatosis   -GI signed off    Gastritis   -start PPI      Possible Ileus   -CLD for now   -Will consult surgery if he worsens       YOLANDA   -prerenal in the setting of above   -repleting volume      HTN  -holding home meds with normal BP      DVT ppx   -SCDs    I spent 30 minutes in the follow-up management of this patient    Tripp Justice MD      "

## 2024-01-17 NOTE — PROGRESS NOTES
"Occupational Therapy    Occupational Therapy Treatment    Name: Selvin Ochoa  MRN: [id number]  : 1970  Date: 24  Time Calculation  Start Time: 141  Stop Time:   Time Calculation (min): 13 min    Assessment:  End of Session Communication: Bedside nurse  End of Session Patient Position: Up in chair, Alarm on  Plan:  Treatment Interventions: Endurance training, ADL retraining, UE strengthening/ROM, Functional transfer training  OT Frequency: 3 times per week  OT Discharge Recommendations: Moderate intensity level of continued care  Equipment Recommended upon Discharge: Wheeled walker  OT - OK to Discharge: Yes    Subjective   Previous Visit Info:  OT Last Visit  OT Received On: 24  General:  General  Reason for Referral: IMPAIRED MOBILITY GAIT TRAINING  Referred By: ASHVIN  Past Medical History Relevant to Rehab: WEAK; DX: HYPONATREMIA, ETOH WD AND HEPATITIS, R/O ILEUS, YOLANDA, ENCEPHALOPATHY; HX: HTN ETOH  Prior to Session Communication: Bedside nurse  Patient Position Received: Up in chair, Alarm on  Preferred Learning Style: verbal  General Comment: pt sitting up in chair with family member present. Pt required some encouragement d/t pt stating he had \"already done pt\". Therapist educated pt on OT services and goals.  Precautions:     Vitals:     Pain Assessment:  Pain Assessment  Pain Assessment: 0-10  Pain Score: 0 - No pain     Objective        Bed Mobility/Transfers: Transfers  Transfer: Yes  Transfer 1  Transfer From 1: Chair with arms to  Transfer to 1: Stand  Technique 1: Sit to stand, Stand to sit  Transfer Device 1: Walker  Transfer Level of Assistance 1: Minimum assistance  Trials/Comments 1: pt completed STS t/f from armed chair with Tim and cues for hand placement and proper t/f technique. Pt demos good follow through and carryover      Ambulation/Gait Training:  Ambulation/Gait Training  Ambulation/Gait Training Performed: Yes  Ambulation/Gait Training 1  Comments/Distance (ft) " 1: therapist implemented fxl mobility to implement multistep commands/tasks and increase orientation. Pt completed household distance with FWW and cga with no LOB. Pt did required min vc's for walker managment and spatial awareness      Outcome Measures:  [facility] Daily Activity  Putting on and taking off regular lower body clothing: A lot  Bathing (including washing, rinsing, drying): A lot  Putting on and taking off regular upper body clothing: A lot  Toileting, which includes using toilet, bedpan or urinal: A lot  Taking care of personal grooming such as brushing teeth: A lot  Eating Meals: None  Daily Activity - Total Score: 14        Education Documentation  Body Mechanics, taught by NADJA Tapia at 1/17/2024  2:55 PM.  Learner: Patient  Readiness: Acceptance  Method: Explanation  Response: Verbalizes Understanding    Precautions, taught by NADJA Tapia at 1/17/2024  2:55 PM.  Learner: Patient  Readiness: Acceptance  Method: Explanation  Response: Verbalizes Understanding    ADL Training, taught by NADJA Tapia at 1/17/2024  2:55 PM.  Learner: Patient  Readiness: Acceptance  Method: Explanation  Response: Verbalizes Understanding    Education Comments  No comments found.      Goals:  Encounter Problems       Encounter Problems (Active)       ADLs       Patient will complete daily grooming tasks brushing teeth and washing face/hair with stand by assist level of assistance and PRN adaptive equipment while standing. (Progressing)       Start:  01/12/24    Expected End:  01/26/24            Patient will complete toileting including hygiene clothing management/hygiene with minimal assist  level of assistance and raised toilet seat and grab bars. (Progressing)       Start:  01/12/24    Expected End:  01/26/24               COGNITION/SAFETY       Patient will follow 90% Multistep commands to allow improved ADL performance. (Progressing)       Start:  01/12/24    Expected End:  01/26/24                    TRANSFERS       Patient will complete functional transfers with least restrictive device with contact guard assist level of assistance. (Progressing)       Start:  01/12/24    Expected End:  01/26/24

## 2024-01-18 PROCEDURE — 97116 GAIT TRAINING THERAPY: CPT | Mod: GP,CQ | Performed by: PHYSICAL THERAPY ASSISTANT

## 2024-01-18 PROCEDURE — 97112 NEUROMUSCULAR REEDUCATION: CPT | Mod: GP,CQ | Performed by: PHYSICAL THERAPY ASSISTANT

## 2024-01-18 PROCEDURE — 2500000001 HC RX 250 WO HCPCS SELF ADMINISTERED DRUGS (ALT 637 FOR MEDICARE OP): Performed by: INTERNAL MEDICINE

## 2024-01-18 PROCEDURE — 92526 ORAL FUNCTION THERAPY: CPT | Mod: GN | Performed by: SPEECH-LANGUAGE PATHOLOGIST

## 2024-01-18 PROCEDURE — 2500000004 HC RX 250 GENERAL PHARMACY W/ HCPCS (ALT 636 FOR OP/ED): Performed by: INTERNAL MEDICINE

## 2024-01-18 PROCEDURE — 99233 SBSQ HOSP IP/OBS HIGH 50: CPT | Performed by: INTERNAL MEDICINE

## 2024-01-18 PROCEDURE — 97110 THERAPEUTIC EXERCISES: CPT | Mod: GP,CQ | Performed by: PHYSICAL THERAPY ASSISTANT

## 2024-01-18 PROCEDURE — C9113 INJ PANTOPRAZOLE SODIUM, VIA: HCPCS | Performed by: INTERNAL MEDICINE

## 2024-01-18 PROCEDURE — 97530 THERAPEUTIC ACTIVITIES: CPT | Mod: GO,CO

## 2024-01-18 PROCEDURE — 1100000001 HC PRIVATE ROOM DAILY

## 2024-01-18 RX ADMIN — METOPROLOL SUCCINATE 50 MG: 50 TABLET, EXTENDED RELEASE ORAL at 08:52

## 2024-01-18 RX ADMIN — Medication 1 TABLET: at 08:52

## 2024-01-18 RX ADMIN — THIAMINE HCL TAB 100 MG 100 MG: 100 TAB at 08:52

## 2024-01-18 RX ADMIN — FOLIC ACID 1 MG: 1 TABLET ORAL at 08:52

## 2024-01-18 RX ADMIN — PANTOPRAZOLE SODIUM 40 MG: 40 INJECTION, POWDER, FOR SOLUTION INTRAVENOUS at 08:52

## 2024-01-18 ASSESSMENT — COGNITIVE AND FUNCTIONAL STATUS - GENERAL
HELP NEEDED FOR BATHING: A LOT
WALKING IN HOSPITAL ROOM: A LOT
CLIMB 3 TO 5 STEPS WITH RAILING: A LOT
MOVING TO AND FROM BED TO CHAIR: A LITTLE
DAILY ACTIVITIY SCORE: 16
MOBILITY SCORE: 14
HELP NEEDED FOR BATHING: A LITTLE
TURNING FROM BACK TO SIDE WHILE IN FLAT BAD: A LITTLE
DAILY ACTIVITIY SCORE: 12
PERSONAL GROOMING: A LOT
WALKING IN HOSPITAL ROOM: A LITTLE
CLIMB 3 TO 5 STEPS WITH RAILING: TOTAL
MOVING TO AND FROM BED TO CHAIR: A LOT
PERSONAL GROOMING: A LITTLE
HELP NEEDED FOR BATHING: A LITTLE
DRESSING REGULAR LOWER BODY CLOTHING: A LITTLE
CLIMB 3 TO 5 STEPS WITH RAILING: A LOT
STANDING UP FROM CHAIR USING ARMS: A LITTLE
DRESSING REGULAR LOWER BODY CLOTHING: A LOT
EATING MEALS: A LOT
STANDING UP FROM CHAIR USING ARMS: A LITTLE
MOVING FROM LYING ON BACK TO SITTING ON SIDE OF FLAT BED WITH BEDRAILS: A LITTLE
TOILETING: A LOT
DRESSING REGULAR UPPER BODY CLOTHING: A LITTLE
MOVING FROM LYING ON BACK TO SITTING ON SIDE OF FLAT BED WITH BEDRAILS: A LITTLE
DAILY ACTIVITIY SCORE: 22
MOVING TO AND FROM BED TO CHAIR: A LOT
DAILY ACTIVITIY SCORE: 22
MOVING TO AND FROM BED TO CHAIR: A LITTLE
STANDING UP FROM CHAIR USING ARMS: A LITTLE
TOILETING: A LOT
DRESSING REGULAR UPPER BODY CLOTHING: A LOT
MOBILITY SCORE: 19
MOBILITY SCORE: 14
DRESSING REGULAR LOWER BODY CLOTHING: A LITTLE
WALKING IN HOSPITAL ROOM: A LITTLE
TURNING FROM BACK TO SIDE WHILE IN FLAT BAD: A LITTLE
WALKING IN HOSPITAL ROOM: A LOT
MOBILITY SCORE: 19
CLIMB 3 TO 5 STEPS WITH RAILING: TOTAL
STANDING UP FROM CHAIR USING ARMS: A LITTLE
DRESSING REGULAR LOWER BODY CLOTHING: A LOT
HELP NEEDED FOR BATHING: A LOT

## 2024-01-18 ASSESSMENT — LIFESTYLE VARIABLES
ORIENTATION AND CLOUDING OF SENSORIUM: ORIENTED AND CAN DO SERIAL ADDITIONS
TREMOR: NOT VISIBLE, BUT CAN BE FELT FINGERTIP TO FINGERTIP
TREMOR: 2
BLOOD PRESSURE: 119/75
ANXIETY: NO ANXIETY, AT EASE
AUDITORY DISTURBANCES: NOT PRESENT
TOTAL SCORE: 2
HEADACHE, FULLNESS IN HEAD: NOT PRESENT
ORIENTATION AND CLOUDING OF SENSORIUM: ORIENTED AND CAN DO SERIAL ADDITIONS
PAROXYSMAL SWEATS: NO SWEAT VISIBLE
PAROXYSMAL SWEATS: NO SWEAT VISIBLE
NAUSEA AND VOMITING: NO NAUSEA AND NO VOMITING
AGITATION: NORMAL ACTIVITY
TOTAL SCORE: 1
PULSE: 70
AGITATION: NORMAL ACTIVITY
ANXIETY: NO ANXIETY, AT EASE
HEADACHE, FULLNESS IN HEAD: NOT PRESENT
VISUAL DISTURBANCES: NOT PRESENT
AUDITORY DISTURBANCES: NOT PRESENT
VISUAL DISTURBANCES: NOT PRESENT
NAUSEA AND VOMITING: NO NAUSEA AND NO VOMITING

## 2024-01-18 ASSESSMENT — PAIN - FUNCTIONAL ASSESSMENT
PAIN_FUNCTIONAL_ASSESSMENT: 0-10

## 2024-01-18 ASSESSMENT — PAIN SCALES - GENERAL
PAINLEVEL_OUTOF10: 0 - NO PAIN

## 2024-01-18 NOTE — PROGRESS NOTES
Physical Therapy    Physical Therapy Treatment    Patient Name: Selvin Ochoa  MRN: [id number]  Today's Date: 1/18/2024  Time Calculation  Start Time: 1440  Stop Time: 1520  Time Calculation (min): 40 min       Assessment/Plan   PT Assessment  Evaluation/Treatment Tolerance: Patient tolerated treatment well  End of Session Communication: Bedside nurse  Assessment Comment: pt continues to progress with gait and transfers. He is motivated and compliant and will benefit form further skilled services. pt demonstrated need for MOD Ax 1 with gait around objects but was MIN Ax 1 on straight away.  End of Session Patient Position: Up in chair, Alarm on     PT Plan  Treatment/Interventions: Bed mobility, Transfer training, Gait training, Endurance training, Strengthening  PT Plan: Skilled PT  PT Frequency: 4 times per week  PT Discharge Recommendations: Moderate intensity level of continued care  Equipment Recommended upon Discharge:  (TBD)  PT Recommended Transfer Status: Assist x2  PT - OK to Discharge: Yes (WHEN MEDICALLY CLEARED)      General Visit Information:   PT  Visit  PT Received On: 01/18/24  Response to Previous Treatment: Patient with no complaints from previous session.  General  Reason for Referral: IMPAIRED MOBILITY GAIT TRAINING  Referred By: ROSLYN  Past Medical History Relevant to Rehab: WEAK; DX: HYPONATREMIA, ETOH WD AND HEPATITIS, R/O ILEUS, YOLANDA, ENCEPHALOPATHY; HX: HTN ETOH  Prior to Session Communication: Bedside nurse  Patient Position Received: Up in chair, Alarm on  Preferred Learning Style: verbal  General Comment: pt agreeable to PT and cleared by RN. pt stated he is feeling better daily. He looks forward to rehab.      Precautions:  Precautions  Precautions Comment: Falls       Cognition:  Cognition  Overall Cognitive Status: Within Functional Limits       Treatments:  Therapeutic Exercise  Therapeutic Exercise Performed: Yes (in standing at wheeled walker. pt with cues for safety and  technique.)  Therapeutic Exercise Activity 1: ankle pumps x 15  Therapeutic Exercise Activity 2: marches x15  Therapeutic Exercise Activity 3: hamstring curls x15  Therapeutic Exercise Activity 4: hip ab/adduction x 15    Balance/Neuromuscular Re-Education  Balance/Neuromuscular Re-Education Activity Performed: Yes  Balance/Neuromuscular Re-Education Activity 1: pt performed static and dynamic stand balance activities with walker and cues for safety. pt was able to perform bending and reaching activities. pt was taught proper safety and technique.    Ambulation/Gait Training  Ambulation/Gait Training Performed: Yes  Ambulation/Gait Training 1  Surface 1: Level tile  Device 1: Rolling walker  Assistance 1: Minimum assistance, Minimal verbal cues (pt required MOD Ax1 to negotiate around objects. HE is impulsive and at increased risk of falls.)  Quality of Gait 1: Decreased step length, Inconsistent stride length  Comments/Distance (ft) 1: 50'x4 with standing rest break.  Transfers  Transfer: Yes  Transfer 1  Technique 1: Sit to stand, Stand to sit  Transfer Device 1: Walker  Transfer Level of Assistance 1: Minimum assistance  Trials/Comments 1: pt educated on hand placement and safety.    Outcome Measures:  [facility] Basic Mobility  Turning from your back to your side while in a flat bed without using bedrails: A little  Moving from lying on your back to sitting on the side of a flat bed without using bedrails: A little  Moving to and from bed to chair (including a wheelchair): A lot  Standing up from a chair using your arms (e.g. wheelchair or bedside chair): A little  To walk in hospital room: A lot  Climbing 3-5 steps with railing: Total  Basic Mobility - Total Score: 14    Education Documentation  Mobility Training, taught by Elo Kaba PTA at 1/18/2024  3:31 PM.  Learner: Patient  Readiness: Acceptance  Method: Explanation  Response: Verbalizes Understanding, Needs Reinforcement    Education Comments  No comments  found.             Encounter Problems       Encounter Problems (Active)       Mobility       STG - Patient will ambulate (Progressing)       Start:  01/12/24    Expected End:  02/02/24       FWW MIN X2 75 FT         STRENGTHENING (Progressing)       Start:  01/12/24    Expected End:  02/02/24       20+ REPS EX INCREASING STRENGTH TO PROGRESS TO OOB ACTIVITIES            Pain - Adult          Transfers       STG - Transfer from bed to chair (Progressing)       Start:  01/12/24    Expected End:  01/26/24       FWW MIN X1-2A         STG - Patient to transfer to and from sit to supine (Progressing)       Start:  01/12/24    Expected End:  01/19/24       MIN A X1 USING RAILS HOB FLAT         STG - Patient will transfer sit to and from stand (Progressing)       Start:  01/12/24    Expected End:  01/22/24       FWW MIN 1-2 A USING PROPER TECHNIQUE

## 2024-01-18 NOTE — PROGRESS NOTES
Speech-Language Pathology Clinical Swallow Treatment    Patient Name: Selvin Ochoa  MRN: [id number]  : 1970  Today's Date: 24  Start time: Start Time: 1515  Stop time: Stop Time: 1543  Time calculation (min) : Time Calculation (min): 28 min    ASSESSMENT  SLP TX Intervention Outcome: Making Progress Towards Goals     Treatment Tolerance: Patient tolerated treatment well    IMPRESSIONS: Pt exhibiting oropharyngeal phases of swallow function WFL.   Pt exhibited no overt s/s penetration and or aspiration for items tested.      PLAN  Recommended solid consistency: Regular (IDDSI level 7)  Recommended liquid consistency: Thin (IDDSI 0)  Recommended medication administration:  as per pt preference   Safe swallow strategies: Upright positioning for all PO intake, Remain upright for >30 min after meals, Slow rate of intake, and Alternate bites and sips  Discharge recommendation: See PT/OT notes for discharge plan.   Inpatient/Swing Bed or Outpatient: Inpatient  SLP TX Plan: Discharge from Speech Therapy  SLP Plan: No skilled SLP  SLP Frequency:  (N/A)  Duration:  (N/A)  Next Treatment Priority: N/A - Goals Met  Discussed POC: Patient, Nursing, Physician  Patient/Caregiver Agreeable: Yes      Goals: 1. Pt will consume prescribed diet (current diet Regular diet Texture and Thin liquids) without overt s/sx aspiration/penetration >95% of the time.  Goal Met       SUBJECTIVE  Prior to Session Communication: Bedside nurse  RN cleared pt to participate in session and reported no issues with taking medications or consuming meals.   Respiratory status: Room air  Positioning: Upright in chair  Pt was alert, pleasant, and cooperative for session.  Pt noted no issues with chewing or swallowing foods or liquids.     Pain Assessment: 0-10  Pain Score: 0 - No pain             Oriented to self, Oriented to location, Oriented to month, Oriented to year, and Oriented to situation    OBJECTIVE  Swallow reassessment:  Therapeutic  Swallow  Therapeutic Swallow Intervention : PO Trials  Solid Diet Recommendations: Regular (IDDSI Level 7)  Liquid Diet Recommendations: Thin (IDDSI Level 0)  Swallow Comments: Pt consumed two saltine peanut butter cracker sandwiches and 3 oz of water.     Oral Phase:  Good mastication noted. Piecemeal deglutition noted. No oral residues after the final swallow.     Pharyngeal Phase: Good hyolaryngeal elevation and excursion.  Pt exhibited no overt s/s penetration and or aspiration for items tested.    Continue Regular diet Texture and Thin liquids.    No further SLP services for dysphagia.       Treatment/Education:  Results and recommendations were relayed to: Patient, Bedside nurse, and Physician  Education provided: Yes   Learner: Patient   Barriers to learning: Acuteness of illness barrier   Method of teaching: Verbal   Topic: Role of ST, results of assessment, risk for aspiration, recommended diet modifications, recommendation for MBSS, recommended safe swallow strategies, and recommendation for dysphagia follow-up   Outcome of teaching: Pt verbalized understanding

## 2024-01-18 NOTE — PROGRESS NOTES
Selvin Ochoa is a 53 y.o. male on day 8 of admission presenting with Hyponatremia.    Subjective   Selvin Ochoa is a 53 y.o. male with a PMH of HTN, HLD and EtOH use disorder presenting with weakness.      He reports progressive weakness for the slat three days. The patient reports that he has not been able to keep any solid food down the past 3 days as well.  He has been able to maintain fluids.  The patient drinks 8-10 tall boys per day.  He states that he has cut down.  The patient has had seizure activity when he has tried to quit drinking.  The patient denies any fevers, chills, night cough, or diarrhea.  He reports normal urination.  He noted yesterday that his eyes and chest were turning yellow.  The patient denies any diagnosis of cirrhosis or liver dysfunction.  The patient reports that his last drink was last night but then changed and stated that his last drink was this morning.  He denies any contacts.  He is not having any chest pain, shortness of breath, dizziness, palpitations, paresthesias, focal weakness.  Denies any abdominal pain.       1/10: Patient is now in barb EtOH withdrawal. He required phenobarbital this AM.   1/11: Patient was seen and examined.  Continues to be in alcohol withdrawal.  Sleeping quietly when I saw after getting phenobarb this morning.  Requiring 2 L nasal cannula oxygen to maintain saturation.  Hyponatremia improving with sodium of 119 this morning.  Continue IV LR for now.  Nephrologist on board.  1/12:Patient was seen and examined. Still drowsy but arousable and confused. Saturating well on room air today. Hyponatremia continues to improve 125 today.    Bilirubin slightly elevated today with AST ALT trending down.  Discussed with patient's brother at bedside.  GI and nephrologist recommendations appreciated.  Continue to trend CMP daily.  1/13: Patient was seen and examined.  More awake and interactive today.  Sodium is improved to 132 today.  Bilirubin still elevated  but AST ALT trending down.  Trend CMP daily.  Potential discharge extended-care facility within 48 to 72 hours.    1/14: Patient was seen and examined.  Sodium is now 128 today which could be around his baseline in the setting of advancing liver disease.  AST ALT and bilirubin trending down.  1/15: Patient was seen and examined.  Sodium is now 134.  AST ALT AST ALT and bilirubin continue to trend down.  GI signed off.  Awaiting placement in extended-care facility.    1/16: Patient was seen and examined.  He continues to be awake and alert and interactive.  Seen by dietitian who is going to start the patient on protein supplementation today.  Thank you currently awaiting authorization for placement in an extended care facility.  1/17: Patient was seen and examined.  Awake and interactive sitting on a chair at the bedside today when I saw.  Patient has been set up for [facility] pending for potential discharge to extended-care facility once authorization can be obtained.  1/18:.  Patient seen and examined.  Was eating dinner when I saw.  Has no new complaints today.  Currently awaiting placement in extended-care facility.      Objective     Physical Exam  Constitutional:       General: He is in acute distress.      Appearance: He is ill-appearing and diaphoretic.   HENT:      Head: Normocephalic and atraumatic.      Mouth/Throat:      Mouth: Mucous membranes are dry.   Eyes:      Extraocular Movements: Extraocular movements intact.      Pupils: Pupils are equal, round, and reactive to light.   Cardiovascular:      Rate and Rhythm: Tachycardia present.      Heart sounds: No murmur heard.     No friction rub. No gallop.   Pulmonary:      Effort: Pulmonary effort is normal. No respiratory distress.      Breath sounds: Rales present. No wheezing or rhonchi.   Abdominal:      General: Abdomen is flat. There is no distension.      Palpations: Abdomen is soft.      Tenderness: There is no abdominal tenderness.  "  Musculoskeletal:         General: No swelling.   Skin:     General: Skin is warm.   Neurological:      Mental Status: He is disoriented.         Last Recorded Vitals  Blood pressure 112/71, pulse 78, temperature 36.3 °C (97.4 °F), temperature source Temporal, resp. rate 18, height 1.854 m (6' 1\"), weight 105 kg (232 lb 3.2 oz), SpO2 98 %.  Intake/Output last 3 Shifts:  I/O last 3 completed shifts:  In: 911 (8.6 mL/kg) [P.O.:911]  Out: 2025 (19.2 mL/kg) [Urine:2025 (0.5 mL/kg/hr)]  Weight: 105.3 kg     Relevant Results                This patient has a urinary catheter   Reason for the urinary catheter remaining today? critically ill patient who need accurate urinary output measurements               Assessment/Plan   Hypovolemic Hyponatremia (likely beer potomania as well)   Resolved  -Na 113 on admit   -IV fluid LR for now  -Trend Na Q4, goal correction of 6 to 8 mEq/24hrs  -Continue fluid restriction  -nephrology on board     Alcohol Use Disorder with DT  -Continue lorazepam via the [facility] protocol.     Alcoholic Hepatitis   -DF 22.4 on admit, no indication for steroids at this time   -AST ALT and bilirubin are trending down  -Hepatitis panel negative  -trend CMP and INR   -additional labs per GI reviewed  -Imaging with hepatic steatosis   -GI signed off    Gastritis   -Continue PPI     Possible Ileus   -Tolerating regular diet       YOLANDA   Resolved  -prerenal in the setting of above   -repleting volume      HTN  -holding home meds with soft BP      DVT ppx   -SCDs    I spent 30 minutes in the follow-up management of this patient    Tripp Justice MD      "

## 2024-01-18 NOTE — PROGRESS NOTES
Spoke with patient and a cousin at bedside. Patient with new SNF choices today for [facility] 2. [facility] 3. [facility]. Referrals sent via [facility], awaiting responses.     1530: No accepting facilities at this time. Met with patient to re-choice. Due to patient's social circumstances and needing to admit under Medicaid Pending, I suggested to patient that we send a mass referral. Patient did not agree with this and requested referrals to [facility] and [facility]. Referrals were submitted for review with [facility] declining almost instantly. Patient is aware we will follow up in the morning.

## 2024-01-18 NOTE — PROGRESS NOTES
Occupational Therapy    OT Treatment    Patient Name: Selvin Ochoa  MRN: [id number]  Today's Date: 1/18/2024  Time Calculation  Start Time: 1458  Stop Time: 1517  Time Calculation (min): 19 min         Assessment:  End of Session Communication: Bedside nurse  End of Session Patient Position: Up in chair, Alarm on       Plan: Continue to increase independence with ADLS and Transfers.         Subjective     Current Problem:  Patient Active Problem List   Diagnosis    Alcohol abuse, continuous    Benign hypertension    Mixed hyperlipidemia    Nervously anxious    Class 1 obesity with body mass index (BMI) of 30.0 to 30.9 in adult    Class 1 obesity with body mass index (BMI) of 31.0 to 31.9 in adult    Hyponatremia       General:  OT Received On: 01/18/24  Prior to Session Communication: Bedside nurse  Patient Position Received: Up in chair, Alarm on  General Comment: pt. agreeable to OT. Marked improvement with transfers, balance and cognition. He is MIN A x1 for transfer cueing for safety. He feels he has improved.         Pain:  Pain Assessment  Pain Assessment: 0-10  Pain Score: 0 - No pain  Objective      Activities of Daily Living:        Functional Standing Tolerance: standing tolerance up to 2-3 mins using FWW for balance        Bed Mobility/Transfers:   Transfers  Transfer: Yes  Transfer 1  Transfer From 1: Chair with arms to  Transfer to 1: Chair with arms (post functional reaching activity)  Technique 1: Sit to stand, Stand to sit (ambulating wiht FWW)  Transfer Device 1: Walker  Transfer Level of Assistance 1: Minimum assistance  Trials/Comments 1: pt. educated on hand and body position when retrieving items            Therapy/Activity:          Balance/Neuromuscular Re-Education  Balance/Neuromuscular Re-Education Activity Performed: Yes  Balance/Neuromuscular Re-Education Activity 1: pt. educated on proper positioning when rertrieving items from high and low areas. balance is fair . No loss of balance  noted mild fatigue post completion of task           Outcome Measures:[facility] Daily Activity  Putting on and taking off regular lower body clothing: A lot  Bathing (including washing, rinsing, drying): A lot  Putting on and taking off regular upper body clothing: A little  Toileting, which includes using toilet, bedpan or urinal: A lot  Taking care of personal grooming such as brushing teeth: A little  Eating Meals: None  Daily Activity - Total Score: 16  Education Documentation  Body Mechanics, taught by NADJA Wetzel at 1/18/2024  4:06 PM.  Learner: Patient  Readiness: Acceptance  Method: Explanation, Demonstration  Response: Verbalizes Understanding, Demonstrated Understanding, Needs Reinforcement    Precautions, taught by NADJA Wetzel at 1/18/2024  4:06 PM.  Learner: Patient  Readiness: Acceptance  Method: Explanation, Demonstration  Response: Verbalizes Understanding, Demonstrated Understanding, Needs Reinforcement    ADL Training, taught by NADJA Wetzel at 1/18/2024  4:06 PM.  Learner: Patient  Readiness: Acceptance  Method: Explanation, Demonstration  Response: Verbalizes Understanding, Demonstrated Understanding, Needs Reinforcement    Education Comments  No comments found.        EDUCATION:       Goals:  Encounter Problems       Encounter Problems (Active)       ADLs       Patient will complete daily grooming tasks brushing teeth and washing face/hair with stand by assist level of assistance and PRN adaptive equipment while standing. (Not Progressing)       Start:  01/12/24    Expected End:  01/26/24            Patient will complete toileting including hygiene clothing management/hygiene with minimal assist  level of assistance and raised toilet seat and grab bars. (Not Progressing)       Start:  01/12/24    Expected End:  01/26/24               COGNITION/SAFETY       Patient will follow 90% Multistep commands to allow improved ADL performance. (Progressing)       Start:   01/12/24    Expected End:  01/26/24            Patient will demonstrated orientation x place, time, situation with minimal or less verbal cues. (Progressing)       Start:  01/12/24    Expected End:  01/26/24       ORIENTATION              TRANSFERS       Patient will complete functional transfers with least restrictive device with contact guard assist level of assistance. (Progressing)       Start:  01/12/24    Expected End:  01/26/24

## 2024-01-18 NOTE — PROGRESS NOTES
Spiritual Care Visit    Clinical Encounter Type  Visited With: Patient  Routine Visit: Introduction    Yazdanism Encounters  Yazdanism Needs: Prayer

## 2024-01-19 PROCEDURE — 2500000001 HC RX 250 WO HCPCS SELF ADMINISTERED DRUGS (ALT 637 FOR MEDICARE OP): Performed by: INTERNAL MEDICINE

## 2024-01-19 PROCEDURE — 2500000005 HC RX 250 GENERAL PHARMACY W/O HCPCS: Performed by: INTERNAL MEDICINE

## 2024-01-19 PROCEDURE — 2500000004 HC RX 250 GENERAL PHARMACY W/ HCPCS (ALT 636 FOR OP/ED): Performed by: INTERNAL MEDICINE

## 2024-01-19 PROCEDURE — 99233 SBSQ HOSP IP/OBS HIGH 50: CPT | Performed by: INTERNAL MEDICINE

## 2024-01-19 PROCEDURE — C9113 INJ PANTOPRAZOLE SODIUM, VIA: HCPCS | Performed by: INTERNAL MEDICINE

## 2024-01-19 PROCEDURE — 1100000001 HC PRIVATE ROOM DAILY

## 2024-01-19 RX ADMIN — FOLIC ACID 1 MG: 1 TABLET ORAL at 08:21

## 2024-01-19 RX ADMIN — Medication: at 15:26

## 2024-01-19 RX ADMIN — METOPROLOL SUCCINATE 50 MG: 50 TABLET, EXTENDED RELEASE ORAL at 08:21

## 2024-01-19 RX ADMIN — Medication 1 TABLET: at 08:21

## 2024-01-19 RX ADMIN — THIAMINE HCL TAB 100 MG 100 MG: 100 TAB at 08:21

## 2024-01-19 RX ADMIN — PANTOPRAZOLE SODIUM 40 MG: 40 INJECTION, POWDER, FOR SOLUTION INTRAVENOUS at 08:21

## 2024-01-19 ASSESSMENT — COGNITIVE AND FUNCTIONAL STATUS - GENERAL
STANDING UP FROM CHAIR USING ARMS: A LITTLE
MOVING TO AND FROM BED TO CHAIR: A LOT
HELP NEEDED FOR BATHING: A LOT
MOBILITY SCORE: 14
TOILETING: A LOT
MOVING FROM LYING ON BACK TO SITTING ON SIDE OF FLAT BED WITH BEDRAILS: A LITTLE
DRESSING REGULAR UPPER BODY CLOTHING: A LITTLE
MOBILITY SCORE: 14
DRESSING REGULAR LOWER BODY CLOTHING: A LOT
DAILY ACTIVITIY SCORE: 16
DAILY ACTIVITIY SCORE: 16
TOILETING: A LOT
TURNING FROM BACK TO SIDE WHILE IN FLAT BAD: A LITTLE
CLIMB 3 TO 5 STEPS WITH RAILING: TOTAL
STANDING UP FROM CHAIR USING ARMS: A LITTLE
CLIMB 3 TO 5 STEPS WITH RAILING: TOTAL
MOVING FROM LYING ON BACK TO SITTING ON SIDE OF FLAT BED WITH BEDRAILS: A LITTLE
WALKING IN HOSPITAL ROOM: A LOT
PERSONAL GROOMING: A LITTLE
MOVING TO AND FROM BED TO CHAIR: A LOT
HELP NEEDED FOR BATHING: A LOT
PERSONAL GROOMING: A LITTLE
DRESSING REGULAR LOWER BODY CLOTHING: A LOT
TURNING FROM BACK TO SIDE WHILE IN FLAT BAD: A LITTLE
WALKING IN HOSPITAL ROOM: A LOT
DRESSING REGULAR UPPER BODY CLOTHING: A LITTLE

## 2024-01-19 ASSESSMENT — LIFESTYLE VARIABLES
TREMOR: NO TREMOR
AGITATION: NORMAL ACTIVITY
ANXIETY: NO ANXIETY, AT EASE
PAROXYSMAL SWEATS: NO SWEAT VISIBLE
ORIENTATION AND CLOUDING OF SENSORIUM: ORIENTED AND CAN DO SERIAL ADDITIONS
AUDITORY DISTURBANCES: NOT PRESENT
VISUAL DISTURBANCES: NOT PRESENT
NAUSEA AND VOMITING: NO NAUSEA AND NO VOMITING
TOTAL SCORE: 0
HEADACHE, FULLNESS IN HEAD: NOT PRESENT

## 2024-01-19 ASSESSMENT — PAIN SCALES - GENERAL
PAINLEVEL_OUTOF10: 0 - NO PAIN
PAINLEVEL_OUTOF10: 0 - NO PAIN

## 2024-01-19 ASSESSMENT — PAIN - FUNCTIONAL ASSESSMENT: PAIN_FUNCTIONAL_ASSESSMENT: 0-10

## 2024-01-19 NOTE — PROGRESS NOTES
Selvin Ochoa is a 53 y.o. male on day 9 of admission presenting with Hyponatremia.    Subjective   Selvin Ochoa is a 53 y.o. male with a PMH of HTN, HLD and EtOH use disorder presenting with weakness.      He reports progressive weakness for the slat three days. The patient reports that he has not been able to keep any solid food down the past 3 days as well.  He has been able to maintain fluids.  The patient drinks 8-10 tall boys per day.  He states that he has cut down.  The patient has had seizure activity when he has tried to quit drinking.  The patient denies any fevers, chills, night cough, or diarrhea.  He reports normal urination.  He noted yesterday that his eyes and chest were turning yellow.  The patient denies any diagnosis of cirrhosis or liver dysfunction.  The patient reports that his last drink was last night but then changed and stated that his last drink was this morning.  He denies any contacts.  He is not having any chest pain, shortness of breath, dizziness, palpitations, paresthesias, focal weakness.  Denies any abdominal pain.       1/10: Patient is now in barb EtOH withdrawal. He required phenobarbital this AM.   1/11: Patient was seen and examined.  Continues to be in alcohol withdrawal.  Sleeping quietly when I saw after getting phenobarb this morning.  Requiring 2 L nasal cannula oxygen to maintain saturation.  Hyponatremia improving with sodium of 119 this morning.  Continue IV LR for now.  Nephrologist on board.  1/12:Patient was seen and examined. Still drowsy but arousable and confused. Saturating well on room air today. Hyponatremia continues to improve 125 today.    Bilirubin slightly elevated today with AST ALT trending down.  Discussed with patient's brother at bedside.  GI and nephrologist recommendations appreciated.  Continue to trend CMP daily.  1/13: Patient was seen and examined.  More awake and interactive today.  Sodium is improved to 132 today.  Bilirubin still elevated  but AST ALT trending down.  Trend CMP daily.  Potential discharge extended-care facility within 48 to 72 hours.    1/14: Patient was seen and examined.  Sodium is now 128 today which could be around his baseline in the setting of advancing liver disease.  AST ALT and bilirubin trending down.  1/15: Patient was seen and examined.  Sodium is now 134.  AST ALT AST ALT and bilirubin continue to trend down.  GI signed off.  Awaiting placement in extended-care facility.    1/16: Patient was seen and examined.  He continues to be awake and alert and interactive.  Seen by dietitian who is going to start the patient on protein supplementation today.  Thank you currently awaiting authorization for placement in an extended care facility.  1/17: Patient was seen and examined.  Awake and interactive sitting on a chair at the bedside today when I saw.  Patient has been set up for [facility] pending for potential discharge to extended-care facility once authorization can be obtained.  1/18:.  Patient seen and examined.  Was eating dinner when I saw.  Has no new complaints today.  Currently awaiting placement in extended-care facility.  1/19: Patient was seen and examined.. Has no new complaints today.  Currently awaiting accepting extended-care facility.      Objective     Physical Exam  Constitutional:       General: He is in acute distress.      Appearance: He is ill-appearing and diaphoretic.   HENT:      Head: Normocephalic and atraumatic.      Mouth/Throat:      Mouth: Mucous membranes are dry.   Eyes:      Extraocular Movements: Extraocular movements intact.      Pupils: Pupils are equal, round, and reactive to light.   Cardiovascular:      Rate and Rhythm: Tachycardia present.      Heart sounds: No murmur heard.     No friction rub. No gallop.   Pulmonary:      Effort: Pulmonary effort is normal. No respiratory distress.      Breath sounds: Rales present. No wheezing or rhonchi.   Abdominal:      General: Abdomen is flat.  "There is no distension.      Palpations: Abdomen is soft.      Tenderness: There is no abdominal tenderness.   Musculoskeletal:         General: No swelling.   Skin:     General: Skin is warm.   Neurological:      Mental Status: He is disoriented.         Last Recorded Vitals  Blood pressure 109/73, pulse 79, temperature 36.4 °C (97.6 °F), temperature source Temporal, resp. rate 18, height 1.854 m (6' 1\"), weight 105 kg (231 lb 7.7 oz), SpO2 96 %.  Intake/Output last 3 Shifts:  I/O last 3 completed shifts:  In: 1131 (10.8 mL/kg) [P.O.:1131]  Out: 1845 (17.6 mL/kg) [Urine:1845 (0.5 mL/kg/hr)]  Weight: 105 kg     Relevant Results                This patient has a urinary catheter   Reason for the urinary catheter remaining today? critically ill patient who need accurate urinary output measurements               Assessment/Plan   Hypovolemic Hyponatremia (likely beer potomania as well)   Resolved  -Na 113 on admit   -IV fluid LR for now  -Trend Na Q4, goal correction of 6 to 8 mEq/24hrs  -Continue fluid restriction  -Intensivist and nephrology on board     Alcohol Use Disorder with Risk for withdrawal   -he is now experiencing complex withdrawal   -Continue phenobarbital taper in addition to   -Continue lorazepam via the [facility] protocol.     Alcoholic Hepatitis   -DF 22.4 on admit, no indication for steroids at this time   -AST ALT and bilirubin are trending down  -Hepatitis panel negative  -trend CMP and INR   -additional labs per GI reviewed  -Imaging with hepatic steatosis   -GI signed off    Gastritis   -Continue PPI     Possible Ileus   -Tolerating regular diet       YOLANDA   Resolved  -prerenal in the setting of above   -repleting volume      HTN  -holding home meds with soft BP      DVT ppx   -SCDs    I spent 35 minutes in the follow-up management of this patient    Tripp Justice MD      "

## 2024-01-19 NOTE — PROGRESS NOTES
Nutrition Follow Up Assessment:   Nutrition Assessment         Medical history per chart:   HTN, HLD and EtOH abuse     HPI:  Patient presents with AMS, weakness, N/V    1/19:  Patient awake, alert at time of visit, patient's cousin present at bedside.  Patient reports good appetite with exception of yesterday for dinner.  Patient with loose fitting dentures, needs softer foods, will adjust diet to easy to chew, also requesting auto trays to be delivered.  Will continue to provide Ensure max protein daily.  Plan for discharge to [facility] noted.       1/16:  Patient just awoke at time of visit, reports feeling well.  Appetite is improving, patient reports tolerating meals with no further N/V, does report inability to finish meal d/t feeling full, encouraged to continue to slowly increase intake as able.  Patient reports liking Ensure supplements, will trial once daily to help ensure adequate protein-calorie intake and monitor tolerance.       1/11:  Patient seen in ICU, going through ETOH withdrawal and AMS, history obtained from chart review and IDT rounds.  Patient with poor oral intake PTA, N/V noted.  Cardiac diet with fluid restriction ordered, patient had not yet eaten this morning, will monitor.        Current Diet: Adult diet Cardiac; 70 gm fat; 2 - 3 grams Sodium; 1500 mL fluid  Supplement(s): Yes: Ensure max protein daily   Average meal Intake during admission: %   Average supplement intake during admission:  has not been receiving, discussed with diet office.        Nutrition Related Findings:   Oral Symptoms: chewing  loose fitting dentures Teeth: Dentures upper, Dentures lower   GI symptoms: no GI issues at this time.   BM: Last BM Date: 01/16/24  Wound Type: none (nursing/wound notes provide further details)    Food allergies: NKFA. has No Known Allergies.  Meds/Labs reviewed.  folic acid, 1 mg, oral, Daily  metoprolol succinate XL, 50 mg, oral, Daily  multivitamin with minerals, 1 tablet, oral,  "Daily  pantoprazole, 40 mg, intravenous, Daily  thiamine, 100 mg, oral, Daily             Nutrition Significant Labs:    Results from last 7 days   Lab Units 01/17/24  0326 01/15/24  0343 01/14/24  0354   GLUCOSE mg/dL 93 92 99   SODIUM mmol/L 137 134* 128*   POTASSIUM mmol/L 4.1 3.7 4.0   CHLORIDE mmol/L 102 98 93*   CO2 mmol/L 31 29 30   BUN mg/dL 14 15 16   CREATININE mg/dL 1.07 1.07 1.16   EGFR mL/min/1.73m*2 83 83 76   CALCIUM mg/dL 7.3* 7.4* 7.6*     Lab Results   Component Value Date    HGBA1C 5.9 (A) 11/14/2022    HGBA1C 5.8 02/05/2021     Results from last 7 days   Lab Units 01/16/24  0619 01/15/24  2041 01/15/24  1626 01/15/24  1122 01/15/24  0635 01/14/24 2019 01/13/24 2018   POCT GLUCOSE mg/dL 87 102* 104* 100* 105* 147* 144*       Anthropometrics:  Height: 185.4 cm (6' 1\")   Weight: 105 kg (231 lb 7.7 oz)   BMI (Calculated): 30.55  IBW/kg (Dietitian Calculated): 83.6 kg          Weight History:   Wt Readings from Last 10 Encounters:   01/19/24 105 kg (231 lb 7.7 oz)   06/15/23 109 kg (241 lb)   12/15/22 110 kg (242 lb)   11/16/22 112 kg (246 lb)   08/04/22 110 kg (242 lb)   05/05/22 109 kg (240 lb)   03/24/22 108 kg (239 lb)   03/02/22 109 kg (241 lb)   02/16/22 109 kg (241 lb)   12/08/21 110 kg (242 lb 12.8 oz)        Weight Change %:  Weight History / % Weight Change: Weight decreasing since admission             Estimated Needs:   Total Energy Estimated Needs (kCal):  (6575-2574)  Method for Estimating Needs: 25-30, IBW  Total Protein Estimated Needs (g):  (100-105)  Method for Estimating Needs: 1.2, IBW  Total Fluid Estimated Needs (mL):  (per physician (hyponatremia))           Nutrition Diagnosis   Nutrition Diagnosis:       Nutrition Diagnosis  Patient has Nutrition Diagnosis: Yes  Diagnosis Status (1): Resolved  Nutrition Diagnosis 1: Predicted inadequate energy intake  Related to (1): AMS, history of ETOH abuse, altered GI function secondary to N/V  As Evidenced by (1): noted decreased oral " intake PTA and poor intake since admission       Nutrition Interventions/Recommendations   Nutrition Interventions and Recommendations:        Nutrition Prescription:  Individualized Nutrition Prescription Provided for : Modify cardiac diet with easy to chew.  Continue fluid restriction per physician order.  Continue Ensure plus high protein daily.  Place on auto trays per patient request.        Nutrition Interventions:   Food and/or Nutrient Delivery Interventions  Interventions: Meals and snacks, Medical food supplement  Meals and Snacks: General healthful diet  Goal: >75% intake of meals  Medical Food Supplement: Commercial beverage  Goal: >75% intake of Ensure plus high protein once daily         Nutrition Education:   Education Documentation  No documentation found.      Nutrition Counseling  Counseling Theoretical Approach: Other (Comment)  Goal: Reviewed plan of auto trays and easy to chew diet, reviewed current fluid restriction with patient and cousin.       Nutrition Monitoring and Evaluation   Monitoring/Evaluation:   Food/Nutrient Related History Monitoring  Monitoring and Evaluation Plan: Energy intake  Energy Intake: Estimated energy intake  Criteria: meet >75% of estimated needs from diet and ONS    Body Composition/Growth/Weight History  Monitoring and Evaluation Plan: Weight  Weight: Weight change  Criteria: Maintain stable weight    Biochemical Data, Medical Tests and Procedures  Monitoring and Evaluation Plan: Electrolyte/renal panel  Electrolyte and Renal Panel: Sodium  Criteria: WNL    Nutrition Focused Physical Findings  Monitoring and Evaluation Plan: Skin  Criteria: Maintain skin integrity            Time Spent/Follow-up Reminder:   Follow Up  Time Spent (min): 40 minutes  Last Date of Nutrition Visit: 01/19/24  Nutrition Follow-Up Needed?: Dietitian to reassess per policy  Follow up Comment: 1/24-1/26

## 2024-01-19 NOTE — PROGRESS NOTES
1/19/24 1152  Spoke with pt and pt cousin at bedside. Updated them on most recent updates and addressed questions/ concerns. Explained pending Medicaid to both. Both agreeable to sending out mass referrals and seeing facility responses. Pt and pt request a follow up with updates when available. Requested [facility] to send mass SNF referrals to places within 25 miles of pt preferred zipcode of [facility]. Pt and cousin also agreeable to sending mass referrals to facilities near [facility].   Nathaly Busby RN, BSN, [place]/ [facility] CT Supervisor

## 2024-01-19 NOTE — CARE PLAN
The patient's goals for the shift include      The clinical goals for the shift include pt will remain  free from alls and injury during this  shift.    Over the shift, the patient did not make progress toward the following goals. Barriers to progression include n/a. Recommendations to address these barriers include n/a.

## 2024-01-19 NOTE — CARE PLAN
Problem: Fall/Injury  Goal: Not fall by end of shift  Outcome: Progressing  Goal: Be free from injury by end of the shift  Outcome: Progressing  Goal: Verbalize understanding of personal risk factors for fall in the hospital  Outcome: Progressing  Goal: Verbalize understanding of risk factor reduction measures to prevent injury from fall in the home  Outcome: Progressing  Goal: Use assistive devices by end of the shift  Outcome: Progressing  Goal: Pace activities to prevent fatigue by end of the shift  Outcome: Progressing     Problem: Skin  Goal: Participates in plan/prevention/treatment measures  Outcome: Progressing  Flowsheets (Taken 1/19/2024 0740)  Participates in plan/prevention/treatment measures:   Discuss with provider PT/OT consult   Elevate heels   Increase activity/out of bed for meals  Goal: Decreased wound size/increased tissue granulation at next dressing change  Outcome: Progressing  Flowsheets (Taken 1/19/2024 0740)  Decreased wound size/increased tissue granulation at next dressing change:   Promote sleep for wound healing   Protective dressings over bony prominences  Goal: Prevent/manage excess moisture  Outcome: Progressing  Flowsheets (Taken 1/19/2024 0740)  Prevent/manage excess moisture:   Cleanse incontinence/protect with barrier cream   Follow provider orders for dressing changes  Goal: Prevent/minimize sheer/friction injuries  Outcome: Progressing  Flowsheets (Taken 1/19/2024 0740)  Prevent/minimize sheer/friction injuries: Turn/reposition every 2 hours/use positioning/transfer devices  Goal: Promote/optimize nutrition  Outcome: Progressing  Flowsheets (Taken 1/19/2024 0740)  Promote/optimize nutrition:   Consume > 50% meals/supplements   Assist with feeding   Offer water/supplements/favorite foods  Goal: Promote skin healing  Outcome: Progressing  Flowsheets (Taken 1/19/2024 0740)  Promote skin healing: Turn/reposition every 2 hours/use positioning/transfer devices     Problem:  Nutrition  Goal: Oral intake greater than 50%  Outcome: Progressing  Goal: Lab values WNL  Outcome: Progressing  Goal: Electrolytes WNL  Outcome: Progressing     Problem: Hyponatremia  Goal: improvement in sodium by discharge  Outcome: Progressing     Problem: Pain - Adult  Goal: Verbalizes/displays adequate comfort level or baseline comfort level  Outcome: Progressing     Problem: Safety - Adult  Goal: Free from fall injury  Outcome: Progressing     Problem: Discharge Planning  Goal: Discharge to home or other facility with appropriate resources  Outcome: Progressing     Problem: Chronic Conditions and Co-morbidities  Goal: Patient's chronic conditions and co-morbidity symptoms are monitored and maintained or improved  Outcome: Progressing     The patient's goals for the shift include      The clinical goals for the shift include Remain free from falls and injury during shift    Over the shift, the patient did not make progress toward the following goals.

## 2024-01-20 PROCEDURE — 2500000001 HC RX 250 WO HCPCS SELF ADMINISTERED DRUGS (ALT 637 FOR MEDICARE OP): Performed by: INTERNAL MEDICINE

## 2024-01-20 PROCEDURE — 2500000004 HC RX 250 GENERAL PHARMACY W/ HCPCS (ALT 636 FOR OP/ED): Performed by: INTERNAL MEDICINE

## 2024-01-20 PROCEDURE — C9113 INJ PANTOPRAZOLE SODIUM, VIA: HCPCS | Performed by: INTERNAL MEDICINE

## 2024-01-20 PROCEDURE — 1100000001 HC PRIVATE ROOM DAILY

## 2024-01-20 PROCEDURE — 99232 SBSQ HOSP IP/OBS MODERATE 35: CPT | Performed by: INTERNAL MEDICINE

## 2024-01-20 RX ADMIN — THIAMINE HCL TAB 100 MG 100 MG: 100 TAB at 08:24

## 2024-01-20 RX ADMIN — Medication 1 TABLET: at 08:24

## 2024-01-20 RX ADMIN — FOLIC ACID 1 MG: 1 TABLET ORAL at 08:24

## 2024-01-20 RX ADMIN — PANTOPRAZOLE SODIUM 40 MG: 40 INJECTION, POWDER, FOR SOLUTION INTRAVENOUS at 08:24

## 2024-01-20 RX ADMIN — METOPROLOL SUCCINATE 50 MG: 50 TABLET, EXTENDED RELEASE ORAL at 08:24

## 2024-01-20 ASSESSMENT — COGNITIVE AND FUNCTIONAL STATUS - GENERAL
CLIMB 3 TO 5 STEPS WITH RAILING: TOTAL
DAILY ACTIVITIY SCORE: 16
MOVING TO AND FROM BED TO CHAIR: A LOT
TURNING FROM BACK TO SIDE WHILE IN FLAT BAD: A LITTLE
HELP NEEDED FOR BATHING: A LOT
TOILETING: A LOT
DRESSING REGULAR UPPER BODY CLOTHING: A LITTLE
MOBILITY SCORE: 14
DRESSING REGULAR LOWER BODY CLOTHING: A LOT
TOILETING: A LOT
HELP NEEDED FOR BATHING: A LOT
DAILY ACTIVITIY SCORE: 16
DRESSING REGULAR LOWER BODY CLOTHING: A LOT
PERSONAL GROOMING: A LITTLE
WALKING IN HOSPITAL ROOM: A LOT
MOBILITY SCORE: 14
MOVING TO AND FROM BED TO CHAIR: A LOT
TURNING FROM BACK TO SIDE WHILE IN FLAT BAD: A LITTLE
MOVING FROM LYING ON BACK TO SITTING ON SIDE OF FLAT BED WITH BEDRAILS: A LITTLE
STANDING UP FROM CHAIR USING ARMS: A LITTLE
STANDING UP FROM CHAIR USING ARMS: A LITTLE
CLIMB 3 TO 5 STEPS WITH RAILING: TOTAL
WALKING IN HOSPITAL ROOM: A LOT
PERSONAL GROOMING: A LITTLE
MOVING FROM LYING ON BACK TO SITTING ON SIDE OF FLAT BED WITH BEDRAILS: A LITTLE
DRESSING REGULAR UPPER BODY CLOTHING: A LITTLE

## 2024-01-20 ASSESSMENT — LIFESTYLE VARIABLES
ANXIETY: NO ANXIETY, AT EASE
AGITATION: NORMAL ACTIVITY
AGITATION: NORMAL ACTIVITY
NAUSEA AND VOMITING: NO NAUSEA AND NO VOMITING
ORIENTATION AND CLOUDING OF SENSORIUM: ORIENTED AND CAN DO SERIAL ADDITIONS
AUDITORY DISTURBANCES: NOT PRESENT
VISUAL DISTURBANCES: NOT PRESENT
ORIENTATION AND CLOUDING OF SENSORIUM: ORIENTED AND CAN DO SERIAL ADDITIONS
HEADACHE, FULLNESS IN HEAD: NOT PRESENT
TOTAL SCORE: 0
HEADACHE, FULLNESS IN HEAD: NOT PRESENT
NAUSEA AND VOMITING: NO NAUSEA AND NO VOMITING
NAUSEA AND VOMITING: NO NAUSEA AND NO VOMITING
PAROXYSMAL SWEATS: NO SWEAT VISIBLE
TREMOR: NO TREMOR
TOTAL SCORE: 0
AGITATION: NORMAL ACTIVITY
ANXIETY: NO ANXIETY, AT EASE
PAROXYSMAL SWEATS: NO SWEAT VISIBLE
TREMOR: NO TREMOR
ANXIETY: NO ANXIETY, AT EASE
HEADACHE, FULLNESS IN HEAD: NOT PRESENT
TREMOR: NO TREMOR
VISUAL DISTURBANCES: NOT PRESENT
ORIENTATION AND CLOUDING OF SENSORIUM: ORIENTED AND CAN DO SERIAL ADDITIONS
PAROXYSMAL SWEATS: NO SWEAT VISIBLE
VISUAL DISTURBANCES: NOT PRESENT
AUDITORY DISTURBANCES: NOT PRESENT
TOTAL SCORE: 0
AUDITORY DISTURBANCES: NOT PRESENT

## 2024-01-20 ASSESSMENT — PAIN SCALES - GENERAL
PAINLEVEL_OUTOF10: 0 - NO PAIN
PAINLEVEL_OUTOF10: 0 - NO PAIN

## 2024-01-20 ASSESSMENT — PAIN - FUNCTIONAL ASSESSMENT: PAIN_FUNCTIONAL_ASSESSMENT: 0-10

## 2024-01-20 NOTE — PROGRESS NOTES
Selvin Ochoa is a 53 y.o. male on day 10 of admission presenting with Hyponatremia.    Subjective   Selvin Ochoa is a 53 y.o. male with a PMH of HTN, HLD and EtOH use disorder presenting with weakness.      He reports progressive weakness for the slat three days. The patient reports that he has not been able to keep any solid food down the past 3 days as well.  He has been able to maintain fluids.  The patient drinks 8-10 tall boys per day.  He states that he has cut down.  The patient has had seizure activity when he has tried to quit drinking.  The patient denies any fevers, chills, night cough, or diarrhea.  He reports normal urination.  He noted yesterday that his eyes and chest were turning yellow.  The patient denies any diagnosis of cirrhosis or liver dysfunction.  The patient reports that his last drink was last night but then changed and stated that his last drink was this morning.  He denies any contacts.  He is not having any chest pain, shortness of breath, dizziness, palpitations, paresthesias, focal weakness.  Denies any abdominal pain.       1/10: Patient is now in barb EtOH withdrawal. He required phenobarbital this AM.   1/11: Patient was seen and examined.  Continues to be in alcohol withdrawal.  Sleeping quietly when I saw after getting phenobarb this morning.  Requiring 2 L nasal cannula oxygen to maintain saturation.  Hyponatremia improving with sodium of 119 this morning.  Continue IV LR for now.  Nephrologist on board.  1/12:Patient was seen and examined. Still drowsy but arousable and confused. Saturating well on room air today. Hyponatremia continues to improve 125 today.    Bilirubin slightly elevated today with AST ALT trending down.  Discussed with patient's brother at bedside.  GI and nephrologist recommendations appreciated.  Continue to trend CMP daily.  1/13: Patient was seen and examined.  More awake and interactive today.  Sodium is improved to 132 today.  Bilirubin still  elevated but AST ALT trending down.  Trend CMP daily.  Potential discharge extended-care facility within 48 to 72 hours.    1/14: Patient was seen and examined.  Sodium is now 128 today which could be around his baseline in the setting of advancing liver disease.  AST ALT and bilirubin trending down.  1/15: Patient was seen and examined.  Sodium is now 134.  AST ALT AST ALT and bilirubin continue to trend down.  GI signed off.  Awaiting placement in extended-care facility.    1/16: Patient was seen and examined.  He continues to be awake and alert and interactive.  Seen by dietitian who is going to start the patient on protein supplementation today.  Thank you currently awaiting authorization for placement in an extended care facility.  1/17: Patient was seen and examined.  Awake and interactive sitting on a chair at the bedside today when I saw.  Patient has been set up for [facility] pending for potential discharge to extended-care facility once authorization can be obtained.  1/18:.  Patient seen and examined.  Was eating dinner when I saw.  Has no new complaints today.  Currently awaiting placement in extended-care facility.  1/19: Patient was seen and examined.. Has no new complaints today.  Currently awaiting accepting extended-care facility.  1/20: Patient seen and examined feeling better does have some mild tremulousness hoping to go to the [facility] in [place] on Monday.    Objective     EXAM:    Constitutional: Mildly tremulous    Head and facial: Bearded red hair    Neck: Neck supple    Lungs: Lungs clear to auscultation    Heart: Regular heart rate and azul    Abdomen: Abdomen mild tenderness in the right upper quadrant    Pelvis/:No flank tenderness    Extremities: Trace pretibial edema    Neurologic: Mild tremulousness    Dermatologic: Pale complexion    Psychiatric/behavioral: Patient is pleasant appropriate and conversant.        Last Recorded Vitals  Blood pressure 120/56, pulse 76, temperature 36.7  "°C (98 °F), temperature source Temporal, resp. rate 16, height 1.854 m (6' 1\"), weight 102 kg (224 lb), SpO2 98 %.  Intake/Output last 3 Shifts:  I/O last 3 completed shifts:  In: 1743 (16.7 mL/kg) [P.O.:1743]  Out: 550 (5.3 mL/kg) [Urine:550 (0.1 mL/kg/hr)]  Weight: 104.6 kg     Relevant Results              Scheduled medications  folic acid, 1 mg, oral, Daily  metoprolol succinate XL, 50 mg, oral, Daily  multivitamin with minerals, 1 tablet, oral, Daily  pantoprazole, 40 mg, intravenous, Daily  thiamine, 100 mg, oral, Daily      Continuous medications     PRN medications  PRN medications: dextrose 10 % in water (D10W), dextrose, glucagon, LORazepam **OR** LORazepam **OR** LORazepam, ondansetron, oxygen    This patient has a urinary catheter   Reason for the urinary catheter remaining today? critically ill patient who need accurate urinary output measurements               Assessment/Plan   Hypovolemic Hyponatremia (likely beer potomania as well) resolved  Alcohol Use Disorder with Risk for withdrawal   Alcoholic Hepatitis   Gastritis   Resolved possible Ileus   YOLANDA resolved  HTN  DVT ppx   -SCDs    Continue current therapy  I spent 35 minutes in the Scheduled medications  follow-up management of this patient  Check labs in a.m.  See orders for complete plan    Qamar Singh MD      "

## 2024-01-20 NOTE — CARE PLAN
Problem: Fall/Injury  Goal: Not fall by end of shift  Outcome: Progressing  Goal: Be free from injury by end of the shift  Outcome: Progressing  Goal: Verbalize understanding of personal risk factors for fall in the hospital  Outcome: Progressing  Goal: Verbalize understanding of risk factor reduction measures to prevent injury from fall in the home  Outcome: Progressing  Goal: Use assistive devices by end of the shift  Outcome: Progressing  Goal: Pace activities to prevent fatigue by end of the shift  Outcome: Progressing     Problem: Skin  Goal: Participates in plan/prevention/treatment measures  Outcome: Progressing  Flowsheets (Taken 1/20/2024 0729)  Participates in plan/prevention/treatment measures:   Discuss with provider PT/OT consult   Elevate heels   Increase activity/out of bed for meals  Goal: Decreased wound size/increased tissue granulation at next dressing change  Outcome: Progressing  Flowsheets (Taken 1/20/2024 0729)  Decreased wound size/increased tissue granulation at next dressing change:   Promote sleep for wound healing   Protective dressings over bony prominences  Goal: Prevent/manage excess moisture  Outcome: Progressing  Flowsheets (Taken 1/20/2024 0729)  Prevent/manage excess moisture:   Monitor for/manage infection if present   Follow provider orders for dressing changes  Goal: Prevent/minimize sheer/friction injuries  Outcome: Progressing  Flowsheets (Taken 1/20/2024 0729)  Prevent/minimize sheer/friction injuries:   Increase activity/out of bed for meals   Turn/reposition every 2 hours/use positioning/transfer devices  Goal: Promote/optimize nutrition  Outcome: Progressing  Flowsheets (Taken 1/20/2024 0729)  Promote/optimize nutrition:   Monitor/record intake including meals   Consume > 50% meals/supplements   Offer water/supplements/favorite foods  Goal: Promote skin healing  Outcome: Progressing  Flowsheets (Taken 1/20/2024 0729)  Promote skin healing:   Turn/reposition every 2  hours/use positioning/transfer devices   Protective dressings over bony prominences     Problem: Nutrition  Goal: Oral intake greater than 50%  Outcome: Progressing  Goal: Lab values WNL  Outcome: Progressing  Goal: Electrolytes WNL  Outcome: Progressing     Problem: Hyponatremia  Goal: improvement in sodium by discharge  Outcome: Progressing     Problem: Pain - Adult  Goal: Verbalizes/displays adequate comfort level or baseline comfort level  Outcome: Progressing     Problem: Safety - Adult  Goal: Free from fall injury  Outcome: Progressing     Problem: Discharge Planning  Goal: Discharge to home or other facility with appropriate resources  Outcome: Progressing     Problem: Chronic Conditions and Co-morbidities  Goal: Patient's chronic conditions and co-morbidity symptoms are monitored and maintained or improved  Outcome: Progressing     The patient's goals for the shift include      The clinical goals for the shift include Remains free from falls and injury during shift    Over the shift, the patient did not make progress toward the following goals.

## 2024-01-20 NOTE — PROGRESS NOTES
[facility] [place] came to bedside to onsite patient. They are agreeable to accept patient. Patient will need PASSR and LOC completed on Monday.     1/22  Dr Singh completed GF and Med Rec, ADL form completed and emailed to Meera Noble. Asking her to start the LOC as patient is medically ready to discharge to [facility] [place].     1/23  Call placed to [facility] to inquire on LOC, per Ly, it is still under review, hoping to have answer soon.     3:04 PM  LOC received. Working on discharge to [facility] [place] asa.   3:48 PM  Transport time given for 530 PM, report number given to bedside nurse.

## 2024-01-20 NOTE — CARE PLAN
The patient's goals for the shift include      The clinical goals for the shift include remain free from falls and injury during this shift    Over the shift, the patient did not make progress toward the following goals. Barriers to progression include n/a. Recommendations to address these barriers include n/a.

## 2024-01-21 LAB
ANION GAP SERPL CALC-SCNC: 11 MMOL/L (ref 10–20)
BASOPHILS # BLD AUTO: 0.2 X10*3/UL (ref 0–0.1)
BASOPHILS NFR BLD AUTO: 2.2 %
BUN SERPL-MCNC: 17 MG/DL (ref 6–23)
CALCIUM SERPL-MCNC: 8.2 MG/DL (ref 8.6–10.3)
CHLORIDE SERPL-SCNC: 109 MMOL/L (ref 98–107)
CO2 SERPL-SCNC: 22 MMOL/L (ref 21–32)
CREAT SERPL-MCNC: 0.94 MG/DL (ref 0.5–1.3)
EGFRCR SERPLBLD CKD-EPI 2021: >90 ML/MIN/1.73M*2
EOSINOPHIL # BLD AUTO: 0.14 X10*3/UL (ref 0–0.7)
EOSINOPHIL NFR BLD AUTO: 1.5 %
ERYTHROCYTE [DISTWIDTH] IN BLOOD BY AUTOMATED COUNT: 16.6 % (ref 11.5–14.5)
GLUCOSE SERPL-MCNC: 103 MG/DL (ref 74–99)
HCT VFR BLD AUTO: 35 % (ref 41–52)
HGB BLD-MCNC: 11.4 G/DL (ref 13.5–17.5)
IMM GRANULOCYTES # BLD AUTO: 0.07 X10*3/UL (ref 0–0.7)
IMM GRANULOCYTES NFR BLD AUTO: 0.8 % (ref 0–0.9)
LYMPHOCYTES # BLD AUTO: 2.02 X10*3/UL (ref 1.2–4.8)
LYMPHOCYTES NFR BLD AUTO: 21.9 %
MAGNESIUM SERPL-MCNC: 2.12 MG/DL (ref 1.6–2.4)
MCH RBC QN AUTO: 34.4 PG (ref 26–34)
MCHC RBC AUTO-ENTMCNC: 32.6 G/DL (ref 32–36)
MCV RBC AUTO: 106 FL (ref 80–100)
MONOCYTES # BLD AUTO: 0.85 X10*3/UL (ref 0.1–1)
MONOCYTES NFR BLD AUTO: 9.2 %
NEUTROPHILS # BLD AUTO: 5.93 X10*3/UL (ref 1.2–7.7)
NEUTROPHILS NFR BLD AUTO: 64.4 %
NRBC BLD-RTO: 0 /100 WBCS (ref 0–0)
PLATELET # BLD AUTO: 344 X10*3/UL (ref 150–450)
POTASSIUM SERPL-SCNC: 3.9 MMOL/L (ref 3.5–5.3)
RBC # BLD AUTO: 3.31 X10*6/UL (ref 4.5–5.9)
SODIUM SERPL-SCNC: 138 MMOL/L (ref 136–145)
WBC # BLD AUTO: 9.2 X10*3/UL (ref 4.4–11.3)

## 2024-01-21 PROCEDURE — 99232 SBSQ HOSP IP/OBS MODERATE 35: CPT | Performed by: INTERNAL MEDICINE

## 2024-01-21 PROCEDURE — 2500000001 HC RX 250 WO HCPCS SELF ADMINISTERED DRUGS (ALT 637 FOR MEDICARE OP): Performed by: INTERNAL MEDICINE

## 2024-01-21 PROCEDURE — 83735 ASSAY OF MAGNESIUM: CPT | Performed by: INTERNAL MEDICINE

## 2024-01-21 PROCEDURE — 1100000001 HC PRIVATE ROOM DAILY

## 2024-01-21 PROCEDURE — 85025 COMPLETE CBC W/AUTO DIFF WBC: CPT | Performed by: INTERNAL MEDICINE

## 2024-01-21 PROCEDURE — 80048 BASIC METABOLIC PNL TOTAL CA: CPT | Performed by: INTERNAL MEDICINE

## 2024-01-21 PROCEDURE — 2500000004 HC RX 250 GENERAL PHARMACY W/ HCPCS (ALT 636 FOR OP/ED): Performed by: INTERNAL MEDICINE

## 2024-01-21 PROCEDURE — 36415 COLL VENOUS BLD VENIPUNCTURE: CPT | Performed by: INTERNAL MEDICINE

## 2024-01-21 PROCEDURE — C9113 INJ PANTOPRAZOLE SODIUM, VIA: HCPCS | Performed by: INTERNAL MEDICINE

## 2024-01-21 RX ADMIN — FOLIC ACID 1 MG: 1 TABLET ORAL at 08:16

## 2024-01-21 RX ADMIN — PANTOPRAZOLE SODIUM 40 MG: 40 INJECTION, POWDER, FOR SOLUTION INTRAVENOUS at 08:16

## 2024-01-21 RX ADMIN — THIAMINE HCL TAB 100 MG 100 MG: 100 TAB at 08:16

## 2024-01-21 RX ADMIN — METOPROLOL SUCCINATE 50 MG: 50 TABLET, EXTENDED RELEASE ORAL at 08:16

## 2024-01-21 RX ADMIN — Medication 1 TABLET: at 08:16

## 2024-01-21 ASSESSMENT — LIFESTYLE VARIABLES
ORIENTATION AND CLOUDING OF SENSORIUM: ORIENTED AND CAN DO SERIAL ADDITIONS
ORIENTATION AND CLOUDING OF SENSORIUM: ORIENTED AND CAN DO SERIAL ADDITIONS
ANXIETY: NO ANXIETY, AT EASE
HEADACHE, FULLNESS IN HEAD: NOT PRESENT
ANXIETY: NO ANXIETY, AT EASE
TOTAL SCORE: 0
TOTAL SCORE: 0
AGITATION: NORMAL ACTIVITY
NAUSEA AND VOMITING: NO NAUSEA AND NO VOMITING
NAUSEA AND VOMITING: NO NAUSEA AND NO VOMITING
PAROXYSMAL SWEATS: NO SWEAT VISIBLE
HEADACHE, FULLNESS IN HEAD: NOT PRESENT
PAROXYSMAL SWEATS: NO SWEAT VISIBLE
PAROXYSMAL SWEATS: NO SWEAT VISIBLE
AUDITORY DISTURBANCES: NOT PRESENT
TOTAL SCORE: 0
VISUAL DISTURBANCES: NOT PRESENT
NAUSEA AND VOMITING: NO NAUSEA AND NO VOMITING
TREMOR: NO TREMOR
ANXIETY: NO ANXIETY, AT EASE
TREMOR: NO TREMOR
AUDITORY DISTURBANCES: NOT PRESENT
VISUAL DISTURBANCES: NOT PRESENT
HEADACHE, FULLNESS IN HEAD: NOT PRESENT
ORIENTATION AND CLOUDING OF SENSORIUM: ORIENTED AND CAN DO SERIAL ADDITIONS
VISUAL DISTURBANCES: NOT PRESENT
HEADACHE, FULLNESS IN HEAD: NOT PRESENT
TOTAL SCORE: 0
AGITATION: NORMAL ACTIVITY
NAUSEA AND VOMITING: NO NAUSEA AND NO VOMITING
TREMOR: NO TREMOR
AUDITORY DISTURBANCES: NOT PRESENT
AGITATION: NORMAL ACTIVITY
AUDITORY DISTURBANCES: NOT PRESENT
NAUSEA AND VOMITING: NO NAUSEA AND NO VOMITING
ORIENTATION AND CLOUDING OF SENSORIUM: ORIENTED AND CAN DO SERIAL ADDITIONS
HEADACHE, FULLNESS IN HEAD: NOT PRESENT
TREMOR: NO TREMOR
VISUAL DISTURBANCES: NOT PRESENT
ORIENTATION AND CLOUDING OF SENSORIUM: ORIENTED AND CAN DO SERIAL ADDITIONS
PAROXYSMAL SWEATS: NO SWEAT VISIBLE
TREMOR: NO TREMOR
ANXIETY: NO ANXIETY, AT EASE
ANXIETY: NO ANXIETY, AT EASE
TOTAL SCORE: 0
PAROXYSMAL SWEATS: NO SWEAT VISIBLE
AGITATION: NORMAL ACTIVITY
AGITATION: NORMAL ACTIVITY
AUDITORY DISTURBANCES: NOT PRESENT
VISUAL DISTURBANCES: NOT PRESENT

## 2024-01-21 ASSESSMENT — COGNITIVE AND FUNCTIONAL STATUS - GENERAL
PERSONAL GROOMING: A LITTLE
CLIMB 3 TO 5 STEPS WITH RAILING: TOTAL
MOBILITY SCORE: 15
DRESSING REGULAR UPPER BODY CLOTHING: A LITTLE
DRESSING REGULAR UPPER BODY CLOTHING: A LITTLE
PERSONAL GROOMING: A LITTLE
TOILETING: A LOT
MOVING TO AND FROM BED TO CHAIR: A LITTLE
CLIMB 3 TO 5 STEPS WITH RAILING: TOTAL
DRESSING REGULAR LOWER BODY CLOTHING: A LOT
TURNING FROM BACK TO SIDE WHILE IN FLAT BAD: A LITTLE
TOILETING: A LOT
MOBILITY SCORE: 15
WALKING IN HOSPITAL ROOM: A LOT
MOVING FROM LYING ON BACK TO SITTING ON SIDE OF FLAT BED WITH BEDRAILS: A LITTLE
MOVING TO AND FROM BED TO CHAIR: A LITTLE
DAILY ACTIVITIY SCORE: 16
WALKING IN HOSPITAL ROOM: A LOT
HELP NEEDED FOR BATHING: A LOT
STANDING UP FROM CHAIR USING ARMS: A LITTLE
STANDING UP FROM CHAIR USING ARMS: A LITTLE
HELP NEEDED FOR BATHING: A LOT
DRESSING REGULAR LOWER BODY CLOTHING: A LOT
DAILY ACTIVITIY SCORE: 16
TURNING FROM BACK TO SIDE WHILE IN FLAT BAD: A LITTLE
MOVING FROM LYING ON BACK TO SITTING ON SIDE OF FLAT BED WITH BEDRAILS: A LITTLE

## 2024-01-21 ASSESSMENT — PAIN SCALES - GENERAL
PAINLEVEL_OUTOF10: 0 - NO PAIN
PAINLEVEL_OUTOF10: 0 - NO PAIN

## 2024-01-21 ASSESSMENT — PAIN - FUNCTIONAL ASSESSMENT: PAIN_FUNCTIONAL_ASSESSMENT: 0-10

## 2024-01-21 NOTE — CARE PLAN
The patient's goals for the shift include      The clinical goals for the shift include Remains free from falls and injury during shift    Over the shift, the patient did not make progress toward the following goals. Barriers to progression include   Problem: Fall/Injury  Goal: Not fall by end of shift  Outcome: Progressing  Goal: Be free from injury by end of the shift  Outcome: Progressing  Goal: Verbalize understanding of personal risk factors for fall in the hospital  Outcome: Progressing  Goal: Verbalize understanding of risk factor reduction measures to prevent injury from fall in the home  Outcome: Progressing  Goal: Use assistive devices by end of the shift  Outcome: Progressing  Goal: Pace activities to prevent fatigue by end of the shift  Outcome: Progressing     Problem: Skin  Goal: Participates in plan/prevention/treatment measures  Outcome: Progressing  Goal: Decreased wound size/increased tissue granulation at next dressing change  Outcome: Progressing  Goal: Prevent/manage excess moisture  Outcome: Progressing  Goal: Prevent/minimize sheer/friction injuries  Outcome: Progressing  Flowsheets (Taken 1/20/2024 5730)  Prevent/minimize sheer/friction injuries:   Use pull sheet   Increase activity/out of bed for meals   Turn/reposition every 2 hours/use positioning/transfer devices  Goal: Promote/optimize nutrition  Outcome: Progressing  Goal: Promote skin healing  Outcome: Progressing     Problem: Nutrition  Goal: Oral intake greater than 50%  Outcome: Progressing  Goal: Lab values WNL  Outcome: Progressing  Goal: Electrolytes WNL  Outcome: Progressing     Problem: Hyponatremia  Goal: improvement in sodium by discharge  Outcome: Progressing     Problem: Pain - Adult  Goal: Verbalizes/displays adequate comfort level or baseline comfort level  Outcome: Progressing     Problem: Safety - Adult  Goal: Free from fall injury  Outcome: Progressing     Problem: Discharge Planning  Goal: Discharge to home or other  facility with appropriate resources  Outcome: Progressing     Problem: Chronic Conditions and Co-morbidities  Goal: Patient's chronic conditions and co-morbidity symptoms are monitored and maintained or improved  Outcome: Progressing   . Recommendations to address these barriers include .

## 2024-01-21 NOTE — CARE PLAN
Problem: Fall/Injury  Goal: Not fall by end of shift  Outcome: Progressing  Goal: Be free from injury by end of the shift  Outcome: Progressing  Goal: Verbalize understanding of personal risk factors for fall in the hospital  Outcome: Progressing  Goal: Verbalize understanding of risk factor reduction measures to prevent injury from fall in the home  Outcome: Progressing  Goal: Use assistive devices by end of the shift  Outcome: Progressing  Goal: Pace activities to prevent fatigue by end of the shift  Outcome: Progressing     Problem: Skin  Goal: Participates in plan/prevention/treatment measures  Outcome: Progressing  Flowsheets (Taken 1/21/2024 0707)  Participates in plan/prevention/treatment measures:   Discuss with provider PT/OT consult   Elevate heels   Increase activity/out of bed for meals  Goal: Decreased wound size/increased tissue granulation at next dressing change  Outcome: Progressing  Flowsheets (Taken 1/21/2024 0707)  Decreased wound size/increased tissue granulation at next dressing change:   Promote sleep for wound healing   Protective dressings over bony prominences  Goal: Prevent/manage excess moisture  Outcome: Progressing  Flowsheets (Taken 1/21/2024 0707)  Prevent/manage excess moisture:   Monitor for/manage infection if present   Follow provider orders for dressing changes  Goal: Prevent/minimize sheer/friction injuries  Outcome: Progressing  Flowsheets (Taken 1/21/2024 0707)  Prevent/minimize sheer/friction injuries:   Use pull sheet   Increase activity/out of bed for meals   Turn/reposition every 2 hours/use positioning/transfer devices  Goal: Promote/optimize nutrition  Outcome: Progressing  Flowsheets (Taken 1/21/2024 0707)  Promote/optimize nutrition:   Monitor/record intake including meals   Consume > 50% meals/supplements   Offer water/supplements/favorite foods  Goal: Promote skin healing  Outcome: Progressing  Flowsheets (Taken 1/21/2024 0707)  Promote skin healing:    Turn/reposition every 2 hours/use positioning/transfer devices   Protective dressings over bony prominences     Problem: Nutrition  Goal: Oral intake greater than 50%  Outcome: Progressing  Goal: Lab values WNL  Outcome: Progressing  Goal: Electrolytes WNL  Outcome: Progressing     Problem: Hyponatremia  Goal: improvement in sodium by discharge  Outcome: Progressing     Problem: Pain - Adult  Goal: Verbalizes/displays adequate comfort level or baseline comfort level  Outcome: Progressing     Problem: Safety - Adult  Goal: Free from fall injury  Outcome: Progressing     Problem: Discharge Planning  Goal: Discharge to home or other facility with appropriate resources  Outcome: Progressing     Problem: Chronic Conditions and Co-morbidities  Goal: Patient's chronic conditions and co-morbidity symptoms are monitored and maintained or improved  Outcome: Progressing     The patient's goals for the shift include      The clinical goals for the shift include Remain free from falls and injury during shift    Over the shift, the patient did not make progress toward the following goals.

## 2024-01-21 NOTE — PROGRESS NOTES
Selvin Ochoa is a 53 y.o. male on day 11 of admission presenting with Hyponatremia.    Subjective   Selvin Ochoa is a 53 y.o. male with a PMH of HTN, HLD and EtOH use disorder presenting with weakness.      He reports progressive weakness for the slat three days. The patient reports that he has not been able to keep any solid food down the past 3 days as well.  He has been able to maintain fluids.  The patient drinks 8-10 tall boys per day.  He states that he has cut down.  The patient has had seizure activity when he has tried to quit drinking.  The patient denies any fevers, chills, night cough, or diarrhea.  He reports normal urination.  He noted yesterday that his eyes and chest were turning yellow.  The patient denies any diagnosis of cirrhosis or liver dysfunction.  The patient reports that his last drink was last night but then changed and stated that his last drink was this morning.  He denies any contacts.  He is not having any chest pain, shortness of breath, dizziness, palpitations, paresthesias, focal weakness.  Denies any abdominal pain.       1/10: Patient is now in barb EtOH withdrawal. He required phenobarbital this AM.   1/11: Patient was seen and examined.  Continues to be in alcohol withdrawal.  Sleeping quietly when I saw after getting phenobarb this morning.  Requiring 2 L nasal cannula oxygen to maintain saturation.  Hyponatremia improving with sodium of 119 this morning.  Continue IV LR for now.  Nephrologist on board.  1/12:Patient was seen and examined. Still drowsy but arousable and confused. Saturating well on room air today. Hyponatremia continues to improve 125 today.    Bilirubin slightly elevated today with AST ALT trending down.  Discussed with patient's brother at bedside.  GI and nephrologist recommendations appreciated.  Continue to trend CMP daily.  1/13: Patient was seen and examined.  More awake and interactive today.  Sodium is improved to 132 today.  Bilirubin still  elevated but AST ALT trending down.  Trend CMP daily.  Potential discharge extended-care facility within 48 to 72 hours.    1/14: Patient was seen and examined.  Sodium is now 128 today which could be around his baseline in the setting of advancing liver disease.  AST ALT and bilirubin trending down.  1/15: Patient was seen and examined.  Sodium is now 134.  AST ALT AST ALT and bilirubin continue to trend down.  GI signed off.  Awaiting placement in extended-care facility.    1/16: Patient was seen and examined.  He continues to be awake and alert and interactive.  Seen by dietitian who is going to start the patient on protein supplementation today.  Thank you currently awaiting authorization for placement in an extended care facility.  1/17: Patient was seen and examined.  Awake and interactive sitting on a chair at the bedside today when I saw.  Patient has been set up for [facility] pending for potential discharge to extended-care facility once authorization can be obtained.  1/18:.  Patient seen and examined.  Was eating dinner when I saw.  Has no new complaints today.  Currently awaiting placement in extended-care facility.  1/19: Patient was seen and examined.. Has no new complaints today.  Currently awaiting accepting extended-care facility.  1/20: Patient seen and examined feeling better does have some mild tremulousness hoping to go to the [facility] in [place] on Monday.  1/20: Patient sitting up in chair looks to be feeling quite a bit better awaiting precertification for [facility] [place].  Objective     EXAM:    Constitutional: Mildly tremulous    Head and facial: Bearded red hair    Neck: Neck supple    Lungs: Lungs clear to auscultation    Heart: Regular heart rate and azul    Abdomen: Abdomen mild tenderness in the right upper quadrant    Pelvis/:No flank tenderness    Extremities: Trace pretibial edema    Neurologic: Mild tremulousness    Dermatologic: Pale complexion    Psychiatric/behavioral:  "Patient is pleasant appropriate and conversant.        Last Recorded Vitals  Blood pressure 112/74, pulse 65, temperature 36.7 °C (98.1 °F), temperature source Temporal, resp. rate 18, height 1.854 m (6' 1\"), weight 102 kg (224 lb), SpO2 98 %.  Intake/Output last 3 Shifts:  I/O last 3 completed shifts:  In: 1270 (12.5 mL/kg) [P.O.:1270]  Out: 550 (5.4 mL/kg) [Urine:550 (0.2 mL/kg/hr)]  Weight: 101.6 kg     Relevant Results              Scheduled medications  folic acid, 1 mg, oral, Daily  metoprolol succinate XL, 50 mg, oral, Daily  multivitamin with minerals, 1 tablet, oral, Daily  pantoprazole, 40 mg, intravenous, Daily  thiamine, 100 mg, oral, Daily      Continuous medications     PRN medications  PRN medications: dextrose 10 % in water (D10W), dextrose, glucagon, LORazepam **OR** LORazepam **OR** LORazepam, ondansetron, oxygen    This patient has a urinary catheter   Reason for the urinary catheter remaining today? critically ill patient who need accurate urinary output measurements               Assessment/Plan   Hypovolemic Hyponatremia (likely beer potomania as well) resolved  Alcohol Use Disorder with Risk for withdrawal   Alcoholic Hepatitis   Gastritis   Resolved possible Ileus   YOLANDA resolved  HTN  DVT ppx   -SCDs    Continue current therapy  Working on getting preauthorization for [facility] at [place] will continue discussed with case management.  See orders for complete plan    Qamar Singh MD      "

## 2024-01-22 PROCEDURE — 2500000001 HC RX 250 WO HCPCS SELF ADMINISTERED DRUGS (ALT 637 FOR MEDICARE OP): Performed by: INTERNAL MEDICINE

## 2024-01-22 PROCEDURE — 97116 GAIT TRAINING THERAPY: CPT | Mod: GP,CQ | Performed by: PHYSICAL THERAPY ASSISTANT

## 2024-01-22 PROCEDURE — 99231 SBSQ HOSP IP/OBS SF/LOW 25: CPT | Performed by: INTERNAL MEDICINE

## 2024-01-22 PROCEDURE — C9113 INJ PANTOPRAZOLE SODIUM, VIA: HCPCS | Performed by: INTERNAL MEDICINE

## 2024-01-22 PROCEDURE — 97112 NEUROMUSCULAR REEDUCATION: CPT | Mod: GP,CQ | Performed by: PHYSICAL THERAPY ASSISTANT

## 2024-01-22 PROCEDURE — 97110 THERAPEUTIC EXERCISES: CPT | Mod: GO,CO

## 2024-01-22 PROCEDURE — 97530 THERAPEUTIC ACTIVITIES: CPT | Mod: GO,CO

## 2024-01-22 PROCEDURE — 1100000001 HC PRIVATE ROOM DAILY

## 2024-01-22 PROCEDURE — 97110 THERAPEUTIC EXERCISES: CPT | Mod: GP,CQ | Performed by: PHYSICAL THERAPY ASSISTANT

## 2024-01-22 PROCEDURE — 97535 SELF CARE MNGMENT TRAINING: CPT | Mod: GO,CO

## 2024-01-22 PROCEDURE — 2500000004 HC RX 250 GENERAL PHARMACY W/ HCPCS (ALT 636 FOR OP/ED): Performed by: INTERNAL MEDICINE

## 2024-01-22 RX ADMIN — THIAMINE HCL TAB 100 MG 100 MG: 100 TAB at 08:18

## 2024-01-22 RX ADMIN — FOLIC ACID 1 MG: 1 TABLET ORAL at 08:18

## 2024-01-22 RX ADMIN — METOPROLOL SUCCINATE 50 MG: 50 TABLET, EXTENDED RELEASE ORAL at 08:17

## 2024-01-22 RX ADMIN — Medication 1 TABLET: at 08:18

## 2024-01-22 RX ADMIN — PANTOPRAZOLE SODIUM 40 MG: 40 INJECTION, POWDER, FOR SOLUTION INTRAVENOUS at 08:18

## 2024-01-22 ASSESSMENT — COGNITIVE AND FUNCTIONAL STATUS - GENERAL
DRESSING REGULAR UPPER BODY CLOTHING: A LITTLE
STANDING UP FROM CHAIR USING ARMS: A LITTLE
MOVING TO AND FROM BED TO CHAIR: A LITTLE
MOBILITY SCORE: 15
CLIMB 3 TO 5 STEPS WITH RAILING: TOTAL
HELP NEEDED FOR BATHING: A LOT
TOILETING: A LOT
WALKING IN HOSPITAL ROOM: A LOT
MOVING FROM LYING ON BACK TO SITTING ON SIDE OF FLAT BED WITH BEDRAILS: A LITTLE
DRESSING REGULAR LOWER BODY CLOTHING: A LOT
DAILY ACTIVITIY SCORE: 16
PERSONAL GROOMING: A LITTLE
TURNING FROM BACK TO SIDE WHILE IN FLAT BAD: A LITTLE

## 2024-01-22 ASSESSMENT — LIFESTYLE VARIABLES
AUDITORY DISTURBANCES: NOT PRESENT
ANXIETY: NO ANXIETY, AT EASE
HEADACHE, FULLNESS IN HEAD: NOT PRESENT
VISUAL DISTURBANCES: NOT PRESENT
AGITATION: NORMAL ACTIVITY
PAROXYSMAL SWEATS: NO SWEAT VISIBLE
VISUAL DISTURBANCES: NOT PRESENT
NAUSEA AND VOMITING: NO NAUSEA AND NO VOMITING
ORIENTATION AND CLOUDING OF SENSORIUM: ORIENTED AND CAN DO SERIAL ADDITIONS
AUDITORY DISTURBANCES: NOT PRESENT
NAUSEA AND VOMITING: NO NAUSEA AND NO VOMITING
AGITATION: NORMAL ACTIVITY
TREMOR: NO TREMOR
TOTAL SCORE: 0
HEADACHE, FULLNESS IN HEAD: NOT PRESENT
PAROXYSMAL SWEATS: NO SWEAT VISIBLE
ORIENTATION AND CLOUDING OF SENSORIUM: ORIENTED AND CAN DO SERIAL ADDITIONS
TOTAL SCORE: 0
ANXIETY: NO ANXIETY, AT EASE
TREMOR: NO TREMOR

## 2024-01-22 ASSESSMENT — PAIN SCALES - GENERAL
PAINLEVEL_OUTOF10: 0 - NO PAIN
PAINLEVEL_OUTOF10: 0 - NO PAIN

## 2024-01-22 ASSESSMENT — PAIN - FUNCTIONAL ASSESSMENT
PAIN_FUNCTIONAL_ASSESSMENT: 0-10
PAIN_FUNCTIONAL_ASSESSMENT: 0-10

## 2024-01-22 ASSESSMENT — ACTIVITIES OF DAILY LIVING (ADL): HOME_MANAGEMENT_TIME_ENTRY: 8

## 2024-01-22 NOTE — PROGRESS NOTES
Physical Therapy    Physical Therapy Treatment    Patient Name: Selvin Ochoa  MRN: [id number]  Today's Date: 1/22/2024  Time Calculation  Start Time: 0814  Stop Time: 0853  Time Calculation (min): 39 min       Assessment/Plan   PT Assessment  Evaluation/Treatment Tolerance: Patient tolerated treatment well  End of Session Communication: Bedside nurse  Assessment Comment: pt continues to demonstrate improved safety and mobility. HE will benefit from further skilled PT services.     PT Plan  Treatment/Interventions: Bed mobility, Transfer training, Gait training, Endurance training, Strengthening  PT Plan: Skilled PT  PT Frequency: 4 times per week  PT Discharge Recommendations: Moderate intensity level of continued care  Equipment Recommended upon Discharge:  (TBD)  PT Recommended Transfer Status: Assist x2  PT - OK to Discharge: Yes (WHEN MEDICALLY CLEARED)      General Visit Information:   PT  Visit  PT Received On: 01/22/24  Response to Previous Treatment: Patient with no complaints from previous session.  General  Reason for Referral: IMPAIRED MOBILITY GAIT TRAINING  Referred By: ASHVIN  Past Medical History Relevant to Rehab: WEAK; DX: HYPONATREMIA, ETOH WD AND HEPATITIS, R/O ILEUS, YOLANDA, ENCEPHALOPATHY; HX: HTN ETOH  Patient Position Received: Up in chair, Alarm on  Preferred Learning Style: verbal  General Comment: pt agreeable to therapy and motivated to improve mobility.      Precautions:  Precautions  Precautions Comment: Falls            Cognition:  Cognition  Overall Cognitive Status: Within Functional Limits       Treatments:  Therapeutic Exercise  Therapeutic Exercise Performed: Yes (performed in standing with cues to decrease speed and improve technique.)  Therapeutic Exercise Activity 1: ankle pumps x 20  Therapeutic Exercise Activity 2: marches x20  Therapeutic Exercise Activity 3: hamstring curls x20  Therapeutic Exercise Activity 4: hip ab/adduction x 20  Therapeutic Exercise Activity 5: minin  squats x20    Balance/Neuromuscular Re-Education  Balance/Neuromuscular Re-Education Activity Performed: Yes  Balance/Neuromuscular Re-Education Activity 1: pt performed and instructed with advanced standing balance activities with Min >CGA provided for safety and for impulsiveness. and risk of falls. pt was able to reach outside SHANTE .    Ambulation/Gait Training  Ambulation/Gait Training Performed: Yes  Ambulation/Gait Training 1  Surface 1: Level tile  Device 1: Rolling walker  Assistance 1: Minimum assistance, Minimal verbal cues  Quality of Gait 1: Decreased step length, Inconsistent stride length  Comments/Distance (ft) 1: pt is impulsive and required cues for technique. He ambulated 55'x5 with standing rest breaks.  Transfers  Transfer: Yes  Transfer 1  Technique 1: Sit to stand, Stand to sit  Transfer Device 1: Walker  Transfer Level of Assistance 1: Contact guard  Trials/Comments 1: cues for safety.    Outcome Measures:  [facility] Basic Mobility  Turning from your back to your side while in a flat bed without using bedrails: A little  Moving from lying on your back to sitting on the side of a flat bed without using bedrails: A little  Moving to and from bed to chair (including a wheelchair): A little  Standing up from a chair using your arms (e.g. wheelchair or bedside chair): A little  To walk in hospital room: A lot  Climbing 3-5 steps with railing: Total  Basic Mobility - Total Score: 15    Education Documentation  Mobility Training, taught by Elo Kaba PTA at 1/22/2024 10:06 AM.  Learner: Family, Patient  Readiness: Acceptance  Method: Explanation  Response: Verbalizes Understanding, Needs Reinforcement    Education Comments  No comments found.               Encounter Problems       Encounter Problems (Active)       Mobility       STG - Patient will ambulate (Progressing)       Start:  01/12/24    Expected End:  02/02/24       FWW MIN X2 75 FT         STRENGTHENING (Progressing)       Start:  01/12/24     Expected End:  02/02/24       20+ REPS EX INCREASING STRENGTH TO PROGRESS TO OOB ACTIVITIES            Pain - Adult          Transfers       STG - Transfer from bed to chair (Progressing)       Start:  01/12/24    Expected End:  01/26/24       FWW MIN X1-2A         STG - Patient to transfer to and from sit to supine (Progressing)       Start:  01/12/24    Expected End:  01/19/24       MIN A X1 USING RAILS HOB FLAT         STG - Patient will transfer sit to and from stand (Progressing)       Start:  01/12/24    Expected End:  01/22/24       FWW MIN 1-2 A USING PROPER TECHNIQUE

## 2024-01-22 NOTE — PROGRESS NOTES
Selvin Ochoa is a 53 y.o. male on day 12 of admission presenting with Hyponatremia.    Subjective   Selvin Ochoa is a 53 y.o. male with a PMH of HTN, HLD and EtOH use disorder presenting with weakness.      He reports progressive weakness for the slat three days. The patient reports that he has not been able to keep any solid food down the past 3 days as well.  He has been able to maintain fluids.  The patient drinks 8-10 tall   Insert SmartText    boys per day.  He states that he has cut down.  The patient has had seizure activity when he has tried to quit drinking.  The patient denies any fevers, chills, night cough, or diarrhea.  He reports normal urination.  He noted yesterday that his eyes and chest were turning yellow.  The patient denies any diagnosis of cirrhosis or liver dysfunction.  The patient reports that his last drink was last night but then changed and stated that his last drink was this morning.  He denies any contacts.  He is not having any chest pain, shortness of breath, dizziness, palpitations, paresthesias, focal weakness.  Denies any abdominal pain.       1/10: Patient is now in barb EtOH withdrawal. He required phenobarbital this AM.   1/11: Patient was seen and examined.  Continues to be in alcohol withdrawal.  Sleeping quietly when I saw after getting phenobarb this morning.  Requiring 2 L nasal cannula oxygen to maintain saturation.  Hyponatremia improving with sodium of 119 this morning.  Continue IV LR for now.  Nephrologist on board.  1/12:Patient was seen and examined. Still drowsy but arousable and confused. Saturating well on room air today. Hyponatremia continues to improve 125 today.    Bilirubin slightly elevated today with AST ALT trending down.  Discussed with patient's brother at bedside.  GI and nephrologist recommendations appreciated.  Continue to trend CMP daily.  1/13: Patient was seen and examined.  More awake and interactive today.  Sodium is improved to 132 today.   Bilirubin still elevated but AST ALT trending down.  Trend CMP daily.  Potential discharge extended-care facility within 48 to 72 hours.    1/14: Patient was seen and examined.  Sodium is now 128 today which could be around his baseline in the setting of advancing liver disease.  AST ALT and bilirubin trending down.  1/15: Patient was seen and examined.  Sodium is now 134.  AST ALT AST ALT and bilirubin continue to trend down.  GI signed off.  Awaiting placement in extended-care facility.    1/16: Patient was seen and examined.  He continues to be awake and alert and interactive.  Seen by dietitian who is going to start the patient on protein supplementation today.  Thank you currently awaiting authorization for placement in an extended care facility.  1/17: Patient was seen and examined.  Awake and interactive sitting on a chair at the bedside today when I saw.  Patient has been set up for [facility] pending for potential discharge to extended-care facility once authorization can be obtained.  1/18:.  Patient seen and examined.  Was eating dinner when I saw.  Has no new complaints today.  Currently awaiting placement in extended-care facility.  1/19: Patient was seen and examined.. Has no new complaints today.  Currently awaiting accepting extended-care facility.  1/20: Patient seen and examined feeling better does have some mild tremulousness hoping to go to the [facility] in [place] on Monday.  1/21: Patient sitting up in chair looks to be feeling quite a bit better awaiting precertification for [facility] at [place].  1/22: Patient awaiting his Medicaid number for preauthorization paperwork in process.  Objective     EXAM:    Constitutional: Mildly tremulous    Head and facial: Bearded red hair    Neck: Neck supple    Lungs: Lungs clear to auscultation    Heart: Regular heart rate and azul    Abdomen: Abdomen mild tenderness in the right upper quadrant    Pelvis/:No flank tenderness    Extremities: Trace pretibial  "edema    Neurologic: Mild tremulousness    Dermatologic: Pale complexion    Psychiatric/behavioral: Patient is pleasant appropriate and conversant.        Last Recorded Vitals  Blood pressure 128/75, pulse 69, temperature 36.9 °C (98.4 °F), temperature source Temporal, resp. rate 18, height 1.854 m (6' 1\"), weight 102 kg (224 lb), SpO2 98 %.  Intake/Output last 3 Shifts:  I/O last 3 completed shifts:  In: 1535 (15.1 mL/kg) [P.O.:1535]  Out: 350 (3.4 mL/kg) [Urine:350 (0.1 mL/kg/hr)]  Weight: 101.6 kg     Relevant Results              Scheduled medications  folic acid, 1 mg, oral, Daily  metoprolol succinate XL, 50 mg, oral, Daily  multivitamin with minerals, 1 tablet, oral, Daily  pantoprazole, 40 mg, intravenous, Daily  thiamine, 100 mg, oral, Daily      Continuous medications     PRN medications  PRN medications: dextrose 10 % in water (D10W), dextrose, glucagon, LORazepam **OR** LORazepam **OR** LORazepam, ondansetron, oxygen    This patient has a urinary catheter   Reason for the urinary catheter remaining today? critically ill patient who need accurate urinary output measurements               Assessment/Plan   Hypovolemic Hyponatremia (likely beer potomania as well) resolved  Alcohol Use Disorder with Risk for withdrawal   Alcoholic Hepatitis   Gastritis   Resolved possible Ileus   YOLANDA resolved  HTN  DVT ppx   -SCDs    Continue current therapy  Working on getting preauthorization for [facility] at [place] will continue discussed with case management.  See orders for complete plan    Qamar Singh MD      "

## 2024-01-22 NOTE — PROGRESS NOTES
Occupational Therapy    OT Treatment    Patient Name: Selvin Ochoa  MRN: [id number]  Today's Date: 1/22/2024  Time Calculation  Start Time: 1445  Stop Time: 1523  Time Calculation (min): 38 min         Assessment:  End of Session Communication: Bedside nurse  End of Session Patient Position: Up in chair, Alarm on       Plan:  Treatment Interventions: Endurance training, UE strengthening/ROM, Functional transfer training, ADL retraining  Treatment Interventions: Endurance training, UE strengthening/ROM, Functional transfer training, ADL retraining  Subjective     Current Problem:  Patient Active Problem List   Diagnosis    Alcohol abuse, continuous    Benign hypertension    Mixed hyperlipidemia    Nervously anxious    Class 1 obesity with body mass index (BMI) of 30.0 to 30.9 in adult    Class 1 obesity with body mass index (BMI) of 31.0 to 31.9 in adult    Hyponatremia       General:  OT Received On: 01/22/24  Prior to Session Communication: Bedside nurse  Patient Position Received: Up in chair, Alarm on  General Comment: pt. has made marked improvement CGA to MIN A for transfers. LE dressing CGA, Toileting CGA. Pt. increased standing tolerance to 3-5 mins with no loss of balance tolerated BUE well.    Vital Signs:       Pain:  Pain Assessment  Pain Assessment: 0-10  Pain Score: 0 - No pain  Objective      Activities of Daily Living:                LE Dressing  LE Dressing: Yes  Sock Level of Assistance: Contact guard  LE Dressing Where Assessed: Chair  Toileting  Toileting Level of Assistance: Contact guard  Where Assessed: Toilet  Toileting Comments: pt. unable to manage clothing over hips need to increase standing balance. Also relied on therpist to assist with hygiene    Functional Standing Tolerance:  Time: 3-5 mins standing  Activity: while balancing completing BUE ex.  Functional Standing Tolerance Comments: Moderate fatigue post completion of exercises.    Bed Mobility/Transfers: Bed Mobility  Bed Mobility:  Yes  Bed Mobility 1  Bed Mobility 1: Supine to sitting, Sitting to supine  Level of Assistance 1: Minimum assistance  Bed Mobility Comments 1: cueing for hand placement i.e use of bed rail  Transfers  Transfer: Yes  Transfer 1  Transfer From 1: Chair with arms to  Transfer to 1: Chair with arms  Technique 1: Sit to stand, Stand to sit  Transfer Device 1: Walker  Transfer Level of Assistance 1: Contact guard  Trials/Comments 1: pt. got up from chair performed ex. standing then returned to chair no loss of balance and improved safety.  Toilet Transfers  Toilet Transfer From: Chair  Toilet Transfer Type: To and from  Toilet Transfer to: Standard toilet  Toilet Transfer Technique: Ambulating (FWW)  Toilet Transfers: Contact guard  Toilet Transfers Comments: cueing for walker safety pt. tend to walk outside of walker.             Therapy/Activity: Therapeutic Exercise  Therapeutic Exercise Performed: Yes (all ex. completed in standing via medium resistive t-band.)  Therapeutic Exercise Activity 1: horizontal pull  1x15  Therapeutic Exercise Activity 2: diagonal pull 1x15  Therapeutic Exercise Activity 3: triceps elbow extenstion 1x15 reps.            Outcome Measures:[facility] Daily Activity  Putting on and taking off regular lower body clothing: A lot  Bathing (including washing, rinsing, drying): A lot  Putting on and taking off regular upper body clothing: A little  Toileting, which includes using toilet, bedpan or urinal: A lot  Taking care of personal grooming such as brushing teeth: A little  Eating Meals: None  Daily Activity - Total Score: 16  Education Documentation  Body Mechanics, taught by NADJA Wetzel at 1/22/2024  4:14 PM.  Learner: Patient  Readiness: Acceptance  Method: Explanation  Response: Verbalizes Understanding, Needs Reinforcement, Demonstrated Understanding    Precautions, taught by NADJA Wetzel at 1/22/2024  4:14 PM.  Learner: Patient  Readiness: Acceptance  Method:  Explanation  Response: Verbalizes Understanding, Needs Reinforcement, Demonstrated Understanding    ADL Training, taught by NADJA Wetzel at 1/22/2024  4:14 PM.  Learner: Patient  Readiness: Acceptance  Method: Explanation  Response: Verbalizes Understanding, Needs Reinforcement, Demonstrated Understanding    Education Comments  No comments found.        EDUCATION:       Goals:  Encounter Problems       Encounter Problems (Active)       ADLs       Patient will complete daily grooming tasks brushing teeth and washing face/hair with stand by assist level of assistance and PRN adaptive equipment while standing. (Not Progressing)       Start:  01/12/24    Expected End:  01/26/24            Patient will complete toileting including hygiene clothing management/hygiene with minimal assist  level of assistance and raised toilet seat and grab bars. (Progressing)       Start:  01/12/24    Expected End:  01/26/24               COGNITION/SAFETY       Patient will follow 90% Multistep commands to allow improved ADL performance. (Progressing)       Start:  01/12/24    Expected End:  01/26/24            Patient will demonstrated orientation x place, time, situation with minimal or less verbal cues. (Met)       Start:  01/12/24    Expected End:  01/26/24    Resolved:  01/22/24    ORIENTATION                    TRANSFERS       Patient will complete functional transfers with least restrictive device with contact guard assist level of assistance. (Progressing)       Start:  01/12/24    Expected End:  01/26/24

## 2024-01-22 NOTE — CARE PLAN
The patient's goals for the shift include      The clinical goals for the shift include Pt will remain safe and free of falls during shift    Over the shift, the patient did not make progress toward the following goals. Barriers to progression include   Problem: Fall/Injury  Goal: Not fall by end of shift  Outcome: Progressing  Goal: Be free from injury by end of the shift  Outcome: Progressing  Goal: Verbalize understanding of personal risk factors for fall in the hospital  Outcome: Progressing  Goal: Verbalize understanding of risk factor reduction measures to prevent injury from fall in the home  Outcome: Progressing  Goal: Use assistive devices by end of the shift  Outcome: Progressing  Goal: Pace activities to prevent fatigue by end of the shift  Outcome: Progressing     Problem: Skin  Goal: Participates in plan/prevention/treatment measures  Outcome: Progressing  Goal: Decreased wound size/increased tissue granulation at next dressing change  Outcome: Progressing  Goal: Prevent/manage excess moisture  Outcome: Progressing  Goal: Prevent/minimize sheer/friction injuries  Outcome: Progressing  Goal: Promote/optimize nutrition  Outcome: Progressing  Goal: Promote skin healing  Outcome: Progressing     Problem: Nutrition  Goal: Oral intake greater than 50%  Outcome: Progressing  Goal: Lab values WNL  Outcome: Progressing  Goal: Electrolytes WNL  Outcome: Progressing     Problem: Hyponatremia  Goal: improvement in sodium by discharge  Outcome: Progressing     Problem: Pain - Adult  Goal: Verbalizes/displays adequate comfort level or baseline comfort level  Outcome: Progressing     Problem: Safety - Adult  Goal: Free from fall injury  Outcome: Progressing     Problem: Discharge Planning  Goal: Discharge to home or other facility with appropriate resources  Outcome: Progressing     Problem: Chronic Conditions and Co-morbidities  Goal: Patient's chronic conditions and co-morbidity symptoms are monitored and maintained  or improved  Outcome: Progressing   . Recommendations to address these barriers include .

## 2024-01-23 VITALS
TEMPERATURE: 98.1 F | BODY MASS INDEX: 30.09 KG/M2 | HEART RATE: 73 BPM | RESPIRATION RATE: 18 BRPM | WEIGHT: 227 LBS | DIASTOLIC BLOOD PRESSURE: 72 MMHG | HEIGHT: 73 IN | OXYGEN SATURATION: 99 % | SYSTOLIC BLOOD PRESSURE: 117 MMHG

## 2024-01-23 PROCEDURE — C9113 INJ PANTOPRAZOLE SODIUM, VIA: HCPCS | Performed by: INTERNAL MEDICINE

## 2024-01-23 PROCEDURE — 99239 HOSP IP/OBS DSCHRG MGMT >30: CPT | Performed by: INTERNAL MEDICINE

## 2024-01-23 PROCEDURE — 97116 GAIT TRAINING THERAPY: CPT | Mod: GP,CQ | Performed by: PHYSICAL THERAPY ASSISTANT

## 2024-01-23 PROCEDURE — 2500000004 HC RX 250 GENERAL PHARMACY W/ HCPCS (ALT 636 FOR OP/ED): Performed by: INTERNAL MEDICINE

## 2024-01-23 PROCEDURE — 2500000001 HC RX 250 WO HCPCS SELF ADMINISTERED DRUGS (ALT 637 FOR MEDICARE OP): Performed by: INTERNAL MEDICINE

## 2024-01-23 RX ADMIN — METOPROLOL SUCCINATE 50 MG: 50 TABLET, EXTENDED RELEASE ORAL at 08:36

## 2024-01-23 RX ADMIN — THIAMINE HCL TAB 100 MG 100 MG: 100 TAB at 08:37

## 2024-01-23 RX ADMIN — PANTOPRAZOLE SODIUM 40 MG: 40 INJECTION, POWDER, FOR SOLUTION INTRAVENOUS at 08:36

## 2024-01-23 RX ADMIN — FOLIC ACID 1 MG: 1 TABLET ORAL at 08:36

## 2024-01-23 RX ADMIN — Medication 1 TABLET: at 08:37

## 2024-01-23 ASSESSMENT — COGNITIVE AND FUNCTIONAL STATUS - GENERAL
CLIMB 3 TO 5 STEPS WITH RAILING: TOTAL
MOVING TO AND FROM BED TO CHAIR: A LITTLE
PERSONAL GROOMING: A LITTLE
WALKING IN HOSPITAL ROOM: A LITTLE
STANDING UP FROM CHAIR USING ARMS: A LITTLE
STANDING UP FROM CHAIR USING ARMS: A LITTLE
DAILY ACTIVITIY SCORE: 17
TURNING FROM BACK TO SIDE WHILE IN FLAT BAD: A LITTLE
MOBILITY SCORE: 16
MOVING TO AND FROM BED TO CHAIR: A LITTLE
MOBILITY SCORE: 16
HELP NEEDED FOR BATHING: A LOT
DRESSING REGULAR UPPER BODY CLOTHING: A LITTLE
TOILETING: A LITTLE
MOVING FROM LYING ON BACK TO SITTING ON SIDE OF FLAT BED WITH BEDRAILS: A LITTLE
WALKING IN HOSPITAL ROOM: A LOT
CLIMB 3 TO 5 STEPS WITH RAILING: TOTAL
TURNING FROM BACK TO SIDE WHILE IN FLAT BAD: A LITTLE
DRESSING REGULAR LOWER BODY CLOTHING: A LOT

## 2024-01-23 ASSESSMENT — PAIN SCALES - GENERAL: PAINLEVEL_OUTOF10: 0 - NO PAIN

## 2024-01-23 NOTE — PROGRESS NOTES
Selvin Ochoa is a 53 y.o. male on day 13 of admission presenting with Hyponatremia.    Subjective   Selvin Ochoa is a 53 y.o. male with a PMH of HTN, HLD and EtOH use disorder presenting with weakness.      He reports progressive weakness for the slat three days. The patient reports that he has not been able to keep any solid food down the past 3 days as well.  He has been able to maintain fluids.  The patient drinks 8-10 tall   Insert SmartText    boys per day.  He states that he has cut down.  The patient has had seizure activity when he has tried to quit drinking.  The patient denies any fevers, chills, night cough, or diarrhea.  He reports normal urination.  He noted yesterday that his eyes and chest were turning yellow.  The patient denies any diagnosis of cirrhosis or liver dysfunction.  The patient reports that his last drink was last night but then changed and stated that his last drink was this morning.  He denies any contacts.  He is not having any chest pain, shortness of breath, dizziness, palpitations, paresthesias, focal weakness.  Denies any abdominal pain.       1/10: Patient is now in barb EtOH withdrawal. He required phenobarbital this AM.   1/11: Patient was seen and examined.  Continues to be in alcohol withdrawal.  Sleeping quietly when I saw after getting phenobarb this morning.  Requiring 2 L nasal cannula oxygen to maintain saturation.  Hyponatremia improving with sodium of 119 this morning.  Continue IV LR for now.  Nephrologist on board.  1/12:Patient was seen and examined. Still drowsy but arousable and confused. Saturating well on room air today. Hyponatremia continues to improve 125 today.    Bilirubin slightly elevated today with AST ALT trending down.  Discussed with patient's brother at bedside.  GI and nephrologist recommendations appreciated.  Continue to trend CMP daily.  1/13: Patient was seen and examined.  More awake and interactive today.  Sodium is improved to 132 today.   Bilirubin still elevated but AST ALT trending down.  Trend CMP daily.  Potential discharge extended-care facility within 48 to 72 hours.    1/14: Patient was seen and examined.  Sodium is now 128 today which could be around his baseline in the setting of advancing liver disease.  AST ALT and bilirubin trending down.  1/15: Patient was seen and examined.  Sodium is now 134.  AST ALT AST ALT and bilirubin continue to trend down.  GI signed off.  Awaiting placement in extended-care facility.    1/16: Patient was seen and examined.  He continues to be awake and alert and interactive.  Seen by dietitian who is going to start the patient on protein supplementation today.  Thank you currently awaiting authorization for placement in an extended care facility.  1/17: Patient was seen and examined.  Awake and interactive sitting on a chair at the bedside today when I saw.  Patient has been set up for [facility] pending for potential discharge to extended-care facility once authorization can be obtained.  1/18:.  Patient seen and examined.  Was eating dinner when I saw.  Has no new complaints today.  Currently awaiting placement in extended-care facility.  1/19: Patient was seen and examined.. Has no new complaints today.  Currently awaiting accepting extended-care facility.  1/20: Patient seen and examined feeling better does have some mild tremulousness hoping to go to the [facility] in [place] on Monday.  1/21: Patient sitting up in chair looks to be feeling quite a bit better awaiting precertification for [facility] at [place].  1/22: Patient awaiting his Medicaid number for preauthorization paperwork in process.  1/23: Patient reports being approved by medicaid per patient. Waiting for approval to be discharged.     See [facility]rod and after visit summary for complete plan      35 minutes spent in the care of this patient.  Objective     EXAM:    Constitutional: Mildly tremulous    Head and facial: Bearded red hair    Neck: Neck  "supple    Lungs: Lungs clear to auscultation    Heart: Regular heart rate and azul    Abdomen: No abdominal tenderness    Pelvis/:No flank tenderness    Extremities: Trace pretibial edema    Neurologic: Mild tremulousness    Dermatologic: Pale complexion    Psychiatric/behavioral: Patient is pleasant appropriate and conversant.        Last Recorded Vitals  Blood pressure 106/69, pulse 66, temperature 36 °C (96.8 °F), temperature source Temporal, resp. rate 18, height 1.854 m (6' 1\"), weight 103 kg (227 lb), SpO2 98 %.  Intake/Output last 3 Shifts:  I/O last 3 completed shifts:  In: 1259 (12.2 mL/kg) [P.O.:1259]  Out: - (0 mL/kg)   Weight: 103 kg     Relevant Results              Scheduled medications  folic acid, 1 mg, oral, Daily  metoprolol succinate XL, 50 mg, oral, Daily  multivitamin with minerals, 1 tablet, oral, Daily  pantoprazole, 40 mg, intravenous, Daily  thiamine, 100 mg, oral, Daily      Continuous medications     PRN medications  PRN medications: dextrose 10 % in water (D10W), dextrose, glucagon, LORazepam **OR** LORazepam **OR** LORazepam, ondansetron, oxygen    This patient has a urinary catheter   Reason for the urinary catheter remaining today? critically ill patient who need accurate urinary output measurements               Assessment/Plan   Hypovolemic Hyponatremia (likely beer potomania as well) resolved  Alcohol Use Disorder with Risk for withdrawal   Alcoholic Hepatitis   Gastritis   Resolved possible Ileus   YOLANDA resolved  HTN  DVT ppx   -SCDs    See after visit summary and [facility]rod for complete plan    Hang SCHMIDT  Shared visit with student  Patient seen and examined  Note amended and addended  See my orders for complete plan    "

## 2024-01-23 NOTE — PROGRESS NOTES
Physical Therapy    Physical Therapy Treatment    Patient Name: Selvin Ochoa  MRN: [id number]  Today's Date: 1/23/2024  Time Calculation  Start Time: 1410  Stop Time: 1426  Time Calculation (min): 16 min       Assessment/Plan   PT Assessment  End of Session Communication: Bedside nurse  Assessment Comment: pt continues to demonstrate improved safety and mobility. He will benefit from further skilled PT services.  End of Session Patient Position: Up in chair, Alarm on     PT Plan  Treatment/Interventions: Bed mobility, Transfer training, Gait training, Endurance training, Strengthening  PT Plan: Skilled PT  PT Frequency: 4 times per week  PT Discharge Recommendations: Moderate intensity level of continued care  Equipment Recommended upon Discharge:  (TBD)  PT Recommended Transfer Status: Assist x2  PT - OK to Discharge: Yes (WHEN MEDICALLY CLEARED)      General Visit Information:   PT  Visit  PT Received On: 01/23/24  Response to Previous Treatment: Patient with no complaints from previous session.  General  Reason for Referral: IMPAIRED MOBILITY GAIT TRAINING  Referred By: ASHVIN  Past Medical History Relevant to Rehab: WEAK; DX: HYPONATREMIA, ETOH WD AND HEPATITIS, R/O ILEUS, YOLANDA, ENCEPHALOPATHY; HX: HTN ETOH  Patient Position Received: Up in chair, Alarm on  Preferred Learning Style: verbal  General Comment: pt agreeable to treatment and cleared by RN. pt hoping to dc to SNF today      Precautions:  Precautions  Medical Precautions: Fall precautions, Seizure precautions            Cognition:  Cognition  Overall Cognitive Status: Within Functional Limits       Treatments:  Ambulation/Gait Training  Ambulation/Gait Training Performed: Yes  Ambulation/Gait Training 1  Surface 1: Level tile  Device 1: Rolling walker  Assistance 1: Minimum assistance, Minimal verbal cues (pt with occasional MOD A for around objects to assist with walker management and improve safety)  Quality of Gait 1: Decreased step length,  Inconsistent stride length  Comments/Distance (ft) 1: 55'5  Transfers  Transfer: Yes  Transfer 1  Technique 1: Stand to sit, Sit to stand  Transfer Device 1: Walker  Transfer Level of Assistance 1: Minimum assistance, Contact guard  Trials/Comments 1: cues for safety and technique. pt is impulsive.    Outcome Measures:  [facility] Basic Mobility  Turning from your back to your side while in a flat bed without using bedrails: None  Moving from lying on your back to sitting on the side of a flat bed without using bedrails: A little  Moving to and from bed to chair (including a wheelchair): A little  Standing up from a chair using your arms (e.g. wheelchair or bedside chair): A little  To walk in hospital room: A lot  Climbing 3-5 steps with railing: Total  Basic Mobility - Total Score: 16    Education Documentation  Mobility Training, taught by Elo Kaba PTA at 1/23/2024  2:42 PM.  Learner: Patient  Readiness: Acceptance  Method: Explanation  Response: Verbalizes Understanding, Needs Reinforcement    Education Comments  No comments found.             Encounter Problems       Encounter Problems (Active)       Mobility       STG - Patient will ambulate (Progressing)       Start:  01/12/24    Expected End:  02/02/24       FWW MIN X2 75 FT         STRENGTHENING (Progressing)       Start:  01/12/24    Expected End:  02/02/24       20+ REPS EX INCREASING STRENGTH TO PROGRESS TO OOB ACTIVITIES            Pain - Adult          Transfers       STG - Transfer from bed to chair (Progressing)       Start:  01/12/24    Expected End:  01/26/24       FWW MIN X1-2A         STG - Patient to transfer to and from sit to supine (Progressing)       Start:  01/12/24    Expected End:  02/02/24       MIN A X1 USING RAILS HOB FLAT         STG - Patient will transfer sit to and from stand (Progressing)       Start:  01/12/24    Expected End:  02/02/24       FWW MIN 1-2 A USING PROPER TECHNIQUE

## 2024-01-23 NOTE — CARE PLAN
Problem: Mobility  Goal: STG - Patient will ambulate  Description: FWW MIN X2 75 FT  Outcome: Progressing  Goal: STRENGTHENING  Description: 20+ REPS EX INCREASING STRENGTH TO PROGRESS TO OOB ACTIVITIES  Outcome: Progressing     Problem: Transfers  Goal: STG - Transfer from bed to chair  Description: FWW MIN X1-2A  Outcome: Progressing  Goal: STG - Patient to transfer to and from sit to supine  Description: MIN A X1 USING RAILS HOB FLAT  Outcome: Progressing  Note: Goal updated 1/23  Goal: STG - Patient will transfer sit to and from stand  Description: FWW MIN 1-2 A USING PROPER TECHNIQUE  Outcome: Progressing  Note: Goal updated 1/23

## 2024-01-23 NOTE — DISCHARGE SUMMARY
Discharge Diagnosis  Hyponatremia    Hypovolemic Hyponatremia (likely beer potomania as well) resolved  Alcohol Use Disorder with Risk for withdrawal   Alcoholic Hepatitis   Gastritis   Resolved possible Ileus   YOLANDA resolved  HTN...      Issues Requiring Follow-Up  See after visit summary and goldenrod for complete plan.    Discharge Meds     Your medication list        CONTINUE taking these medications        Instructions Last Dose Given Next Dose Due   amLODIPine 5 mg tablet  Commonly known as: Norvasc      TAKE ONE TABLET BY MOUTH ONCE DAILY       atorvastatin 10 mg tablet  Commonly known as: Lipitor      Take 1 tablet (10 mg) by mouth once daily.       lisinopril 40 mg tablet      TAKE ONE TABLET BY MOUTH EVERY DAY       metoprolol succinate XL 50 mg 24 hr tablet  Commonly known as: Toprol-XL      TAKE ONE TABLET BY MOUTH ONCE DAILY. DO NOT CRUSH OR CHEW.                Test Results Pending At Discharge  Pending Labs       No current pending labs.            Hospital Course   manisha Ochoa is a 53 y.o. male on day 13 of admission presenting with Hyponatremia.        Subjective   Selvin Ochoa is a 53 y.o. male with a PMH of HTN, HLD and EtOH use disorder presenting with weakness.      He reports progressive weakness for the slat three days. The patient reports that he has not been able to keep any solid food down the past 3 days as well.  He has been able to maintain fluids.  The patient drinks 8-10 tall   Insert SmartText     boys per day.  He states that he has cut down.  The patient has had seizure activity when he has tried to quit drinking.  The patient denies any fevers, chills, night cough, or diarrhea.  He reports normal urination.  He noted yesterday that his eyes and chest were turning yellow.  The patient denies any diagnosis of cirrhosis or liver dysfunction.  The patient reports that his last drink was last night but then changed and stated that his last drink was this morning.  He denies any  contacts.  He is not having any chest pain, shortness of breath, dizziness, palpitations, paresthesias, focal weakness.  Denies any abdominal pain.         1/10: Patient is now in barb EtOH withdrawal. He required phenobarbital this AM.   1/11: Patient was seen and examined.  Continues to be in alcohol withdrawal.  Sleeping quietly when I saw after getting phenobarb this morning.  Requiring 2 L nasal cannula oxygen to maintain saturation.  Hyponatremia improving with sodium of 119 this morning.  Continue IV LR for now.  Nephrologist on board.  1/12:Patient was seen and examined. Still drowsy but arousable and confused. Saturating well on room air today. Hyponatremia continues to improve 125 today.      Bilirubin slightly elevated today with AST ALT trending down.  Discussed with patient's brother at bedside.  GI and nephrologist recommendations appreciated.  Continue to trend CMP daily.  1/13: Patient was seen and examined.  More awake and interactive today.  Sodium is improved to 132 today.  Bilirubin still elevated but AST ALT trending down.  Trend CMP daily.  Potential discharge extended-care facility within 48 to 72 hours.    1/14: Patient was seen and examined.  Sodium is now 128 today which could be around his baseline in the setting of advancing liver disease.  AST ALT and bilirubin trending down.  1/15: Patient was seen and examined.  Sodium is now 134.  AST ALT AST ALT and bilirubin continue to trend down.  GI signed off.  Awaiting placement in extended-care facility.    1/16: Patient was seen and examined.  He continues to be awake and alert and interactive.  Seen by dietitian who is going to start the patient on protein supplementation today.  Thank you currently awaiting authorization for placement in an extended care facility.  1/17: Patient was seen and examined.  Awake and interactive sitting on a chair at the bedside today when I saw.  Patient has been set up for [facility] pending for potential  discharge to extended-care facility once authorization can be obtained.  1/18:.  Patient seen and examined.  Was eating dinner when I saw.  Has no new complaints today.  Currently awaiting placement in extended-care facility.  1/19: Patient was seen and examined.. Has no new complaints today.  Currently awaiting accepting extended-care facility.  1/20: Patient seen and examined feeling better does have some mild tremulousness hoping to go to the [facility] in [place] on Monday.  1/21: Patient sitting up in chair looks to be feeling quite a bit better awaiting precertification for [facility] at [place].  1/22: Patient awaiting his Medicaid number for preauthorization paperwork in process.  1/23: Patient reports being approved by medicaid per patient. Waiting for approval to be discharged.      See goldenrod and after visit summary for complete plan        35 minutes spent in the care of this patient.    Pertinent Physical Exam At Time of Discharge  Physical Exam  See today's progress note for more physical exam findings  Outpatient Follow-Up  Future Appointments   Date Time Provider Department Center   2/14/2024 10:20 AM Pankaj Rios MD [id number]       .  Qamar Singh MD

## 2024-01-24 NOTE — NURSING NOTE
Patient discharged to [facility] in [place] for rehab. Report called to receiving nurse. [facility] given to [facility]. IV removed prior to DC.

## 2024-02-02 ENCOUNTER — TELEPHONE (OUTPATIENT)
Dept: PRIMARY CARE | Facility: CLINIC | Age: 54
End: 2024-02-02
Payer: MEDICAID

## 2024-02-02 DIAGNOSIS — F10.10 ALCOHOL ABUSE, CONTINUOUS: Primary | ICD-10-CM

## 2024-02-02 NOTE — TELEPHONE ENCOUNTER
Pt came into office and is requesting a refill of Folic Acid 1 mg. Pt stated he was given this while in rehab. Please advise.     Pt's pharmacy is [facility] [place]

## 2024-02-05 ENCOUNTER — LAB (OUTPATIENT)
Dept: LAB | Facility: LAB | Age: 54
End: 2024-02-05
Payer: MEDICAID

## 2024-02-05 DIAGNOSIS — R73.03 PREDIABETES: ICD-10-CM

## 2024-02-05 DIAGNOSIS — E78.2 MIXED HYPERLIPIDEMIA: ICD-10-CM

## 2024-02-05 DIAGNOSIS — I10 BENIGN HYPERTENSION: ICD-10-CM

## 2024-02-05 LAB
ALBUMIN SERPL BCP-MCNC: 2.7 G/DL (ref 3.4–5)
ALP SERPL-CCNC: 73 U/L (ref 33–120)
ALT SERPL W P-5'-P-CCNC: 57 U/L (ref 10–52)
ANION GAP SERPL CALC-SCNC: 12 MMOL/L (ref 10–20)
AST SERPL W P-5'-P-CCNC: 50 U/L (ref 9–39)
BILIRUB SERPL-MCNC: 1.4 MG/DL (ref 0–1.2)
BUN SERPL-MCNC: 7 MG/DL (ref 6–23)
CALCIUM SERPL-MCNC: 8.2 MG/DL (ref 8.6–10.3)
CHLORIDE SERPL-SCNC: 108 MMOL/L (ref 98–107)
CHOLEST SERPL-MCNC: 112 MG/DL (ref 0–199)
CHOLESTEROL/HDL RATIO: 3
CO2 SERPL-SCNC: 24 MMOL/L (ref 21–32)
CREAT SERPL-MCNC: 0.97 MG/DL (ref 0.5–1.3)
EGFRCR SERPLBLD CKD-EPI 2021: >90 ML/MIN/1.73M*2
ERYTHROCYTE [DISTWIDTH] IN BLOOD BY AUTOMATED COUNT: 13.3 % (ref 11.5–14.5)
EST. AVERAGE GLUCOSE BLD GHB EST-MCNC: 80 MG/DL
GLUCOSE SERPL-MCNC: 95 MG/DL (ref 74–99)
HBA1C MFR BLD: 4.4 %
HCT VFR BLD AUTO: 41.5 % (ref 41–52)
HDLC SERPL-MCNC: 37.7 MG/DL
HGB BLD-MCNC: 12.7 G/DL (ref 13.5–17.5)
LDLC SERPL CALC-MCNC: 57 MG/DL
MCH RBC QN AUTO: 32.7 PG (ref 26–34)
MCHC RBC AUTO-ENTMCNC: 30.6 G/DL (ref 32–36)
MCV RBC AUTO: 107 FL (ref 80–100)
NON HDL CHOLESTEROL: 74 MG/DL (ref 0–149)
NRBC BLD-RTO: 0 /100 WBCS (ref 0–0)
PLATELET # BLD AUTO: 435 X10*3/UL (ref 150–450)
POTASSIUM SERPL-SCNC: 4.5 MMOL/L (ref 3.5–5.3)
PROT SERPL-MCNC: 5.5 G/DL (ref 6.4–8.2)
RBC # BLD AUTO: 3.88 X10*6/UL (ref 4.5–5.9)
SODIUM SERPL-SCNC: 139 MMOL/L (ref 136–145)
TRIGL SERPL-MCNC: 87 MG/DL (ref 0–149)
VLDL: 17 MG/DL (ref 0–40)
WBC # BLD AUTO: 5.9 X10*3/UL (ref 4.4–11.3)

## 2024-02-05 PROCEDURE — 80061 LIPID PANEL: CPT

## 2024-02-05 PROCEDURE — 80053 COMPREHEN METABOLIC PANEL: CPT

## 2024-02-05 PROCEDURE — 36415 COLL VENOUS BLD VENIPUNCTURE: CPT

## 2024-02-05 PROCEDURE — 85027 COMPLETE CBC AUTOMATED: CPT

## 2024-02-05 PROCEDURE — 83036 HEMOGLOBIN GLYCOSYLATED A1C: CPT

## 2024-02-05 RX ORDER — FOLIC ACID 1 MG/1
1 TABLET ORAL DAILY
Qty: 30 TABLET | Refills: 3 | Status: SHIPPED | OUTPATIENT
Start: 2024-02-05 | End: 2024-04-18 | Stop reason: SDUPTHER

## 2024-02-14 ENCOUNTER — OFFICE VISIT (OUTPATIENT)
Dept: PRIMARY CARE | Facility: CLINIC | Age: 54
End: 2024-02-14
Payer: MEDICAID

## 2024-02-14 VITALS
HEART RATE: 69 BPM | BODY MASS INDEX: 30.88 KG/M2 | HEIGHT: 73 IN | DIASTOLIC BLOOD PRESSURE: 76 MMHG | SYSTOLIC BLOOD PRESSURE: 122 MMHG | TEMPERATURE: 96.9 F | RESPIRATION RATE: 16 BRPM | WEIGHT: 233 LBS | OXYGEN SATURATION: 99 %

## 2024-02-14 DIAGNOSIS — Z09 HOSPITAL DISCHARGE FOLLOW-UP: Primary | ICD-10-CM

## 2024-02-14 DIAGNOSIS — Z86.19 HISTORY OF HEPATITIS: ICD-10-CM

## 2024-02-14 DIAGNOSIS — E78.2 MIXED HYPERLIPIDEMIA: ICD-10-CM

## 2024-02-14 DIAGNOSIS — R20.0 NUMBNESS AND TINGLING OF BOTH FEET: ICD-10-CM

## 2024-02-14 DIAGNOSIS — I10 BENIGN HYPERTENSION: ICD-10-CM

## 2024-02-14 DIAGNOSIS — E87.1 HYPONATREMIA: ICD-10-CM

## 2024-02-14 DIAGNOSIS — D64.9 ANEMIA, UNSPECIFIED TYPE: ICD-10-CM

## 2024-02-14 DIAGNOSIS — Z87.828 HISTORY OF KIDNEY INJURY: ICD-10-CM

## 2024-02-14 DIAGNOSIS — F41.8 ANXIETY WITH LIMITED-SYMPTOM ATTACKS: ICD-10-CM

## 2024-02-14 DIAGNOSIS — R20.2 NUMBNESS AND TINGLING OF BOTH FEET: ICD-10-CM

## 2024-02-14 DIAGNOSIS — F10.10 ALCOHOL ABUSE, CONTINUOUS: ICD-10-CM

## 2024-02-14 PROCEDURE — 3008F BODY MASS INDEX DOCD: CPT | Performed by: STUDENT IN AN ORGANIZED HEALTH CARE EDUCATION/TRAINING PROGRAM

## 2024-02-14 PROCEDURE — 3078F DIAST BP <80 MM HG: CPT | Performed by: STUDENT IN AN ORGANIZED HEALTH CARE EDUCATION/TRAINING PROGRAM

## 2024-02-14 PROCEDURE — 99215 OFFICE O/P EST HI 40 MIN: CPT | Performed by: STUDENT IN AN ORGANIZED HEALTH CARE EDUCATION/TRAINING PROGRAM

## 2024-02-14 PROCEDURE — 3074F SYST BP LT 130 MM HG: CPT | Performed by: STUDENT IN AN ORGANIZED HEALTH CARE EDUCATION/TRAINING PROGRAM

## 2024-02-14 RX ORDER — ATORVASTATIN CALCIUM 10 MG/1
10 TABLET, FILM COATED ORAL DAILY
Qty: 90 TABLET | Refills: 1 | Status: SHIPPED | OUTPATIENT
Start: 2024-02-14 | End: 2024-04-18 | Stop reason: SDUPTHER

## 2024-02-14 RX ORDER — HYDROXYZINE PAMOATE 25 MG/1
25 CAPSULE ORAL AS NEEDED
Qty: 10 CAPSULE | Refills: 0 | Status: SHIPPED | OUTPATIENT
Start: 2024-02-14 | End: 2024-02-16 | Stop reason: SDUPTHER

## 2024-02-14 RX ORDER — LISINOPRIL 40 MG/1
40 TABLET ORAL DAILY
Qty: 30 TABLET | Refills: 3 | Status: SHIPPED | OUTPATIENT
Start: 2024-02-14 | End: 2024-04-18 | Stop reason: SDUPTHER

## 2024-02-14 RX ORDER — METOPROLOL SUCCINATE 50 MG/1
TABLET, EXTENDED RELEASE ORAL
Qty: 30 TABLET | Refills: 3 | Status: SHIPPED | OUTPATIENT
Start: 2024-02-14 | End: 2024-04-18 | Stop reason: SDUPTHER

## 2024-02-14 RX ORDER — AMLODIPINE BESYLATE 5 MG/1
5 TABLET ORAL DAILY
Qty: 30 TABLET | Refills: 3 | Status: SHIPPED | OUTPATIENT
Start: 2024-02-14 | End: 2024-04-18 | Stop reason: SDUPTHER

## 2024-02-14 SDOH — ECONOMIC STABILITY: FOOD INSECURITY: WITHIN THE PAST 12 MONTHS, THE FOOD YOU BOUGHT JUST DIDN'T LAST AND YOU DIDN'T HAVE MONEY TO GET MORE.: NEVER TRUE

## 2024-02-14 SDOH — ECONOMIC STABILITY: FOOD INSECURITY: WITHIN THE PAST 12 MONTHS, YOU WORRIED THAT YOUR FOOD WOULD RUN OUT BEFORE YOU GOT MONEY TO BUY MORE.: NEVER TRUE

## 2024-02-14 ASSESSMENT — LIFESTYLE VARIABLES
AUDIT-C TOTAL SCORE: 0
HOW OFTEN DO YOU HAVE SIX OR MORE DRINKS ON ONE OCCASION: NEVER
SKIP TO QUESTIONS 9-10: 1
HOW MANY STANDARD DRINKS CONTAINING ALCOHOL DO YOU HAVE ON A TYPICAL DAY: PATIENT DOES NOT DRINK
HOW OFTEN DO YOU HAVE A DRINK CONTAINING ALCOHOL: NEVER

## 2024-02-14 ASSESSMENT — ENCOUNTER SYMPTOMS
PALPITATIONS: 0
VOMITING: 0
CONSTIPATION: 0
MUSCULOSKELETAL NEGATIVE: 1
WHEEZING: 0
HEADACHES: 0
COUGH: 0
ABDOMINAL PAIN: 0
CHILLS: 0
COLOR CHANGE: 0
SHORTNESS OF BREATH: 0
NAUSEA: 0
CONFUSION: 0
DIARRHEA: 0
FATIGUE: 0
UNEXPECTED WEIGHT CHANGE: 0
DIZZINESS: 0
FEVER: 0

## 2024-02-14 ASSESSMENT — PATIENT HEALTH QUESTIONNAIRE - PHQ9
SUM OF ALL RESPONSES TO PHQ9 QUESTIONS 1 & 2: 0
2. FEELING DOWN, DEPRESSED OR HOPELESS: NOT AT ALL
1. LITTLE INTEREST OR PLEASURE IN DOING THINGS: NOT AT ALL

## 2024-02-14 NOTE — PROGRESS NOTES
"Subjective   Patient ID: Selvin Ochoa is a 53 y.o. male who presents for Toe Pain (Patient states he has numbness in his toes on both feet.).    HPI   He is here for hosp discharge FU and also c/o toe pain. He was in the  [place] hosp from 1/9/24-1/23/24, was sent to SNF and was discharged home on 02/02/24. He was hosp for hyponatremia, alcohol use disorder, acute hepatitis, gastritis, YOLANDA and uncontrolled HTN. Recent CMP (02/5/24) showing improvement in renal function w/ Scr of 0.97; LFT also improved with AST 50, tbili 1.4 and ALT 57.   Reports he is doing good, no more drinking alcohol, last drink was 01/09/24. Denies craving or plan to start drinking again. His IO /76; taking all of his meds as rx'd. Reports mild numbness of bl toes/feet; comes and goes; not daily. H/o heavy alcohol use for long time, stopped few wks ago.     Review of Systems   Constitutional:  Negative for chills, fatigue, fever and unexpected weight change.   HENT: Negative.     Respiratory:  Negative for cough, shortness of breath and wheezing.    Cardiovascular:  Negative for chest pain, palpitations and leg swelling.   Gastrointestinal:  Negative for abdominal pain, constipation, diarrhea, nausea and vomiting.   Musculoskeletal: Negative.    Skin:  Negative for color change and rash.   Neurological:  Negative for dizziness and headaches.   Psychiatric/Behavioral:  Negative for behavioral problems and confusion.        Objective   /76 (BP Location: Right arm, Patient Position: Sitting, BP Cuff Size: Adult)   Pulse 69   Temp 36.1 °C (96.9 °F) (Temporal)   Resp 16   Ht 1.854 m (6' 1\")   Wt 106 kg (233 lb)   SpO2 99%   BMI 30.74 kg/m²     Physical Exam  Vitals and nursing note reviewed.   Constitutional:       Appearance: Normal appearance. He is obese.      Comments: Anxious appearing male with sl flushed facial skin.    Cardiovascular:      Rate and Rhythm: Normal rate and regular rhythm.      Pulses: Normal pulses.     "  Heart sounds: Normal heart sounds.   Pulmonary:      Effort: Pulmonary effort is normal. No respiratory distress.      Breath sounds: Normal breath sounds.   Abdominal:      General: Abdomen is flat. Bowel sounds are normal.      Palpations: Abdomen is soft.   Musculoskeletal:         General: Normal range of motion.   Neurological:      General: No focal deficit present.      Mental Status: He is alert and oriented to person, place, and time.   Psychiatric:         Mood and Affect: Mood normal.         Behavior: Behavior normal.       Assessment/Plan   He is here for hosp discharge FU and also c/o toe pain. He was in the  [place] hosp from 1/9/24-1/23/24, was sent to SNF and was discharged home on 02/02/24. He was hosp for hyponatremia, alcohol use disorder, acute hepatitis, gastritis, YOLANDA and uncontrolled HTN. Recent CMP (02/5/24) showing improvement in renal function w/ Scr of 0.97; LFT also improved with AST 50, tbili 1.4 and ALT 57.   Appears that he is doing much better, back to normal state of health, renal function normalize and LFT also almost back to normal. Will repeat CMP in the next few wks. Highly enc to avoid drinking alcohol completely.   He is having neuropathic pain, likely 2/2 alcohol abuse; obtain B12 & other BW as below. Advise taking multivitamin daily with addition of B12, thiamine and folic acid.   HTN remains controlled; cont the current regimen as usual. Rx refilled. Follow DASH diet.   Problem List Items Addressed This Visit             ICD-10-CM    Alcohol abuse, continuous F10.10    Relevant Orders    Vitamin B12    Folate    Benign hypertension I10    Relevant Medications    amLODIPine (Norvasc) 5 mg tablet    lisinopril 40 mg tablet    metoprolol succinate XL (Toprol-XL) 50 mg 24 hr tablet    Mixed hyperlipidemia E78.2    Relevant Medications    atorvastatin (Lipitor) 10 mg tablet    Hyponatremia E87.1    Relevant Orders    Comprehensive metabolic panel     Other Visit Diagnoses          Codes    Hospital discharge follow-up    -  Primary Z09    History of hepatitis     Z86.19    Relevant Orders    Comprehensive metabolic panel    History of kidney injury     Z87.828    Relevant Orders    Comprehensive metabolic panel    Anemia, unspecified type     D64.9    Relevant Orders    CBC and Auto Differential    Numbness and tingling of both feet     R20.0, R20.2    Relevant Orders    Vitamin B12    Folate    Anxiety with limited-symptom attacks     F41.8          Rtc 3 mo for FU    Pankaj Rios MD   [facility], [facility]

## 2024-02-15 ENCOUNTER — TELEPHONE (OUTPATIENT)
Dept: PRIMARY CARE | Facility: CLINIC | Age: 54
End: 2024-02-15
Payer: MEDICAID

## 2024-02-15 NOTE — TELEPHONE ENCOUNTER
[facility] called to get clarification on hydroxyzine Rx. It says quantity 10 and take as needed. Needs to specify frequency and how long the supply should last. Please end in an updated Rx.

## 2024-02-16 ENCOUNTER — TELEPHONE (OUTPATIENT)
Dept: PRIMARY CARE | Facility: CLINIC | Age: 54
End: 2024-02-16
Payer: MEDICAID

## 2024-02-16 DIAGNOSIS — F41.8 ANXIETY WITH LIMITED-SYMPTOM ATTACKS: ICD-10-CM

## 2024-02-16 RX ORDER — HYDROXYZINE PAMOATE 25 MG/1
25 CAPSULE ORAL 3 TIMES DAILY PRN
Qty: 10 CAPSULE | Refills: 0 | Status: SHIPPED | OUTPATIENT
Start: 2024-02-16 | End: 2024-04-18 | Stop reason: ALTCHOICE

## 2024-02-16 NOTE — TELEPHONE ENCOUNTER
Patient called in stating that he is flying out this Sunday the 18th and Dr. Rios prescribed him hydrOXYzine pamoate (VistariL) 25 mg capsule  but the pharmacy needs better directions.

## 2024-04-10 ENCOUNTER — LAB (OUTPATIENT)
Dept: LAB | Facility: LAB | Age: 54
End: 2024-04-10
Payer: MEDICAID

## 2024-04-10 DIAGNOSIS — F10.10 ALCOHOL ABUSE, CONTINUOUS: ICD-10-CM

## 2024-04-10 DIAGNOSIS — E87.1 HYPONATREMIA: ICD-10-CM

## 2024-04-10 DIAGNOSIS — R20.0 NUMBNESS AND TINGLING OF BOTH FEET: ICD-10-CM

## 2024-04-10 DIAGNOSIS — R20.2 NUMBNESS AND TINGLING OF BOTH FEET: ICD-10-CM

## 2024-04-10 DIAGNOSIS — Z86.19 HISTORY OF HEPATITIS: ICD-10-CM

## 2024-04-10 DIAGNOSIS — D64.9 ANEMIA, UNSPECIFIED TYPE: ICD-10-CM

## 2024-04-10 DIAGNOSIS — Z87.828 HISTORY OF KIDNEY INJURY: ICD-10-CM

## 2024-04-10 LAB
ALBUMIN SERPL BCP-MCNC: 3.8 G/DL (ref 3.4–5)
ALP SERPL-CCNC: 61 U/L (ref 33–120)
ALT SERPL W P-5'-P-CCNC: 20 U/L (ref 10–52)
ANION GAP SERPL CALC-SCNC: 10 MMOL/L (ref 10–20)
AST SERPL W P-5'-P-CCNC: 20 U/L (ref 9–39)
BASOPHILS # BLD AUTO: 0.13 X10*3/UL (ref 0–0.1)
BASOPHILS NFR BLD AUTO: 1.6 %
BILIRUB SERPL-MCNC: 0.5 MG/DL (ref 0–1.2)
BUN SERPL-MCNC: 12 MG/DL (ref 6–23)
CALCIUM SERPL-MCNC: 9 MG/DL (ref 8.6–10.3)
CHLORIDE SERPL-SCNC: 103 MMOL/L (ref 98–107)
CO2 SERPL-SCNC: 28 MMOL/L (ref 21–32)
CREAT SERPL-MCNC: 0.96 MG/DL (ref 0.5–1.3)
EGFRCR SERPLBLD CKD-EPI 2021: >90 ML/MIN/1.73M*2
EOSINOPHIL # BLD AUTO: 0.27 X10*3/UL (ref 0–0.7)
EOSINOPHIL NFR BLD AUTO: 3.3 %
ERYTHROCYTE [DISTWIDTH] IN BLOOD BY AUTOMATED COUNT: 12.1 % (ref 11.5–14.5)
FOLATE SERPL-MCNC: >22.3 NG/ML
GLUCOSE SERPL-MCNC: 94 MG/DL (ref 74–99)
HCT VFR BLD AUTO: 45 % (ref 41–52)
HGB BLD-MCNC: 14.6 G/DL (ref 13.5–17.5)
IMM GRANULOCYTES # BLD AUTO: 0.03 X10*3/UL (ref 0–0.7)
IMM GRANULOCYTES NFR BLD AUTO: 0.4 % (ref 0–0.9)
LYMPHOCYTES # BLD AUTO: 2.49 X10*3/UL (ref 1.2–4.8)
LYMPHOCYTES NFR BLD AUTO: 30.7 %
MCH RBC QN AUTO: 29.9 PG (ref 26–34)
MCHC RBC AUTO-ENTMCNC: 32.4 G/DL (ref 32–36)
MCV RBC AUTO: 92 FL (ref 80–100)
MONOCYTES # BLD AUTO: 0.63 X10*3/UL (ref 0.1–1)
MONOCYTES NFR BLD AUTO: 7.8 %
NEUTROPHILS # BLD AUTO: 4.55 X10*3/UL (ref 1.2–7.7)
NEUTROPHILS NFR BLD AUTO: 56.2 %
NRBC BLD-RTO: 0 /100 WBCS (ref 0–0)
PLATELET # BLD AUTO: 373 X10*3/UL (ref 150–450)
POTASSIUM SERPL-SCNC: 4.4 MMOL/L (ref 3.5–5.3)
PROT SERPL-MCNC: 6.7 G/DL (ref 6.4–8.2)
RBC # BLD AUTO: 4.89 X10*6/UL (ref 4.5–5.9)
SODIUM SERPL-SCNC: 137 MMOL/L (ref 136–145)
VIT B12 SERPL-MCNC: 496 PG/ML (ref 211–911)
WBC # BLD AUTO: 8.1 X10*3/UL (ref 4.4–11.3)

## 2024-04-10 PROCEDURE — 82746 ASSAY OF FOLIC ACID SERUM: CPT

## 2024-04-10 PROCEDURE — 85025 COMPLETE CBC W/AUTO DIFF WBC: CPT

## 2024-04-10 PROCEDURE — 36415 COLL VENOUS BLD VENIPUNCTURE: CPT

## 2024-04-10 PROCEDURE — 82607 VITAMIN B-12: CPT

## 2024-04-10 PROCEDURE — 80053 COMPREHEN METABOLIC PANEL: CPT

## 2024-04-18 ENCOUNTER — OFFICE VISIT (OUTPATIENT)
Dept: PRIMARY CARE | Facility: CLINIC | Age: 54
End: 2024-04-18
Payer: MEDICAID

## 2024-04-18 VITALS
RESPIRATION RATE: 18 BRPM | HEART RATE: 72 BPM | DIASTOLIC BLOOD PRESSURE: 76 MMHG | TEMPERATURE: 97.8 F | WEIGHT: 249 LBS | HEIGHT: 73 IN | OXYGEN SATURATION: 98 % | BODY MASS INDEX: 33 KG/M2 | SYSTOLIC BLOOD PRESSURE: 118 MMHG

## 2024-04-18 DIAGNOSIS — F10.11 HISTORY OF ALCOHOL ABUSE: ICD-10-CM

## 2024-04-18 DIAGNOSIS — I10 BENIGN HYPERTENSION: Primary | ICD-10-CM

## 2024-04-18 DIAGNOSIS — E78.2 MIXED HYPERLIPIDEMIA: ICD-10-CM

## 2024-04-18 PROBLEM — R13.12 OROPHARYNGEAL DYSPHAGIA: Status: ACTIVE | Noted: 2024-04-18

## 2024-04-18 PROBLEM — D64.9 ANEMIA: Status: ACTIVE | Noted: 2024-04-18

## 2024-04-18 PROBLEM — F10.10 ALCOHOL ABUSE, UNCOMPLICATED: Status: RESOLVED | Noted: 2024-01-23 | Resolved: 2024-04-18

## 2024-04-18 PROBLEM — E87.1 HYPO-OSMOLALITY AND HYPONATREMIA: Status: RESOLVED | Noted: 2024-01-23 | Resolved: 2024-04-18

## 2024-04-18 PROBLEM — K29.20 ALCOHOLIC GASTRITIS WITHOUT BLEEDING: Status: RESOLVED | Noted: 2024-01-23 | Resolved: 2024-04-18

## 2024-04-18 PROBLEM — R20.2 PARESTHESIA OF FOOT: Status: ACTIVE | Noted: 2024-04-18

## 2024-04-18 PROBLEM — K70.10 ALCOHOLIC HEPATITIS WITHOUT ASCITES (MULTI): Status: ACTIVE | Noted: 2024-01-23

## 2024-04-18 PROBLEM — Z20.822 CONTACT WITH AND (SUSPECTED) EXPOSURE TO COVID-19: Status: RESOLVED | Noted: 2024-01-23 | Resolved: 2024-04-18

## 2024-04-18 PROBLEM — Z86.79 HISTORY OF HYPERTENSION: Status: ACTIVE | Noted: 2024-04-18

## 2024-04-18 PROBLEM — Z11.52 ENCOUNTER FOR SCREENING FOR COVID-19: Status: ACTIVE | Noted: 2024-01-23

## 2024-04-18 PROBLEM — G93.41 METABOLIC ENCEPHALOPATHY: Status: ACTIVE | Noted: 2024-01-23

## 2024-04-18 PROBLEM — F10.99 ALCOHOL USE, UNSPECIFIED WITH UNSPECIFIED ALCOHOL-INDUCED DISORDER (CMS-HCC): Status: RESOLVED | Noted: 2024-01-23 | Resolved: 2024-04-18

## 2024-04-18 PROBLEM — R41.82 ALTERED MENTAL STATUS, UNSPECIFIED: Status: RESOLVED | Noted: 2024-01-23 | Resolved: 2024-04-18

## 2024-04-18 PROBLEM — R73.03 PREDIABETES: Status: ACTIVE | Noted: 2024-02-05

## 2024-04-18 PROBLEM — F10.230: Status: RESOLVED | Noted: 2024-01-23 | Resolved: 2024-04-18

## 2024-04-18 PROBLEM — N17.8 OTHER ACUTE KIDNEY FAILURE (CMS-HCC): Status: RESOLVED | Noted: 2024-01-23 | Resolved: 2024-04-18

## 2024-04-18 PROCEDURE — 99213 OFFICE O/P EST LOW 20 MIN: CPT | Performed by: STUDENT IN AN ORGANIZED HEALTH CARE EDUCATION/TRAINING PROGRAM

## 2024-04-18 PROCEDURE — 3074F SYST BP LT 130 MM HG: CPT | Performed by: STUDENT IN AN ORGANIZED HEALTH CARE EDUCATION/TRAINING PROGRAM

## 2024-04-18 PROCEDURE — 3078F DIAST BP <80 MM HG: CPT | Performed by: STUDENT IN AN ORGANIZED HEALTH CARE EDUCATION/TRAINING PROGRAM

## 2024-04-18 PROCEDURE — 3008F BODY MASS INDEX DOCD: CPT | Performed by: STUDENT IN AN ORGANIZED HEALTH CARE EDUCATION/TRAINING PROGRAM

## 2024-04-18 RX ORDER — FOLIC ACID 1 MG/1
1 TABLET ORAL DAILY
Qty: 30 TABLET | Refills: 5 | Status: SHIPPED | OUTPATIENT
Start: 2024-04-18 | End: 2024-10-15

## 2024-04-18 RX ORDER — ATORVASTATIN CALCIUM 10 MG/1
10 TABLET, FILM COATED ORAL DAILY
Qty: 90 TABLET | Refills: 1 | Status: SHIPPED | OUTPATIENT
Start: 2024-04-18

## 2024-04-18 RX ORDER — LISINOPRIL 40 MG/1
40 TABLET ORAL DAILY
Qty: 30 TABLET | Refills: 5 | Status: SHIPPED | OUTPATIENT
Start: 2024-04-18

## 2024-04-18 RX ORDER — METOPROLOL SUCCINATE 50 MG/1
TABLET, EXTENDED RELEASE ORAL
Qty: 30 TABLET | Refills: 5 | Status: SHIPPED | OUTPATIENT
Start: 2024-04-18

## 2024-04-18 RX ORDER — AMLODIPINE BESYLATE 5 MG/1
5 TABLET ORAL DAILY
Qty: 30 TABLET | Refills: 5 | Status: SHIPPED | OUTPATIENT
Start: 2024-04-18

## 2024-04-18 SDOH — ECONOMIC STABILITY: FOOD INSECURITY: WITHIN THE PAST 12 MONTHS, THE FOOD YOU BOUGHT JUST DIDN'T LAST AND YOU DIDN'T HAVE MONEY TO GET MORE.: NEVER TRUE

## 2024-04-18 SDOH — ECONOMIC STABILITY: FOOD INSECURITY: WITHIN THE PAST 12 MONTHS, YOU WORRIED THAT YOUR FOOD WOULD RUN OUT BEFORE YOU GOT MONEY TO BUY MORE.: NEVER TRUE

## 2024-04-18 ASSESSMENT — ENCOUNTER SYMPTOMS
UNEXPECTED WEIGHT CHANGE: 0
MUSCULOSKELETAL NEGATIVE: 1
COLOR CHANGE: 0
DIARRHEA: 0
CONSTIPATION: 0
PALPITATIONS: 0
DIZZINESS: 0
COUGH: 0
HEADACHES: 0
FEVER: 0
CHILLS: 0
WHEEZING: 0
FATIGUE: 0
SHORTNESS OF BREATH: 0
NAUSEA: 0
VOMITING: 0
CONFUSION: 0
ABDOMINAL PAIN: 0

## 2024-04-18 ASSESSMENT — LIFESTYLE VARIABLES
HOW OFTEN DO YOU HAVE A DRINK CONTAINING ALCOHOL: NEVER
HOW OFTEN DO YOU HAVE SIX OR MORE DRINKS ON ONE OCCASION: NEVER
AUDIT-C TOTAL SCORE: 0
SKIP TO QUESTIONS 9-10: 1
HOW MANY STANDARD DRINKS CONTAINING ALCOHOL DO YOU HAVE ON A TYPICAL DAY: PATIENT DOES NOT DRINK

## 2024-04-18 ASSESSMENT — PATIENT HEALTH QUESTIONNAIRE - PHQ9
2. FEELING DOWN, DEPRESSED OR HOPELESS: NOT AT ALL
1. LITTLE INTEREST OR PLEASURE IN DOING THINGS: NOT AT ALL
SUM OF ALL RESPONSES TO PHQ9 QUESTIONS 1 & 2: 0

## 2024-04-18 ASSESSMENT — PAIN SCALES - GENERAL: PAINLEVEL: 0-NO PAIN

## 2024-04-18 NOTE — PROGRESS NOTES
"Subjective   Patient ID: Selvin Ochoa is a 54 y.o. male who presents for Follow-up (Follow-up, no concerns. No alcohol since 01/2024).    HPI   He is here for FU visit. Reports he is doing okay, no more drinking alcohol, has maintained sobriety post hospitalization for alcohol use disorder/hyponatremia; states he feels much better now. Last alcohol intake was 01/09/24. Recent bld work (04/10/24) as CBC/CMP with normalization of both; folate & B12 also normal.     # HTN   - io /76   - takes lisinopril 40 mg, metop XL 50 mg daily & amlodipine 5 mg daily   - takes atorva 10 mg daily     Review of Systems   Constitutional:  Negative for chills, fatigue, fever and unexpected weight change.   HENT: Negative.     Respiratory:  Negative for cough, shortness of breath and wheezing.    Cardiovascular:  Negative for chest pain, palpitations and leg swelling.   Gastrointestinal:  Negative for abdominal pain, constipation, diarrhea, nausea and vomiting.   Musculoskeletal: Negative.    Skin:  Negative for color change and rash.   Neurological:  Negative for dizziness and headaches.   Psychiatric/Behavioral:  Negative for behavioral problems and confusion.        Objective   /76 (BP Location: Right arm, Patient Position: Sitting, BP Cuff Size: Large adult long)   Pulse 72   Temp 36.6 °C (97.8 °F)   Resp 18   Ht 1.854 m (6' 1\")   Wt 113 kg (249 lb)   SpO2 98%   BMI 32.85 kg/m²     Physical Exam  Vitals and nursing note reviewed.   Constitutional:       Appearance: Normal appearance. He is obese.      Comments: Anxious appearing male with sl flushed facial skin as usual    Cardiovascular:      Rate and Rhythm: Normal rate and regular rhythm.      Pulses: Normal pulses.      Heart sounds: Normal heart sounds.   Pulmonary:      Effort: Pulmonary effort is normal. No respiratory distress.      Breath sounds: Normal breath sounds.   Abdominal:      General: Abdomen is flat. Bowel sounds are normal.      Palpations: " Abdomen is soft.   Musculoskeletal:         General: Normal range of motion.   Neurological:      General: No focal deficit present.      Mental Status: He is alert and oriented to person, place, and time.   Psychiatric:         Mood and Affect: Mood normal.         Behavior: Behavior normal.       Assessment/Plan   He is here for FU visit. He is doing much better, no more drinking alcohol, has maintained sobriety post hospitalization (01/2024) for alcohol use disorder/hyponatremia.  Recent bld work (04/10/24) as CBC/CMP with normalization of both; folate & B12 also normal. Adv to cont MV w/ B12 and other vitamins as FA and thiamine as usual. He is otherwise clinically & vitally stable. Plan as follows     # HTN   - BP well controlled    - cont lisinopril 40 mg, metop XL 50 mg daily & amlodipine 5 mg daily   - cont atorva 10 mg daily   - encourage heart healthy & DASH diet; continue exercise as tolerated, at least 150 mins per wk     Problem List Items Addressed This Visit             ICD-10-CM    Benign hypertension - Primary I10    Relevant Medications    lisinopril 40 mg tablet    metoprolol succinate XL (Toprol-XL) 50 mg 24 hr tablet    amLODIPine (Norvasc) 5 mg tablet    Mixed hyperlipidemia E78.2    Relevant Medications    atorvastatin (Lipitor) 10 mg tablet     Other Visit Diagnoses         Codes    History of alcohol abuse     F10.11    Relevant Medications    folic acid (Folvite) 1 mg tablet          Rtc 4 mo for HM/FU    Pankaj Rios MD    [facility]

## 2024-08-22 ENCOUNTER — APPOINTMENT (OUTPATIENT)
Dept: PRIMARY CARE | Facility: CLINIC | Age: 54
End: 2024-08-22
Payer: MEDICAID

## 2024-08-22 VITALS
HEIGHT: 73 IN | RESPIRATION RATE: 16 BRPM | HEART RATE: 74 BPM | SYSTOLIC BLOOD PRESSURE: 123 MMHG | OXYGEN SATURATION: 97 % | BODY MASS INDEX: 35.12 KG/M2 | DIASTOLIC BLOOD PRESSURE: 76 MMHG | TEMPERATURE: 97.1 F | WEIGHT: 265 LBS

## 2024-08-22 DIAGNOSIS — Z12.11 SCREENING FOR COLON CANCER: ICD-10-CM

## 2024-08-22 DIAGNOSIS — F10.11 HISTORY OF ALCOHOL ABUSE: ICD-10-CM

## 2024-08-22 DIAGNOSIS — Z12.5 PROSTATE CANCER SCREENING: ICD-10-CM

## 2024-08-22 DIAGNOSIS — I10 BENIGN HYPERTENSION: ICD-10-CM

## 2024-08-22 DIAGNOSIS — Z00.00 ROUTINE GENERAL MEDICAL EXAMINATION AT A HEALTH CARE FACILITY: Primary | ICD-10-CM

## 2024-08-22 DIAGNOSIS — E78.2 MIXED HYPERLIPIDEMIA: ICD-10-CM

## 2024-08-22 PROCEDURE — 99396 PREV VISIT EST AGE 40-64: CPT | Performed by: STUDENT IN AN ORGANIZED HEALTH CARE EDUCATION/TRAINING PROGRAM

## 2024-08-22 PROCEDURE — 3008F BODY MASS INDEX DOCD: CPT | Performed by: STUDENT IN AN ORGANIZED HEALTH CARE EDUCATION/TRAINING PROGRAM

## 2024-08-22 PROCEDURE — 99213 OFFICE O/P EST LOW 20 MIN: CPT | Performed by: STUDENT IN AN ORGANIZED HEALTH CARE EDUCATION/TRAINING PROGRAM

## 2024-08-22 PROCEDURE — 3074F SYST BP LT 130 MM HG: CPT | Performed by: STUDENT IN AN ORGANIZED HEALTH CARE EDUCATION/TRAINING PROGRAM

## 2024-08-22 PROCEDURE — 3078F DIAST BP <80 MM HG: CPT | Performed by: STUDENT IN AN ORGANIZED HEALTH CARE EDUCATION/TRAINING PROGRAM

## 2024-08-22 RX ORDER — METOPROLOL SUCCINATE 50 MG/1
TABLET, EXTENDED RELEASE ORAL
Qty: 30 TABLET | Refills: 5 | Status: SHIPPED | OUTPATIENT
Start: 2024-08-22

## 2024-08-22 RX ORDER — FOLIC ACID 1 MG/1
1 TABLET ORAL DAILY
Qty: 30 TABLET | Refills: 5 | Status: SHIPPED | OUTPATIENT
Start: 2024-08-22 | End: 2025-02-18

## 2024-08-22 RX ORDER — AMLODIPINE BESYLATE 5 MG/1
5 TABLET ORAL DAILY
Qty: 30 TABLET | Refills: 5 | Status: SHIPPED | OUTPATIENT
Start: 2024-08-22

## 2024-08-22 RX ORDER — LISINOPRIL 40 MG/1
40 TABLET ORAL DAILY
Qty: 30 TABLET | Refills: 5 | Status: SHIPPED | OUTPATIENT
Start: 2024-08-22

## 2024-08-22 RX ORDER — ATORVASTATIN CALCIUM 10 MG/1
10 TABLET, FILM COATED ORAL DAILY
Qty: 90 TABLET | Refills: 1 | Status: SHIPPED | OUTPATIENT
Start: 2024-08-22

## 2024-08-22 SDOH — ECONOMIC STABILITY: FOOD INSECURITY: WITHIN THE PAST 12 MONTHS, YOU WORRIED THAT YOUR FOOD WOULD RUN OUT BEFORE YOU GOT MONEY TO BUY MORE.: NEVER TRUE

## 2024-08-22 SDOH — ECONOMIC STABILITY: FOOD INSECURITY: WITHIN THE PAST 12 MONTHS, THE FOOD YOU BOUGHT JUST DIDN'T LAST AND YOU DIDN'T HAVE MONEY TO GET MORE.: NEVER TRUE

## 2024-08-22 ASSESSMENT — ENCOUNTER SYMPTOMS
FATIGUE: 0
COLOR CHANGE: 0
MUSCULOSKELETAL NEGATIVE: 1
NAUSEA: 0
COUGH: 0
ABDOMINAL PAIN: 0
FEVER: 0
DIZZINESS: 0
VOMITING: 0
WHEEZING: 0
CONFUSION: 0
DIARRHEA: 0
UNEXPECTED WEIGHT CHANGE: 0
SHORTNESS OF BREATH: 0
HEADACHES: 0
PALPITATIONS: 0
CONSTIPATION: 0
CHILLS: 0

## 2024-08-22 ASSESSMENT — PAIN SCALES - GENERAL: PAINLEVEL: 0-NO PAIN

## 2024-08-22 NOTE — PROGRESS NOTES
"Subjective   Patient ID: Selvin Ochoa is a 54 y.o. male who presents for Annual Exam (4 month follow up) and FU. Reports he is doing doing, no more drinking alcohol. Reports chronic problems are stable as listed below and taking all medications as prescribed w/o issues, request refills.      # HTN   - io /76  - takes lisinopril 40 mg, metop XL 50 mg daily & amlodipine 5 mg daily   - takes atorva 10 mg daily    # HM   Self health assessment: good   Concern: as above   Occupation: construction   Living With: self     Sleep: okay   Exercise: not much   Diet: mixed   Tobacco: No  Alcohol: Alcohol Use: No, patient does not drink alcohol.  Sexually active: No     Dentist: not recently; have dentures   Eye doctor: last yr   Hearing issues: No    Family history of colon cancer: no  Last colonoscopy & result: never had; declined scope, okay for cologuard   Family history of prostate cancer: none   History of abnormal lipids: yes - taking statin     Review of Systems   Constitutional:  Negative for chills, fatigue, fever and unexpected weight change.   HENT: Negative.     Respiratory:  Negative for cough, shortness of breath and wheezing.    Cardiovascular:  Negative for chest pain, palpitations and leg swelling.   Gastrointestinal:  Negative for abdominal pain, constipation, diarrhea, nausea and vomiting.   Musculoskeletal: Negative.    Skin:  Negative for color change and rash.   Neurological:  Negative for dizziness and headaches.   Psychiatric/Behavioral:  Negative for behavioral problems and confusion.         Objective    /76 (BP Location: Right arm, Patient Position: Sitting, BP Cuff Size: Adult)   Pulse 74   Temp 36.2 °C (97.1 °F) (Temporal)   Resp 16   Ht 1.854 m (6' 1\")   Wt 120 kg (265 lb)   SpO2 97%   BMI 34.96 kg/m²  Body mass index is 34.96 kg/m².    Physical Exam  Vitals and nursing note reviewed.   Constitutional:       Appearance: Normal appearance. He is obese.   HENT:      Right Ear: " Tympanic membrane normal.      Left Ear: Tympanic membrane normal.   Eyes:      Extraocular Movements: Extraocular movements intact.      Pupils: Pupils are equal, round, and reactive to light.   Cardiovascular:      Rate and Rhythm: Normal rate and regular rhythm.      Pulses: Normal pulses.      Heart sounds: Normal heart sounds.   Pulmonary:      Effort: Pulmonary effort is normal.      Breath sounds: Normal breath sounds.   Abdominal:      General: Abdomen is flat. Bowel sounds are normal.      Palpations: Abdomen is soft.   Musculoskeletal:         General: Normal range of motion.   Neurological:      General: No focal deficit present.      Mental Status: He is alert.      Cranial Nerves: No cranial nerve deficit.      Sensory: No sensory deficit.      Motor: No weakness.   Psychiatric:         Mood and Affect: Mood normal.         Behavior: Behavior normal.        Assessment and Plan   He is here for annual physical and FU visit.  Overall doing okay, no major concerns today and is clinically & vitally stable. Other chronic problems are stable; BP well controlled; continue all medications as usual. Rx refilled. Plan as follows      #HM   Screening tests:  - Colonoscopy (age 45-75): ordered cologuard   - PSA (age >50): ordered   - Lipid profile: ordered   - Low dose CT chest (age 50-80); former smoker; quit 2-3 yrs ago; smoked for 30 yrs; declined scrn test     Primary prevention:  - Flu shot: n/a   - COVID vaccines: declined   - Tdap shot: UTD   - Shingles shot (age >50): rec to get at pharmacy   - Statin (age 40-65 or high risk): taking     Counseling:   - Diet, Weight gain: Advise heart healthy diet (low carbs, low fat; add more fruits/veges and whole grain food) and regular exercise 30mins daily x 5 days per week.  Also rec 10mins of aerobic exercise/jogging daily x 5 days/wk  - Rec Ca (600-1200mg) and Vit D (800-1000U) daily     Others:  - Depression screening: Neg, happy appearing male  - Bld work per EMR,  will call for any abn result, pt was made aware     Assessment/Plan   Problem List Items Addressed This Visit             ICD-10-CM    Benign hypertension I10    Relevant Medications    amLODIPine (Norvasc) 5 mg tablet    lisinopril 40 mg tablet    metoprolol succinate XL (Toprol-XL) 50 mg 24 hr tablet    Other Relevant Orders    Comprehensive Metabolic Panel    CBC and Auto Differential    Mixed hyperlipidemia E78.2    Relevant Medications    atorvastatin (Lipitor) 10 mg tablet    Other Relevant Orders    Lipid Panel     Other Visit Diagnoses         Codes    Routine general medical examination at a health care facility    -  Primary Z00.00    History of alcohol abuse     F10.11    Relevant Medications    folic acid (Folvite) 1 mg tablet    Screening for colon cancer     Z12.11    Relevant Orders    Cologuard® colon cancer screening    Prostate cancer screening     Z12.5    Relevant Orders    Prostate Spec.Ag,Screen          Rtc 6 mo for FU   Pankaj Rios MD    [facility]

## 2024-09-10 LAB — NONINV COLON CA DNA+OCC BLD SCRN STL QL: NEGATIVE

## 2025-01-29 LAB
ALBUMIN SERPL-MCNC: 4 G/DL (ref 3.6–5.1)
ALP SERPL-CCNC: 65 U/L (ref 35–144)
ALT SERPL-CCNC: 25 U/L (ref 9–46)
ANION GAP SERPL CALCULATED.4IONS-SCNC: 7 MMOL/L (CALC) (ref 7–17)
AST SERPL-CCNC: 22 U/L (ref 10–35)
BASOPHILS # BLD AUTO: 110 CELLS/UL (ref 0–200)
BASOPHILS NFR BLD AUTO: 1.5 %
BILIRUB SERPL-MCNC: 0.6 MG/DL (ref 0.2–1.2)
BUN SERPL-MCNC: 17 MG/DL (ref 7–25)
CALCIUM SERPL-MCNC: 9 MG/DL (ref 8.6–10.3)
CHLORIDE SERPL-SCNC: 101 MMOL/L (ref 98–110)
CHOLEST SERPL-MCNC: 167 MG/DL
CHOLEST/HDLC SERPL: 2.9 (CALC)
CO2 SERPL-SCNC: 27 MMOL/L (ref 20–32)
CREAT SERPL-MCNC: 0.98 MG/DL (ref 0.7–1.3)
EGFRCR SERPLBLD CKD-EPI 2021: 92 ML/MIN/1.73M2
EOSINOPHIL # BLD AUTO: 409 CELLS/UL (ref 15–500)
EOSINOPHIL NFR BLD AUTO: 5.6 %
ERYTHROCYTE [DISTWIDTH] IN BLOOD BY AUTOMATED COUNT: 12.2 % (ref 11–15)
GLUCOSE SERPL-MCNC: 108 MG/DL (ref 65–99)
HCT VFR BLD AUTO: 45 % (ref 38.5–50)
HDLC SERPL-MCNC: 58 MG/DL
HGB BLD-MCNC: 14.4 G/DL (ref 13.2–17.1)
LDLC SERPL CALC-MCNC: 94 MG/DL (CALC)
LYMPHOCYTES # BLD AUTO: 2197 CELLS/UL (ref 850–3900)
LYMPHOCYTES NFR BLD AUTO: 30.1 %
MCH RBC QN AUTO: 29.5 PG (ref 27–33)
MCHC RBC AUTO-ENTMCNC: 32 G/DL (ref 32–36)
MCV RBC AUTO: 92.2 FL (ref 80–100)
MONOCYTES # BLD AUTO: 562 CELLS/UL (ref 200–950)
MONOCYTES NFR BLD AUTO: 7.7 %
NEUTROPHILS # BLD AUTO: 4022 CELLS/UL (ref 1500–7800)
NEUTROPHILS NFR BLD AUTO: 55.1 %
NONHDLC SERPL-MCNC: 109 MG/DL (CALC)
PLATELET # BLD AUTO: 348 THOUSAND/UL (ref 140–400)
PMV BLD REES-ECKER: 10.4 FL (ref 7.5–12.5)
POTASSIUM SERPL-SCNC: 4.8 MMOL/L (ref 3.5–5.3)
PROT SERPL-MCNC: 6.4 G/DL (ref 6.1–8.1)
PSA SERPL-MCNC: 0.4 NG/ML
RBC # BLD AUTO: 4.88 MILLION/UL (ref 4.2–5.8)
SODIUM SERPL-SCNC: 135 MMOL/L (ref 135–146)
TRIGL SERPL-MCNC: 67 MG/DL
WBC # BLD AUTO: 7.3 THOUSAND/UL (ref 3.8–10.8)

## 2025-02-05 ENCOUNTER — APPOINTMENT (OUTPATIENT)
Dept: PRIMARY CARE | Facility: CLINIC | Age: 55
End: 2025-02-05
Payer: MEDICAID

## 2025-02-05 VITALS
HEIGHT: 73 IN | BODY MASS INDEX: 34.72 KG/M2 | TEMPERATURE: 95.7 F | WEIGHT: 262 LBS | RESPIRATION RATE: 16 BRPM | HEART RATE: 73 BPM | OXYGEN SATURATION: 97 % | DIASTOLIC BLOOD PRESSURE: 79 MMHG | SYSTOLIC BLOOD PRESSURE: 129 MMHG

## 2025-02-05 DIAGNOSIS — I10 BENIGN HYPERTENSION: Primary | ICD-10-CM

## 2025-02-05 DIAGNOSIS — F10.11 HISTORY OF ALCOHOL ABUSE: ICD-10-CM

## 2025-02-05 DIAGNOSIS — E78.2 MIXED HYPERLIPIDEMIA: ICD-10-CM

## 2025-02-05 PROBLEM — K70.10 ALCOHOLIC HEPATITIS WITHOUT ASCITES (MULTI): Status: RESOLVED | Noted: 2024-01-23 | Resolved: 2025-02-05

## 2025-02-05 PROCEDURE — 99213 OFFICE O/P EST LOW 20 MIN: CPT | Performed by: STUDENT IN AN ORGANIZED HEALTH CARE EDUCATION/TRAINING PROGRAM

## 2025-02-05 PROCEDURE — 3074F SYST BP LT 130 MM HG: CPT | Performed by: STUDENT IN AN ORGANIZED HEALTH CARE EDUCATION/TRAINING PROGRAM

## 2025-02-05 PROCEDURE — 3008F BODY MASS INDEX DOCD: CPT | Performed by: STUDENT IN AN ORGANIZED HEALTH CARE EDUCATION/TRAINING PROGRAM

## 2025-02-05 PROCEDURE — 3078F DIAST BP <80 MM HG: CPT | Performed by: STUDENT IN AN ORGANIZED HEALTH CARE EDUCATION/TRAINING PROGRAM

## 2025-02-05 RX ORDER — METOPROLOL SUCCINATE 50 MG/1
TABLET, EXTENDED RELEASE ORAL
Qty: 30 TABLET | Refills: 5 | Status: SHIPPED | OUTPATIENT
Start: 2025-02-05

## 2025-02-05 RX ORDER — FOLIC ACID 1 MG/1
1 TABLET ORAL DAILY
Qty: 30 TABLET | Refills: 5 | Status: SHIPPED | OUTPATIENT
Start: 2025-02-05 | End: 2025-08-04

## 2025-02-05 RX ORDER — ATORVASTATIN CALCIUM 10 MG/1
10 TABLET, FILM COATED ORAL DAILY
Qty: 90 TABLET | Refills: 1 | Status: SHIPPED | OUTPATIENT
Start: 2025-02-05

## 2025-02-05 RX ORDER — LISINOPRIL 40 MG/1
40 TABLET ORAL DAILY
Qty: 30 TABLET | Refills: 5 | Status: SHIPPED | OUTPATIENT
Start: 2025-02-05

## 2025-02-05 RX ORDER — AMLODIPINE BESYLATE 5 MG/1
5 TABLET ORAL DAILY
Qty: 30 TABLET | Refills: 5 | Status: SHIPPED | OUTPATIENT
Start: 2025-02-05

## 2025-02-05 SDOH — ECONOMIC STABILITY: FOOD INSECURITY: WITHIN THE PAST 12 MONTHS, YOU WORRIED THAT YOUR FOOD WOULD RUN OUT BEFORE YOU GOT MONEY TO BUY MORE.: NEVER TRUE

## 2025-02-05 SDOH — ECONOMIC STABILITY: FOOD INSECURITY: WITHIN THE PAST 12 MONTHS, THE FOOD YOU BOUGHT JUST DIDN'T LAST AND YOU DIDN'T HAVE MONEY TO GET MORE.: NEVER TRUE

## 2025-02-05 ASSESSMENT — LIFESTYLE VARIABLES
AUDIT-C TOTAL SCORE: 0
HOW MANY STANDARD DRINKS CONTAINING ALCOHOL DO YOU HAVE ON A TYPICAL DAY: PATIENT DOES NOT DRINK
SKIP TO QUESTIONS 9-10: 1
HOW OFTEN DO YOU HAVE A DRINK CONTAINING ALCOHOL: NEVER
HOW OFTEN DO YOU HAVE SIX OR MORE DRINKS ON ONE OCCASION: NEVER

## 2025-02-05 ASSESSMENT — ENCOUNTER SYMPTOMS
MUSCULOSKELETAL NEGATIVE: 1
ABDOMINAL PAIN: 0
DIZZINESS: 0
FEVER: 0
VOMITING: 0
UNEXPECTED WEIGHT CHANGE: 0
CONFUSION: 0
CONSTIPATION: 0
COLOR CHANGE: 0
PALPITATIONS: 0
WHEEZING: 0
FATIGUE: 0
SHORTNESS OF BREATH: 0
CHILLS: 0
HEADACHES: 0
COUGH: 0
NAUSEA: 0
DIARRHEA: 0

## 2025-08-07 ENCOUNTER — APPOINTMENT (OUTPATIENT)
Dept: PRIMARY CARE | Facility: CLINIC | Age: 55
End: 2025-08-07
Payer: MEDICAID

## 2025-08-07 VITALS
SYSTOLIC BLOOD PRESSURE: 119 MMHG | DIASTOLIC BLOOD PRESSURE: 77 MMHG | WEIGHT: 258 LBS | TEMPERATURE: 97 F | BODY MASS INDEX: 34.19 KG/M2 | HEIGHT: 73 IN | HEART RATE: 85 BPM | OXYGEN SATURATION: 96 % | RESPIRATION RATE: 16 BRPM

## 2025-08-07 DIAGNOSIS — I10 BENIGN HYPERTENSION: ICD-10-CM

## 2025-08-07 DIAGNOSIS — Z00.00 ROUTINE GENERAL MEDICAL EXAMINATION AT A HEALTH CARE FACILITY: Primary | ICD-10-CM

## 2025-08-07 DIAGNOSIS — E78.2 MIXED HYPERLIPIDEMIA: ICD-10-CM

## 2025-08-07 DIAGNOSIS — Z12.5 PROSTATE CANCER SCREENING: ICD-10-CM

## 2025-08-07 DIAGNOSIS — F10.11 HISTORY OF ALCOHOL ABUSE: ICD-10-CM

## 2025-08-07 PROCEDURE — 3078F DIAST BP <80 MM HG: CPT | Performed by: STUDENT IN AN ORGANIZED HEALTH CARE EDUCATION/TRAINING PROGRAM

## 2025-08-07 PROCEDURE — 99213 OFFICE O/P EST LOW 20 MIN: CPT | Performed by: STUDENT IN AN ORGANIZED HEALTH CARE EDUCATION/TRAINING PROGRAM

## 2025-08-07 PROCEDURE — 3008F BODY MASS INDEX DOCD: CPT | Performed by: STUDENT IN AN ORGANIZED HEALTH CARE EDUCATION/TRAINING PROGRAM

## 2025-08-07 PROCEDURE — 3074F SYST BP LT 130 MM HG: CPT | Performed by: STUDENT IN AN ORGANIZED HEALTH CARE EDUCATION/TRAINING PROGRAM

## 2025-08-07 PROCEDURE — 99396 PREV VISIT EST AGE 40-64: CPT | Performed by: STUDENT IN AN ORGANIZED HEALTH CARE EDUCATION/TRAINING PROGRAM

## 2025-08-07 RX ORDER — FOLIC ACID 1 MG/1
1 TABLET ORAL DAILY
Qty: 30 TABLET | Refills: 5 | Status: SHIPPED | OUTPATIENT
Start: 2025-08-07 | End: 2026-02-03

## 2025-08-07 RX ORDER — ATORVASTATIN CALCIUM 10 MG/1
10 TABLET, FILM COATED ORAL DAILY
Qty: 90 TABLET | Refills: 1 | Status: SHIPPED | OUTPATIENT
Start: 2025-08-07

## 2025-08-07 RX ORDER — AMLODIPINE BESYLATE 5 MG/1
5 TABLET ORAL DAILY
Qty: 30 TABLET | Refills: 5 | Status: SHIPPED | OUTPATIENT
Start: 2025-08-07

## 2025-08-07 RX ORDER — BISMUTH SUBSALICYLATE 262 MG
1 TABLET,CHEWABLE ORAL DAILY
COMMUNITY

## 2025-08-07 RX ORDER — LISINOPRIL 40 MG/1
40 TABLET ORAL DAILY
Qty: 30 TABLET | Refills: 5 | Status: SHIPPED | OUTPATIENT
Start: 2025-08-07

## 2025-08-07 RX ORDER — METOPROLOL SUCCINATE 50 MG/1
TABLET, EXTENDED RELEASE ORAL
Qty: 30 TABLET | Refills: 5 | Status: SHIPPED | OUTPATIENT
Start: 2025-08-07

## 2025-08-07 RX ORDER — THIAMINE HCL 50 MG
50 TABLET ORAL DAILY
COMMUNITY

## 2025-08-07 SDOH — ECONOMIC STABILITY: FOOD INSECURITY: WITHIN THE PAST 12 MONTHS, YOU WORRIED THAT YOUR FOOD WOULD RUN OUT BEFORE YOU GOT MONEY TO BUY MORE.: NEVER TRUE

## 2025-08-07 SDOH — ECONOMIC STABILITY: FOOD INSECURITY: WITHIN THE PAST 12 MONTHS, THE FOOD YOU BOUGHT JUST DIDN'T LAST AND YOU DIDN'T HAVE MONEY TO GET MORE.: NEVER TRUE

## 2025-08-07 ASSESSMENT — ENCOUNTER SYMPTOMS
FEVER: 0
SHORTNESS OF BREATH: 0
COLOR CHANGE: 0
MUSCULOSKELETAL NEGATIVE: 1
CONSTIPATION: 0
HEADACHES: 0
COUGH: 0
ABDOMINAL PAIN: 0
DIZZINESS: 0
CONFUSION: 0
DIARRHEA: 0
VOMITING: 0
UNEXPECTED WEIGHT CHANGE: 0
NAUSEA: 0
PALPITATIONS: 0
WHEEZING: 0
CHILLS: 0
FATIGUE: 0

## 2025-08-07 ASSESSMENT — LIFESTYLE VARIABLES
AUDIT-C TOTAL SCORE: 0
HOW MANY STANDARD DRINKS CONTAINING ALCOHOL DO YOU HAVE ON A TYPICAL DAY: PATIENT DOES NOT DRINK
SKIP TO QUESTIONS 9-10: 1
HOW OFTEN DO YOU HAVE SIX OR MORE DRINKS ON ONE OCCASION: NEVER
HOW OFTEN DO YOU HAVE A DRINK CONTAINING ALCOHOL: NEVER

## 2025-08-07 ASSESSMENT — PATIENT HEALTH QUESTIONNAIRE - PHQ9
SUM OF ALL RESPONSES TO PHQ9 QUESTIONS 1 & 2: 0
1. LITTLE INTEREST OR PLEASURE IN DOING THINGS: NOT AT ALL
2. FEELING DOWN, DEPRESSED OR HOPELESS: NOT AT ALL

## 2025-08-07 NOTE — PROGRESS NOTES
"Subjective   Patient ID: Selvin Ochoa is a 55 y.o. male who presents for Annual Exam (No new concerns) and FU. Reports he feels much better after he quit drinking alcohol, quit about 1.5 yrs ago (01/2024). He denies acute illness and has been taking all meds as prescribed.  Last blood work, 1/28/2025 remains stable, reviewed with the patient.     # HTN/HLD   - io /77  - takes lisinopril 40 mg, metop XL 50 mg daily & amlodipine 5 mg daily   - takes atorva 10 mg daily    # HM   Self health assessment: good   Concern: as above   Occupation: construction   Living With: self      Sleep: okay   Exercise: active at work  Diet: mixed   Tobacco: No  Alcohol: Alcohol Use: No, patient does not drink alcohol. Quit back in 01/2024.   Sexually active: No     Dentist: not recently; have dentures   Eye doctor: planning to see soon   Hearing issues: No     Family history of colon cancer: no  Last colonoscopy & result: had cologuard (09/24) wnl.   Family history of prostate cancer: none; had nl PSA level   History of abnormal lipids: yes - taking statin     Review of Systems   Constitutional:  Negative for chills, fatigue, fever and unexpected weight change.   HENT: Negative.     Respiratory:  Negative for cough, shortness of breath and wheezing.    Cardiovascular:  Negative for chest pain, palpitations and leg swelling.   Gastrointestinal:  Negative for abdominal pain, constipation, diarrhea, nausea and vomiting.   Musculoskeletal: Negative.    Skin:  Negative for color change and rash.   Neurological:  Negative for dizziness and headaches.   Psychiatric/Behavioral:  Negative for behavioral problems and confusion.         Objective    /77 (BP Location: Right arm, Patient Position: Sitting, BP Cuff Size: Large adult)   Pulse 85   Temp 36.1 °C (97 °F) (Temporal)   Resp 16   Ht 1.854 m (6' 1\")   Wt 117 kg (258 lb)   SpO2 96%   BMI 34.04 kg/m²  Body mass index is 34.04 kg/m².    Physical Exam  Vitals and nursing note " reviewed.   Constitutional:       Appearance: Normal appearance. He is obese.      Comments: Slightly anxious appearing male with sl flushed facial skin as usual    HENT:      Right Ear: Tympanic membrane normal.      Left Ear: Tympanic membrane normal.     Eyes:      Extraocular Movements: Extraocular movements intact.      Pupils: Pupils are equal, round, and reactive to light.       Cardiovascular:      Rate and Rhythm: Normal rate and regular rhythm.      Pulses: Normal pulses.      Heart sounds: Normal heart sounds.   Pulmonary:      Effort: Pulmonary effort is normal. No respiratory distress.      Breath sounds: Normal breath sounds.   Abdominal:      General: Abdomen is flat. Bowel sounds are normal.      Palpations: Abdomen is soft.     Musculoskeletal:         General: Normal range of motion.     Neurological:      General: No focal deficit present.      Mental Status: He is alert. Mental status is at baseline.      Cranial Nerves: No cranial nerve deficit.      Sensory: No sensory deficit.      Motor: No weakness.     Psychiatric:         Mood and Affect: Mood normal.         Behavior: Behavior normal.          Assessment and Plan   He is here for annual physical and follow-up visit. Appears he is doing very well, no major concerns today and is clinically & vitally stable.   BP remains well-controlled, continue current meds as listed below. Continue atorvastatin 10 mg daily as usual. Continue to avoid drinking alcohol and continue following DASH diet. Recommend to continue multivitamin and folic acid as usual. Plan as follows      #HM   Screening tests:  - Colonoscopy (age 45-75): UTD   - PSA (age >50): ordered   - Lipid profile: ordered   - Low dose CT chest (age 50-80); former smoker; quit few  yrs ago; smoked for 30 yrs; declined scrn test for now.      Primary prevention:  - Flu shot: n/a   - COVID vaccines: declined   - Tdap shot: UTD   - Shingles shot (age >50): rec to get at pharmacy   - Statin (age  40-65 or high risk): taking      Counseling:   - Diet, Weight gain: Advise heart healthy diet (low carbs, low fat; add more fruits/veges and whole grain food) and regular exercise 30mins daily x 5 days per week.  Also rec 10mins of aerobic exercise/jogging daily x 5 days/wk  - Rec Ca (600-1200mg) and Vit D (800-1000U) daily      Others:  - Depression screening: Neg, happy appearing male  - Bld work per EMR, will call for any abn result, pt was made aware     Assessment/Plan   Problem List Items Addressed This Visit           ICD-10-CM    Benign hypertension I10    Relevant Medications    amLODIPine (Norvasc) 5 mg tablet    lisinopril 40 mg tablet    metoprolol succinate XL (Toprol-XL) 50 mg 24 hr tablet    Other Relevant Orders    Comprehensive Metabolic Panel    CBC    Mixed hyperlipidemia E78.2    Relevant Medications    atorvastatin (Lipitor) 10 mg tablet    folic acid (Folvite) 1 mg tablet    thiamine (Vitamin B-1) 50 mg tablet    Other Relevant Orders    Lipid Panel     Other Visit Diagnoses         Codes      Routine general medical examination at a health care facility    -  Primary Z00.00      History of alcohol abuse     F10.11    Relevant Medications    folic acid (Folvite) 1 mg tablet      Prostate cancer screening     Z12.5    Relevant Orders    Prostate Spec.Ag,Screen          Rtc 6 mo for FU     This note was partially generated using the Dragon Voice recognition system. There may be some incorrect wording, spelling and/or spelling errors or punctuation errors that were not corrected prior to committing the note to the medical record.      Pankaj Rios MD   [facility], Family Medicine

## 2026-01-28 ENCOUNTER — APPOINTMENT (OUTPATIENT)
Dept: PRIMARY CARE | Facility: CLINIC | Age: 56
End: 2026-01-28
Payer: MEDICAID